# Patient Record
Sex: FEMALE | Race: BLACK OR AFRICAN AMERICAN | Employment: OTHER | URBAN - METROPOLITAN AREA
[De-identification: names, ages, dates, MRNs, and addresses within clinical notes are randomized per-mention and may not be internally consistent; named-entity substitution may affect disease eponyms.]

---

## 2017-01-22 RX ORDER — SIMVASTATIN 40 MG/1
TABLET, FILM COATED ORAL
Qty: 90 TAB | Refills: 0 | Status: SHIPPED | OUTPATIENT
Start: 2017-01-22 | End: 2017-05-24 | Stop reason: SDUPTHER

## 2017-02-03 ENCOUNTER — LAB ONLY (OUTPATIENT)
Dept: INTERNAL MEDICINE CLINIC | Age: 67
End: 2017-02-03

## 2017-02-03 DIAGNOSIS — R73.09 ELEVATED HEMOGLOBIN A1C: ICD-10-CM

## 2017-02-03 DIAGNOSIS — R73.01 IFG (IMPAIRED FASTING GLUCOSE): ICD-10-CM

## 2017-02-03 DIAGNOSIS — I10 ESSENTIAL HYPERTENSION WITH GOAL BLOOD PRESSURE LESS THAN 130/85: ICD-10-CM

## 2017-02-03 DIAGNOSIS — Z00.00 PHYSICAL EXAM, ANNUAL: ICD-10-CM

## 2017-02-04 LAB
BUN SERPL-MCNC: 13 MG/DL (ref 8–27)
BUN/CREAT SERPL: 15 (ref 11–26)
CALCIUM SERPL-MCNC: 10.2 MG/DL (ref 8.7–10.3)
CHLORIDE SERPL-SCNC: 101 MMOL/L (ref 96–106)
CO2 SERPL-SCNC: 22 MMOL/L (ref 18–29)
CREAT SERPL-MCNC: 0.89 MG/DL (ref 0.57–1)
ERYTHROCYTE [DISTWIDTH] IN BLOOD BY AUTOMATED COUNT: 13.7 % (ref 12.3–15.4)
GLUCOSE SERPL-MCNC: 89 MG/DL (ref 65–99)
HBA1C MFR BLD: 6.1 % (ref 4.8–5.6)
HCT VFR BLD AUTO: 34.7 % (ref 34–46.6)
HGB BLD-MCNC: 11.8 G/DL (ref 11.1–15.9)
MCH RBC QN AUTO: 30.5 PG (ref 26.6–33)
MCHC RBC AUTO-ENTMCNC: 34 G/DL (ref 31.5–35.7)
MCV RBC AUTO: 90 FL (ref 79–97)
PLATELET # BLD AUTO: 330 X10E3/UL (ref 150–379)
POTASSIUM SERPL-SCNC: 4.4 MMOL/L (ref 3.5–5.2)
RBC # BLD AUTO: 3.87 X10E6/UL (ref 3.77–5.28)
SODIUM SERPL-SCNC: 146 MMOL/L (ref 134–144)
TSH SERPL DL<=0.005 MIU/L-ACNC: 2.06 UIU/ML (ref 0.45–4.5)
WBC # BLD AUTO: 4.7 X10E3/UL (ref 3.4–10.8)

## 2017-02-06 ENCOUNTER — OFFICE VISIT (OUTPATIENT)
Dept: INTERNAL MEDICINE CLINIC | Age: 67
End: 2017-02-06

## 2017-02-06 VITALS
TEMPERATURE: 98.8 F | DIASTOLIC BLOOD PRESSURE: 69 MMHG | WEIGHT: 180.4 LBS | HEIGHT: 67 IN | HEART RATE: 66 BPM | OXYGEN SATURATION: 99 % | SYSTOLIC BLOOD PRESSURE: 121 MMHG | BODY MASS INDEX: 28.31 KG/M2

## 2017-02-06 DIAGNOSIS — M54.6 PAIN IN THORACIC SPINE: Primary | ICD-10-CM

## 2017-02-06 DIAGNOSIS — H40.9 GLAUCOMA OF BOTH EYES, UNSPECIFIED GLAUCOMA: ICD-10-CM

## 2017-02-06 DIAGNOSIS — R73.03 PREDIABETES: ICD-10-CM

## 2017-02-06 DIAGNOSIS — E78.00 PURE HYPERCHOLESTEROLEMIA: ICD-10-CM

## 2017-02-06 DIAGNOSIS — I10 ESSENTIAL HYPERTENSION WITH GOAL BLOOD PRESSURE LESS THAN 130/85: Primary | ICD-10-CM

## 2017-02-06 DIAGNOSIS — M85.80 OSTEOPENIA: ICD-10-CM

## 2017-02-06 DIAGNOSIS — K59.04 CHRONIC IDIOPATHIC CONSTIPATION: ICD-10-CM

## 2017-02-06 DIAGNOSIS — G89.29 CHRONIC LEFT-SIDED THORACIC BACK PAIN: ICD-10-CM

## 2017-02-06 DIAGNOSIS — Z23 ENCOUNTER FOR IMMUNIZATION: ICD-10-CM

## 2017-02-06 DIAGNOSIS — M54.6 CHRONIC LEFT-SIDED THORACIC BACK PAIN: ICD-10-CM

## 2017-02-06 RX ORDER — MELATONIN
DAILY
COMMUNITY
End: 2019-01-09

## 2017-02-06 NOTE — MR AVS SNAPSHOT
Visit Information Date & Time Provider Department Dept. Phone Encounter #  
 2/6/2017  1:30 PM Rhoderick Romberg, [address] [place] [address],[address] [phone] [id number] Follow-up Instructions Return in about 6 months (around 8/6/2017). Upcoming Health Maintenance Date Due ZOSTER VACCINE AGE 60> 11/25/2012 PAP AKA CERVICAL CYTOLOGY 4/21/2017 BREAST CANCER SCRN MAMMOGRAM 4/19/2018 COLONOSCOPY 12/1/2018 DTaP/Tdap/Td series (2 - Td) 1/1/2022 Allergies as of 2/6/2017  Review Complete On: 2/6/2017 By: Rhoderick Romberg, MD  
 No Known Allergies Current Immunizations  Reviewed on 2/6/2017 Name Date Tdap 1/1/2012 Reviewed by Rhoderick Romberg, MD on 2/6/2017 at  1:32 PM  
You Were Diagnosed With   
  
 Codes Comments Essential hypertension with goal blood pressure less than 130/85    -  Primary ICD-10-CM: I10 
ICD-9-CM: 401.9 Glaucoma of both eyes, unspecified glaucoma     ICD-10-CM: H40.9 ICD-9-CM: 365.9 Pure hypercholesterolemia     ICD-10-CM: E78.00 ICD-9-CM: 272.0 Prediabetes     ICD-10-CM: R73.03 
ICD-9-CM: 790.29 Chronic idiopathic constipation     ICD-10-CM: K59.04 
ICD-9-CM: 564.00 Osteopenia     ICD-10-CM: M85.80 ICD-9-CM: 733.90 Chronic left-sided thoracic back pain     ICD-10-CM: M54.6, G89.29 ICD-9-CM: 724.1, 338.29 Vitals BP Pulse Temp Height(growth percentile) Weight(growth percentile) SpO2  
 121/69 (BP 1 Location: Right arm, BP Patient Position: Sitting) 66 98.8 °F (37.1 °C) (Oral) 5' 7\" (1.702 m) 180 lb 6.4 oz (81.8 kg) 99% BMI OB Status Smoking Status 28.25 kg/m2 Hysterectomy Never Smoker BMI and BSA Data Body Mass Index Body Surface Area  
 28.25 kg/m 2 1.97 m 2 Preferred Pharmacy Pharmacy Name Phone [address], [facility] [phone] Your Updated Medication List  
  
   
 This list is accurate as of: 2/6/17  1:52 PM.  Always use your most recent med list.  
  
  
  
  
 CALTRATE 600 + D PO Take 1 Tab by mouth two (2) times a day. cholecalciferol 1,000 unit tablet Commonly known as:  VITAMIN D3 Take  by mouth daily. losartan-hydroCHLOROthiazide 100-25 mg per tablet Commonly known as:  HYZAAR Take 1 Tab by mouth daily. polyethylene glycol 17 gram packet Commonly known as:  Reda George Take 17 g by mouth daily. simvastatin 40 mg tablet Commonly known as:  ZOCOR  
TAKE 1 TABLET DAILY We Performed the Following REFERRAL FOR COLONOSCOPY [QTF889 Custom] Comments:  
 Please evaluate patient for change in BM  
 REFERRAL TO OPHTHALMOLOGY [REF57 Custom] Comments:  
 Please evaluate patient for h/o gluacoma Follow-up Instructions Return in about 6 months (around 8/6/2017). To-Do List   
 02/06/2017 Imaging:  XR SPINE THORAC 3 V   
  
 02/07/2017 Lab:  HEMOGLOBIN A1C WITH EAG   
  
 02/07/2017 Lab:  LIPID PANEL   
  
 02/07/2017 Lab:  METABOLIC PANEL, COMPREHENSIVE   
  
 02/07/2017 Lab:  TSH 3RD GENERATION   
  
 02/07/2017 Lab:  VITAMIN D, 25 HYDROXY Referral Information Referral ID Referred By Referred To  
  
 [id number] [facility] [address] [place] [address], [address] [place] [address] Phone: [phone] Fax: [phone] Visits Status Start Date End Date 1 New Request 2/6/17 2/6/18 If your referral has a status of pending review or denied, additional information will be sent to support the outcome of this decision. Referral ID Referred By Referred To  
 [id number] [facility] 202 [facility] 200 [facility] Visits Status Start Date End Date 1 New Request 2/6/17 2/6/18  If your referral has a status of pending review or denied, additional information will be sent to support the outcome of this decision. Introducing [facility]! Lew Castillo introduces [employer] patient portal. Now you can access parts of your medical record, email your doctor's office, and request medication refills online. 1. In your internet browser, go to [email or web address] 2. Click on the First Time User? Click Here link in the Sign In box. You will see the New Member Sign Up page. 3. Enter your [employer] Access Code exactly as it appears below. You will not need to use this code after youve completed the sign-up process. If you do not sign up before the expiration date, you must request a new code. · [employer] Access Code: [email or web address] Expires: 5/7/2017  1:01 PM 
 
4. Enter the last four digits of your Social Security Number (xxxx) and Date of Birth (mm/dd/yyyy) as indicated and click Submit. You will be taken to the next sign-up page. 5. Create a [employer] ID. This will be your [employer] login ID and cannot be changed, so think of one that is secure and easy to remember. 6. Create a [employer] password. You can change your password at any time. 7. Enter your Password Reset Question and Answer. This can be used at a later time if you forget your password. 8. Enter your e-mail address. You will receive e-mail notification when new information is available in [address] 9. Click Sign Up. You can now view and download portions of your medical record. 10. Click the Download Summary menu link to download a portable copy of your medical information. If you have questions, please visit the Frequently Asked Questions section of the [employer] website. Remember, [employer] is NOT to be used for urgent needs. For medical emergencies, dial 911. Now available from your iPhone and Android! Please provide this summary of care documentation to your next provider. Your primary care clinician is listed as Radha Lundberg.  If you have any questions after today's visit, please call [phone].

## 2017-02-06 NOTE — PROGRESS NOTES
HISTORY OF PRESENT ILLNESS  Lavern Walter is a 61 y.o. female. HPI   Last here 10/03/16. Pt is here to f/u on chronic conditions    BP today is 121/69  Continues losartan-HCTZ daily     Pt c/o back pain x 1-2 months, intermittently   Yesterday this was worsening and she had spasm to her L lower back with this  She took muscle relaxer at that time with improvement   Denies any fevers or chills with this  There is no radiation of her pain or weakness in her legs with this  Discussed XR back to r/o compression fracture   Can provide muscle relaxer or another medication-pt declines    Reviewed last labs 2/17  a1c improved     Pt states recently her BM has been more stringy and longer in nature, more thin than previously  This has been changing consistently recently from the past   She has been using miralax daily but the change has occurred before starting this  Will refer to early COLO, ordered for her, advised continuing miralax   Recall has seen Dr. Sveta Ramos in the past- will go back to see him     Wt is down 7 lbs since last visit  Pt has been walking more for exercise  Advised continuing diet and weight loss     Continues zocor 40mg daily for cholesterol     Recall she previously followed with Dr. Sveta Ramos about her stomach and episodes of diarrhea. Her stomach pain has resolved, continues on prevacid for this. Previously took amitiza prn but no longer on this, had hemorrhoid surgery.  Doing well     PREVENTIVE:  Colonoscopy: 12/01/11, Dr. Vernelle Sacks, repeat 7-10 years  Pap: Dr. Ramirez Cifuentes, 4/16  Mammogram: 4/16 negative  Dexa: 8/19/16 osteopenia  Tdap: 2012  Pneumovax: out of this, will have at f/u from 10/16  Harleen Amel: not yet needed  Zostavax: holding off d/t cost  Flu shot: declines    A1c: 10/16 6.4, 2/17 6.1  Eye exam: 2 years ago, due, will schedule, referred to Dr. Rosendo PAUL screen: 2/16 negative  Lipids: 8/16 LDL 76          Patient Active Problem List    Diagnosis Date Noted    Essential hypertension with goal blood pressure less than 130/85 08/03/2016    Pure hypercholesterolemia 08/03/2016    Osteopenia 03/23/2012    Constipation 08/05/2011    Glaucoma 08/05/2011    Vertigo 08/05/2011    Headache(784.0) 06/27/2011    GERD - Esophagitis - reflux 03/22/2011     Current Outpatient Prescriptions   Medication Sig Dispense Refill    simvastatin (ZOCOR) 40 mg tablet TAKE 1 TABLET DAILY 90 Tab 0    estradiol (ESTRACE) 0.5 mg tablet Take  by mouth daily.  losartan-hydrochlorothiazide (HYZAAR) 100-25 mg per tablet Take 1 Tab by mouth daily. 30 Tab 5    lansoprazole (PREVACID) 30 mg capsule TAKE 1 CAPSULE DAILY BEFORE BREAKFAST 90 Cap 1    CALCIUM CARBONATE/VITAMIN D3 (CALTRATE 600 + D PO) Take 1 Tab by mouth two (2) times a day.  polyethylene glycol (MIRALAX) 17 gram packet Take 17 g by mouth daily. Past Surgical History   Procedure Laterality Date    Pr breast surgery procedure unlisted      Hx other surgical       \"hemrrhoid surgery\"      Lab Results  Component Value Date/Time   WBC 4.7 02/03/2017 11:54 AM   HGB 11.8 02/03/2017 11:54 AM   HCT 34.7 02/03/2017 11:54 AM   PLATELET 997 19/83/5498 11:54 AM   MCV 90 02/03/2017 11:54 AM       Lab Results  Component Value Date/Time   Cholesterol, total 156 08/03/2016 03:24 PM   HDL Cholesterol 68 08/03/2016 03:24 PM   LDL, calculated 76 08/03/2016 03:24 PM   Triglyceride 58 08/03/2016 03:24 PM       Lab Results  Component Value Date/Time   GFR est AA 79 02/03/2017 11:54 AM   GFR est non-AA 69 02/03/2017 11:54 AM   Creatinine 0.89 02/03/2017 11:54 AM   BUN 13 02/03/2017 11:54 AM   Sodium 146 02/03/2017 11:54 AM   Potassium 4.4 02/03/2017 11:54 AM   Chloride 101 02/03/2017 11:54 AM   CO2 22 02/03/2017 11:54 AM         Review of Systems   Respiratory: Negative for shortness of breath. Cardiovascular: Negative for chest pain. Physical Exam   Constitutional: She is oriented to person, place, and time. She appears well-developed and well-nourished. No distress. HENT:   Head: Normocephalic and atraumatic. Mouth/Throat: Oropharynx is clear and moist. No oropharyngeal exudate. Eyes: Conjunctivae and EOM are normal. Right eye exhibits no discharge. Left eye exhibits no discharge. Neck: Normal range of motion. Neck supple. Cardiovascular: Normal rate, regular rhythm and normal heart sounds. Exam reveals no gallop and no friction rub. No murmur heard. Pulmonary/Chest: Effort normal and breath sounds normal. No respiratory distress. She has no wheezes. She has no rales. She exhibits no tenderness. Musculoskeletal: Normal range of motion. She exhibits no edema, tenderness or deformity. Lymphadenopathy:     She has no cervical adenopathy. Neurological: She is alert and oriented to person, place, and time. Coordination normal.   Skin: Skin is warm and dry. No rash noted. She is not diaphoretic. No erythema. No pallor. Psychiatric: She has a normal mood and affect. Her behavior is normal.       ASSESSMENT and PLAN    ICD-10-CM ICD-9-CM    1. Essential hypertension with goal blood pressure less than 130/85    Controlled on increased dose losartan-HCTZ 100-25mg daily, continue no change to dose. I10 401.9 HEMOGLOBIN A1C WITH EAG      METABOLIC PANEL, COMPREHENSIVE      LIPID PANEL      TSH 3RD GENERATION      VITAMIN D, 25 HYDROXY   2. Glaucoma of both eyes, unspecified glaucoma    Still due for eye exam. Has not completed in several years and has h/o glaucoma, discussed importance of scheduling eye exam, she is aware and will schedule this.  H40.9 365.9 REFERRAL TO OPHTHALMOLOGY      HEMOGLOBIN A1C WITH EAG      METABOLIC PANEL, COMPREHENSIVE      LIPID PANEL      TSH 3RD GENERATION      VITAMIN D, 25 HYDROXY   3. Pure hypercholesterolemia    On zocor, at goal. E78.00 272.0 HEMOGLOBIN A1C WITH EAG      METABOLIC PANEL, COMPREHENSIVE      LIPID PANEL      TSH 3RD GENERATION      VITAMIN D, 25 HYDROXY   4. Prediabetes    Diet controlled, wt is stable, continue to work on weight loss. R73.03 790.29 HEMOGLOBIN A1C WITH EAG      METABOLIC PANEL, COMPREHENSIVE      LIPID PANEL      TSH 3RD GENERATION      VITAMIN D, 25 HYDROXY   5. Chronic idiopathic constipation    Pt was evaluated by Dr. Nick Deras (GI) in the past, had been on amitiza in the past and had been on prevacid as well, has h/o hemorrhiods with surgery, and has h/o chronic constipation, she reports recent worsening and change in her BM. Will go ahead and have her schedule her colonoscopy now rather than waiting one more year. K59.04 564.00 REFERRAL FOR COLONOSCOPY      HEMOGLOBIN A1C WITH EAG      METABOLIC PANEL, COMPREHENSIVE      LIPID PANEL      TSH 3RD GENERATION      VITAMIN D, 25 HYDROXY   6. Osteopenia    DEXA showed stable bone density in August. Continue vit D M85.80 733.90 HEMOGLOBIN A1C WITH EAG      METABOLIC PANEL, COMPREHENSIVE      LIPID PANEL      TSH 3RD GENERATION      VITAMIN D, 25 HYDROXY   7. Chronic left-sided thoracic back pain    Possible thoracic radiculopathy, will get plain film T spine to r/o compression fractures, discussed treatment such as gabapentin, for now she would like to just do stretching exercises and will contact office if not improving. M54.6 724.1 XR SPINE THORAC 3 V    G89.29 338.29 HEMOGLOBIN A1C WITH EAG      METABOLIC PANEL, COMPREHENSIVE      LIPID PANEL      TSH 3RD GENERATION      VITAMIN D, 25 HYDROXY          Written by Susan Jaramillo, as dictated by Rosa Padilla MD.    Current diagnosis and concerns discussed with pt at length. Understands risks and benefits or current treatment plan and medications and accepts the treatment and medication with any possible risks.   Pt asks appropriate questions which were answered.   Pt instructed to call with any concerns or problems.

## 2017-02-06 NOTE — PROGRESS NOTES
CHARLIE per Dr. Lauren Lee, pneumonia vaccination given intramuscularly into L Deltoid muscle at 0.5ml after obtaining the consent form. Administered by Devang Taylor LPN. VIS given. 2/6/17.

## 2017-04-20 RX ORDER — LOSARTAN POTASSIUM AND HYDROCHLOROTHIAZIDE 25; 100 MG/1; MG/1
1 TABLET ORAL DAILY
Qty: 30 TAB | Refills: 5 | Status: SHIPPED | OUTPATIENT
Start: 2017-04-20 | End: 2017-12-13 | Stop reason: SDUPTHER

## 2017-05-24 RX ORDER — SIMVASTATIN 40 MG/1
TABLET, FILM COATED ORAL
Qty: 90 TAB | Refills: 1 | Status: SHIPPED | OUTPATIENT
Start: 2017-05-24 | End: 2018-01-31 | Stop reason: SDUPTHER

## 2017-05-25 ENCOUNTER — HOSPITAL ENCOUNTER (OUTPATIENT)
Dept: MAMMOGRAPHY | Age: 67
Discharge: HOME | End: 2017-05-25
Attending: INTERNAL MEDICINE
Payer: COMMERCIAL

## 2017-05-25 DIAGNOSIS — Z12.31 VISIT FOR SCREENING MAMMOGRAM: ICD-10-CM

## 2017-05-25 PROCEDURE — 77067 SCR MAMMO BI INCL CAD: CPT

## 2017-08-15 ENCOUNTER — OFFICE VISIT (OUTPATIENT)
Dept: INTERNAL MEDICINE CLINIC | Age: 67
End: 2017-08-15

## 2017-08-15 VITALS
RESPIRATION RATE: 17 BRPM | HEIGHT: 67 IN | BODY MASS INDEX: 28.09 KG/M2 | DIASTOLIC BLOOD PRESSURE: 78 MMHG | HEART RATE: 71 BPM | TEMPERATURE: 97.7 F | WEIGHT: 179 LBS | SYSTOLIC BLOOD PRESSURE: 121 MMHG | OXYGEN SATURATION: 100 %

## 2017-08-15 DIAGNOSIS — K20.90 ESOPHAGITIS, UNSPECIFIED: ICD-10-CM

## 2017-08-15 DIAGNOSIS — M85.80 OSTEOPENIA: ICD-10-CM

## 2017-08-15 DIAGNOSIS — E78.00 PURE HYPERCHOLESTEROLEMIA: ICD-10-CM

## 2017-08-15 DIAGNOSIS — R73.01 IFG (IMPAIRED FASTING GLUCOSE): ICD-10-CM

## 2017-08-15 DIAGNOSIS — R73.03 PREDIABETES: ICD-10-CM

## 2017-08-15 DIAGNOSIS — G89.29 CHRONIC LEFT-SIDED THORACIC BACK PAIN: ICD-10-CM

## 2017-08-15 DIAGNOSIS — M54.6 CHRONIC LEFT-SIDED THORACIC BACK PAIN: ICD-10-CM

## 2017-08-15 DIAGNOSIS — H40.9 GLAUCOMA OF BOTH EYES, UNSPECIFIED GLAUCOMA: ICD-10-CM

## 2017-08-15 DIAGNOSIS — I10 ESSENTIAL HYPERTENSION WITH GOAL BLOOD PRESSURE LESS THAN 130/85: Primary | ICD-10-CM

## 2017-08-15 DIAGNOSIS — K59.04 CHRONIC IDIOPATHIC CONSTIPATION: ICD-10-CM

## 2017-08-15 RX ORDER — IBUPROFEN 200 MG
TABLET ORAL AS NEEDED
COMMUNITY
End: 2017-12-12

## 2017-08-15 RX ORDER — LATANOPROST 50 UG/ML
1 SOLUTION/ DROPS OPHTHALMIC
Refills: 0 | COMMUNITY
Start: 2017-06-21 | End: 2020-02-12

## 2017-08-15 RX ORDER — SIMETHICONE 180 MG
CAPSULE ORAL AS NEEDED
Status: ON HOLD | COMMUNITY
End: 2018-02-28

## 2017-08-15 RX ORDER — LANSOPRAZOLE 30 MG/1
CAPSULE, DELAYED RELEASE ORAL
COMMUNITY
End: 2017-11-03 | Stop reason: SDUPTHER

## 2017-08-15 RX ORDER — RANITIDINE 150 MG/1
150 TABLET, FILM COATED ORAL
COMMUNITY
End: 2020-02-12

## 2017-08-15 NOTE — PROGRESS NOTES
HISTORY OF PRESENT ILLNESS  Daryn Copeland is a 61 y.o. female. HPI   Last here 2/06/17. Pt is here to f/u on chronic conditions    BP today is great- 121/78  BP at home running around   Continues losartan-HCTZ daily   She has not taken her medications yet-typically takes this midday     Reviewed last labs 2/17  a1c improved 6.1  Repeat labs today     Last visit, pt had been having some back pain  Reviewed XR back 2/17: mild arthritic changes  Sx are resolved at this time    Wt is stable since last visit   She is working on improving her diet   She is drinking only water with some calorie free additives  Discussed diet and weight loss     Pt has appointment pending with Dr. Vane Riley (GI) next week in 8/17   She has been having worsening acid reflux x 3 months   She still takes prevacid daily for the reflux and has been using zantac BID  This regimen is not improving her sx however   Pt has bloating and acid breakthrough frequently   Pt has been getting more constipated as well   She has been taking miralax BID to have normal BM   Will defer changes in reflux medications to Dr. Vane Riley (GI)   Recall she previously followed with Dr. Vane Riley about her stomach and episodes of diarrhea. Her stomach pain has resolved, continues on prevacid for this. Previously took amitiza prn but no longer on this, had hemorrhoid surgery. Doing well     Continues zocor 40mg daily for cholesterol   Due for lipids check     Reviewed mammogram 5/25/17: negative    Pt states her niece was killed last week   She has been under increased stress since this  Discussed increased stress can contribute to worsening GI sx       PREVENTIVE:  Colonoscopy: 12/01/11, Dr. Genie Lacey, repeat 7-10 years.  Scheduling repeat with Dr. Vane Riley   Pap: Dr. Eliz Wilhelm, 4/17  Mammogram: 5/25/17 negative  DEXA: 8/19/16 osteopenia  Tdap: 2012  Pneumovax: 2/06/2017  [id number]: not yet needed  Zostavax: holding off d/t cost  Flu shot: declines    A1c: 10/16 6.4, 2/17 6.1  Eye exam: Dr. Razia Swain, 4/17  Hep C screen: 2/16 negative  Lipids: 8/16 LDL 76        Patient Active Problem List    Diagnosis Date Noted    Essential hypertension with goal blood pressure less than 130/85 08/03/2016    Pure hypercholesterolemia 08/03/2016    Osteopenia 03/23/2012    Constipation 08/05/2011    Glaucoma 08/05/2011    Vertigo 08/05/2011    Headache 06/27/2011    GERD - Esophagitis - reflux 03/22/2011     Current Outpatient Prescriptions   Medication Sig Dispense Refill    latanoprost (XALATAN) 0.005 % ophthalmic solution   0    lansoprazole (PREVACID) 30 mg capsule Take  by mouth Daily (before breakfast).  ibuprofen (ADVIL) 200 mg tablet Take  by mouth.  raNITIdine (ZANTAC) 150 mg tablet Take 150 mg by mouth two (2) times a day.  simethicone 180 mg cap Take  by mouth.  simvastatin (ZOCOR) 40 mg tablet TAKE 1 TABLET DAILY 90 Tab 1    losartan-hydroCHLOROthiazide (HYZAAR) 100-25 mg per tablet Take 1 Tab by mouth daily. 30 Tab 5    cholecalciferol (VITAMIN D3) 1,000 unit tablet Take  by mouth daily.  CALCIUM CARBONATE/VITAMIN D3 (CALTRATE 600 + D PO) Take 1 Tab by mouth two (2) times a day.  polyethylene glycol (MIRALAX) 17 gram packet Take 17 g by mouth daily.        Past Surgical History:   Procedure Laterality Date    BREAST SURGERY PROCEDURE UNLISTED      HX BREAST BIOPSY Left 1980s    benign cyst    HX HYSTERECTOMY      HX OTHER SURGICAL      \"hemrrhoid surgery\"      Lab Results  Component Value Date/Time   WBC 4.7 02/03/2017 11:54 AM   HGB 11.8 02/03/2017 11:54 AM   HCT 34.7 02/03/2017 11:54 AM   PLATELET 486 25/21/2832 11:54 AM   MCV 90 02/03/2017 11:54 AM     Lab Results  Component Value Date/Time   Cholesterol, total 156 08/03/2016 03:24 PM   HDL Cholesterol 68 08/03/2016 03:24 PM   LDL, calculated 76 08/03/2016 03:24 PM   Triglyceride 58 08/03/2016 03:24 PM     Lab Results  Component Value Date/Time   GFR est non-AA 69 02/03/2017 11:54 AM   GFR est AA 79 02/03/2017 11:54 AM   Creatinine 0.89 02/03/2017 11:54 AM   BUN 13 02/03/2017 11:54 AM   Sodium 146 02/03/2017 11:54 AM   Potassium 4.4 02/03/2017 11:54 AM   Chloride 101 02/03/2017 11:54 AM   CO2 22 02/03/2017 11:54 AM   Magnesium 1.9 09/03/2014 10:55 AM          Review of Systems   Respiratory: Negative for shortness of breath. Cardiovascular: Negative for chest pain. Physical Exam   Constitutional: She is oriented to person, place, and time. She appears well-developed and well-nourished. No distress. HENT:   Head: Normocephalic and atraumatic. Eyes: Conjunctivae and EOM are normal. Right eye exhibits no discharge. Left eye exhibits no discharge. Neck: Normal range of motion. Neck supple. Cardiovascular: Normal rate, regular rhythm, normal heart sounds and intact distal pulses. Exam reveals no gallop and no friction rub. No murmur heard. Pulmonary/Chest: Effort normal and breath sounds normal. No respiratory distress. She has no wheezes. She has no rales. She exhibits no tenderness. Musculoskeletal: Normal range of motion. She exhibits no edema, tenderness or deformity. Lymphadenopathy:     She has no cervical adenopathy. Neurological: She is alert and oriented to person, place, and time. Coordination normal.   Skin: Skin is warm and dry. No rash noted. She is not diaphoretic. No erythema. No pallor. Psychiatric: She has a normal mood and affect. Her behavior is normal.       ASSESSMENT and PLAN    ICD-10-CM ICD-9-CM    1. Essential hypertension with goal blood pressure less than 130/85    BP controlled on losartan-HCTZ, continue, no change to dose. I10 401.9 HEMOGLOBIN A1C WITH EAG      METABOLIC PANEL, COMPREHENSIVE      LIPID PANEL      TSH 3RD GENERATION      VITAMIN D, 25 HYDROXY   2. Pure hypercholesterolemia    Controlled on zocor 40mg daily.  Continue  E78.00 272.0 HEMOGLOBIN A1C WITH EAG      METABOLIC PANEL, COMPREHENSIVE      LIPID PANEL      TSH 3RD GENERATION      VITAMIN D, 25 HYDROXY   3. IFG (impaired fasting glucose)    Repeat a1c, wt stable, continue to work on diet and weight loss   R73.01 790.21    4. GERD - Esophagitis - reflux    Having progressive sx, she has a lot of social stressors lately that are likely contributing to her sx. I addressed this with her. She will be seeing Dr. Chico Mcgrath next week. For now, continue prevacid and zantac prn. Discussed alternate tx options to include nexium and protonix and perhaps TCA given IBS. She will first await and see what Dr. Chico Mcgrath has to say. K20.9 530.10    5. Chronic idiopathic constipation    Previously on amitiza, for now using miralax BID which is improving her sx K59.04 564.00 HEMOGLOBIN A1C WITH EAG      METABOLIC PANEL, COMPREHENSIVE      LIPID PANEL      TSH 3RD GENERATION      VITAMIN D, 25 HYDROXY   6. Glaucoma of both eyes, unspecified glaucoma    Follows closely with ophtho H40.9 365.9 HEMOGLOBIN A1C WITH EAG      METABOLIC PANEL, COMPREHENSIVE      LIPID PANEL      TSH 3RD GENERATION      VITAMIN D, 25 HYDROXY   7. Prediabetes    See above R73.03 790.29 HEMOGLOBIN A1C WITH EAG      METABOLIC PANEL, COMPREHENSIVE      LIPID PANEL      TSH 3RD GENERATION      VITAMIN D, 25 HYDROXY   8. Osteopenia    Repeat DEXA in 2018. Vit D for tx M85.80 733.90 HEMOGLOBIN A1C WITH EAG      METABOLIC PANEL, COMPREHENSIVE      LIPID PANEL      TSH 3RD GENERATION      VITAMIN D, 25 HYDROXY   9. Chronic left-sided thoracic back pain    Resolved. M54.6 724.1 HEMOGLOBIN A1C WITH EAG    L87.19 269.67 METABOLIC PANEL, COMPREHENSIVE      LIPID PANEL      TSH 3RD GENERATION      VITAMIN D, 25 HYDROXY            Written by John David, as dictated by Katya Campbell MD.    Current diagnosis and concerns discussed with pt at length.  Understands risks and benefits or current treatment plan and medications and accepts the treatment and medication with any possible risks.   Pt asks appropriate questions which were answered.   Pt instructed to call with any concerns or problems. This note will not be viewable in [address]

## 2017-08-15 NOTE — MR AVS SNAPSHOT
Visit Information Date & Time Provider Department Dept. Phone Encounter #  
 8/15/2017  1:30 PM Liliya Stoddard, [address] [phone] [id number] Follow-up Instructions Return in about 6 months (around 2/15/2018). Your Appointments 8/15/2017  1:30 PM  
ROUTINE CARE with Liliya Stoddard, [address] [facility]) Appt Note: 6 mo f/u  
 [address] [address]  
[address] [facility] [address] Upcoming Health Maintenance Date Due COLONOSCOPY 12/1/2018 BREAST CANCER SCRN MAMMOGRAM 5/25/2019 PAP AKA CERVICAL CYTOLOGY 4/10/2020 DTaP/Tdap/Td series (2 - Td) 1/1/2022 Allergies as of 8/15/2017  Review Complete On: 8/15/2017 By: Manny Perdomo LPN No Known Allergies Current Immunizations  Reviewed on 8/15/2017 Name Date Pneumococcal Polysaccharide (PPSV-23) 2/6/2017 Tdap 1/1/2012 Reviewed by Liliya Stoddard MD on 8/15/2017 at  1:20 PM  
 Reviewed by Liliya Stoddard MD on 8/15/2017 at  1:20 PM  
You Were Diagnosed With   
  
 Codes Comments Essential hypertension with goal blood pressure less than 130/85    -  Primary ICD-10-CM: I10 
ICD-9-CM: 401.9 Pure hypercholesterolemia     ICD-10-CM: E78.00 ICD-9-CM: 272.0 IFG (impaired fasting glucose)     ICD-10-CM: R73.01 
ICD-9-CM: 790.21 Esophagitis, unspecified     ICD-10-CM: K20.9 ICD-9-CM: 530.10 Chronic idiopathic constipation     ICD-10-CM: K59.04 
ICD-9-CM: 564.00 Vitals BP Pulse Temp Resp Height(growth percentile) Weight(growth percentile) 121/78 (BP 1 Location: Left arm, BP Patient Position: Sitting) 71 97.7 °F (36.5 °C) (Oral) 17 5' 7\" (1.702 m) 179 lb (81.2 kg) SpO2 BMI OB Status Smoking Status 100% 28.04 kg/m2 Hysterectomy Never Smoker Vitals History BMI and BSA Data Body Mass Index Body Surface Area 28.04 kg/m 2 1.96 m 2 Preferred Pharmacy Pharmacy Name Phone Lisette Ramírez [facility] [phone] Your Updated Medication List  
  
   
This list is accurate as of: 8/15/17  1:26 PM.  Always use your most recent med list. ADVIL 200 mg tablet Generic drug:  ibuprofen Take  by mouth. CALTRATE 600 + D PO Take 1 Tab by mouth two (2) times a day. cholecalciferol 1,000 unit tablet Commonly known as:  VITAMIN D3 Take  by mouth daily. lansoprazole 30 mg capsule Commonly known as:  PREVACID Take  by mouth Daily (before breakfast). latanoprost 0.005 % ophthalmic solution Commonly known as:  XALATAN  
  
 losartan-hydroCHLOROthiazide 100-25 mg per tablet Commonly known as:  HYZAAR Take 1 Tab by mouth daily. polyethylene glycol 17 gram packet Commonly known as:  Servando Aloe Take 17 g by mouth daily. simethicone 180 mg Cap Take  by mouth. simvastatin 40 mg tablet Commonly known as:  ZOCOR  
TAKE 1 TABLET DAILY  
  
 ZANTAC 150 mg tablet Generic drug:  raNITIdine Take 150 mg by mouth two (2) times a day. Follow-up Instructions Return in about 6 months (around 2/15/2018). Introducing [facility]! [facility] introduces [employer] patient portal. Now you can access parts of your medical record, email your doctor's office, and request medication refills online. 1. In your internet browser, go to [email or web address] ([email or web address])t 2. Click on the First Time User? Click Here link in the Sign In box. You will see the New Member Sign Up page. 3. Enter your Fer[employer] Access Code exactly as it appears below. You will not need to use this code after youve completed the sign-up process. If you do not sign up before the expiration date, you must request a new code. · [employer] Access Code: [email or web address] Expires: 11/13/2017  1:26 PM 
 
4. Enter the last four digits of your Social Security Number (xxxx) and Date of Birth (mm/dd/yyyy) as indicated and click Submit. You will be taken to the next sign-up page. 5. Create a [employer] ID. This will be your PayNearMe login ID and cannot be changed, so think of one that is secure and easy to remember. 6. Create a PayNearMe password. You can change your password at any time. 7. Enter your Password Reset Question and Answer. This can be used at a later time if you forget your password. 8. Enter your e-mail address. You will receive e-mail notification when new information is available in [address] 9. Click Sign Up. You can now view and download portions of your medical record. 10. Click the Download Summary menu link to download a portable copy of your medical information. If you have questions, please visit the Frequently Asked Questions section of the PayNearMe website. Remember, PayNearMe is NOT to be used for urgent needs. For medical emergencies, dial 911. Now available from your iPhone and Android! Please provide this summary of care documentation to your next provider. Your primary care clinician is listed as Tom Solitario. If you have any questions after today's visit, please call [phone].

## 2017-08-16 LAB
25(OH)D3+25(OH)D2 SERPL-MCNC: 27.3 NG/ML (ref 30–100)
ALBUMIN SERPL-MCNC: 4.9 G/DL (ref 3.6–4.8)
ALBUMIN/GLOB SERPL: 1.8 {RATIO} (ref 1.2–2.2)
ALP SERPL-CCNC: 66 IU/L (ref 39–117)
ALT SERPL-CCNC: 12 IU/L (ref 0–32)
AST SERPL-CCNC: 16 IU/L (ref 0–40)
BILIRUB SERPL-MCNC: 0.5 MG/DL (ref 0–1.2)
BUN SERPL-MCNC: 16 MG/DL (ref 8–27)
BUN/CREAT SERPL: 16 (ref 12–28)
CALCIUM SERPL-MCNC: 10.3 MG/DL (ref 8.7–10.3)
CHLORIDE SERPL-SCNC: 99 MMOL/L (ref 96–106)
CHOLEST SERPL-MCNC: 174 MG/DL (ref 100–199)
CO2 SERPL-SCNC: 25 MMOL/L (ref 18–29)
CREAT SERPL-MCNC: 0.99 MG/DL (ref 0.57–1)
EST. AVERAGE GLUCOSE BLD GHB EST-MCNC: 126 MG/DL
GLOBULIN SER CALC-MCNC: 2.7 G/DL (ref 1.5–4.5)
GLUCOSE SERPL-MCNC: 86 MG/DL (ref 65–99)
HBA1C MFR BLD: 6 % (ref 4.8–5.6)
HDLC SERPL-MCNC: 69 MG/DL
LDLC SERPL CALC-MCNC: 90 MG/DL (ref 0–99)
POTASSIUM SERPL-SCNC: 5 MMOL/L (ref 3.5–5.2)
PROT SERPL-MCNC: 7.6 G/DL (ref 6–8.5)
SODIUM SERPL-SCNC: 143 MMOL/L (ref 134–144)
TRIGL SERPL-MCNC: 77 MG/DL (ref 0–149)
TSH SERPL DL<=0.005 MIU/L-ACNC: 2.35 UIU/ML (ref 0.45–4.5)
VLDLC SERPL CALC-MCNC: 15 MG/DL (ref 5–40)

## 2017-08-31 RX ORDER — LANSOPRAZOLE 30 MG/1
CAPSULE, DELAYED RELEASE ORAL
Qty: 90 CAP | Refills: 1 | Status: SHIPPED | OUTPATIENT
Start: 2017-08-31 | End: 2018-01-31 | Stop reason: SDUPTHER

## 2017-09-25 ENCOUNTER — TELEPHONE (OUTPATIENT)
Dept: INTERNAL MEDICINE CLINIC | Age: 67
End: 2017-09-25

## 2017-09-25 NOTE — TELEPHONE ENCOUNTER
#[phone] pt states Dr. Henrik Guillen wants pt to see a cardiologist.  Please refer pt to one. Thanks.

## 2017-09-25 NOTE — TELEPHONE ENCOUNTER
Called, spoke to pt. Two pt identifiers confirmed. Pt states that Dr. Alyssa Campos would like her to see a Card. Pt states that given her reflux, GI would like to r/o anything Card prior to getting colo/egd in Nov.  Pt given Dr Jeronimo Knight number. Pt verbalized understanding of information discussed w/ no further questions at this time.

## 2017-11-03 ENCOUNTER — OFFICE VISIT (OUTPATIENT)
Dept: CARDIOLOGY CLINIC | Age: 67
End: 2017-11-03

## 2017-11-03 VITALS
SYSTOLIC BLOOD PRESSURE: 130 MMHG | WEIGHT: 181.9 LBS | RESPIRATION RATE: 16 BRPM | DIASTOLIC BLOOD PRESSURE: 80 MMHG | BODY MASS INDEX: 28.55 KG/M2 | OXYGEN SATURATION: 98 % | HEART RATE: 70 BPM | HEIGHT: 67 IN

## 2017-11-03 DIAGNOSIS — R06.09 DOE (DYSPNEA ON EXERTION): Primary | ICD-10-CM

## 2017-11-03 DIAGNOSIS — I10 ESSENTIAL HYPERTENSION WITH GOAL BLOOD PRESSURE LESS THAN 130/85: ICD-10-CM

## 2017-11-03 DIAGNOSIS — E78.00 PURE HYPERCHOLESTEROLEMIA: ICD-10-CM

## 2017-11-03 RX ORDER — DIMENHYDRINATE 50 MG
50 TABLET ORAL
COMMUNITY
End: 2017-11-27

## 2017-11-03 RX ORDER — ESTRADIOL 0.5 MG/1
TABLET ORAL DAILY
COMMUNITY
End: 2018-07-09

## 2017-11-03 NOTE — PROGRESS NOTES
Domonique Escobedo DNP, ANP-BC  Subjective/HPI:     Arcelia Edouard is a 61 y.o. female is here for new patient consultation at the request of gastroenterology Dr. Mono Pastor. Patient is pending endoscopy and colonoscopy for episodes of anterior chest burning and discomfort as well as dyspnea on exertion with one flight of stairs. She has a history of hypertension, hyperlipidemia, nondiabetic, no family history of heart disease she is a non-smoker. PCP Provider  Wenceslao Mckeon MD  Past Medical History:   Diagnosis Date    GERD - Esophagitis - reflux 3/22/2011    Headache(784.0) 6/27/2011    Hypercholesterolemia 3/22/2011    Hypertension 3/22/2011    Other ill-defined conditions(799.89)     high cholesterol, vertigo, migraine    Ulcer ([address]       Past Surgical History:   Procedure Laterality Date    BREAST SURGERY PROCEDURE UNLISTED      HX BREAST BIOPSY Left 1980s    benign cyst    HX HYSTERECTOMY      HX OTHER SURGICAL      \"hemrrhoid surgery\"     No Known Allergies   Family History   Problem Relation Age of Onset    Hypertension Mother     Hypertension Father       Current Outpatient Prescriptions   Medication Sig    estradiol (ESTRACE) 0.5 mg tablet Take  by mouth daily.  dimenhyDRINATE (DRAMAMINE) 50 mg tablet Take 50 mg by mouth nightly as needed.  lansoprazole (PREVACID) 30 mg capsule TAKE 1 CAPSULE DAILY BEFORE BREAKFAST    latanoprost (XALATAN) 0.005 % ophthalmic solution Administer 1 Drop to both eyes nightly.  ibuprofen (ADVIL) 200 mg tablet Take  by mouth as needed.  raNITIdine (ZANTAC) 150 mg tablet Take 150 mg by mouth two (2) times a day.  simvastatin (ZOCOR) 40 mg tablet TAKE 1 TABLET DAILY    losartan-hydroCHLOROthiazide (HYZAAR) 100-25 mg per tablet Take 1 Tab by mouth daily.  cholecalciferol (VITAMIN D3) 1,000 unit tablet Take  by mouth daily.  polyethylene glycol (MIRALAX) 17 gram packet Take 17 g by mouth daily.  simethicone 180 mg cap Take  by mouth.     CALCIUM CARBONATE/VITAMIN D3 (CALTRATE 600 + D PO) Take 1 Tab by mouth two (2) times a day. No current facility-administered medications for this visit. Vitals:    11/03/17 1526   BP: 130/80   Pulse: 70   Resp: 16   SpO2: 98%   Weight: 181 lb 14.4 oz (82.5 kg)   Height: 5' 7\" (1.702 m)     Social History     Social History    Marital status:      Spouse name: N/A    Number of children: N/A    Years of education: N/A     Occupational History    Not on file. Social History Main Topics    Smoking status: Never Smoker    Smokeless tobacco: Never Used    Alcohol use No    Drug use: No    Sexual activity: Not on file     Other Topics Concern    Not on file     Social History Narrative     Family History   Problem Relation Age of Onset    Hypertension Mother     Hypertension Father        I have reviewed the nurses notes, vitals, problem list, allergy list, medical history, family, social history and medications. Review of Symptoms:    General: Pt denies excessive weight gain or loss. Pt is able to conduct ADL's  HEENT: Denies blurred vision, headaches, epistaxis and difficulty swallowing. Respiratory: Denies shortness of breath, + COOPER, wheezing or stridor. Cardiovascular: Denies precordial pain, palpitations, edema or PND  Gastrointestinal: Denies poor appetite, + indigestion denies, abdominal pain or blood in stool  Musculoskeletal: Denies pain or swelling from muscles or joints  Neurologic: Denies tremor, paresthesias, or sensory motor disturbance  Skin: Denies rash, itching or texture change. Physical Exam:      General: Well developed, in no acute distress, cooperative and alert  HEENT: No carotid bruits, no JVD, trach is midline. Neck Supple, PEERL, EOM intact. Heart:  Normal S1/S2 negative S3 or S4.  Regular, no murmur, gallop or rub.   Respiratory: Clear bilaterally x 4, no wheezing or rales  Abdomen:   Soft, non-tender, no masses, bowel sounds are active.   Extremities: No edema, normal cap refill, no cyanosis, atraumatic. Neuro: A&Ox3, speech clear, gait stable. Skin: Skin color is normal. No rashes or lesions. Non diaphoretic  Vascular: 2+ pulses symmetric in all extremities    Cardiographics    ECG: Normal sinus rhythm  Results for orders placed or performed during the hospital encounter of 09/03/14   EKG, 12 LEAD, INITIAL   Result Value Ref Range    Ventricular Rate 62 BPM    Atrial Rate 62 BPM    P-R Interval 214 ms    QRS Duration 80 ms    Q-T Interval 412 ms    QTC Calculation (Bezet) 418 ms    Calculated P Axis 67 degrees    Calculated R Axis 3 degrees    Calculated T Axis 45 degrees    Diagnosis       Sinus rhythm with 1st degree AV block  When compared with ECG of 15-RUPERTO-2010 16:43,  WI interval has increased  Confirmed by Royce Napier ([id number]) on 9/3/2014 6:16:44 PM         Cardiology Labs:  Lab Results   Component Value Date/Time    Cholesterol, total 174 08/15/2017 01:45 PM    HDL Cholesterol 69 08/15/2017 01:45 PM    LDL, calculated 90 08/15/2017 01:45 PM    Triglyceride 77 08/15/2017 01:45 PM       Lab Results   Component Value Date/Time    Sodium 143 08/15/2017 01:45 PM    Potassium 5.0 08/15/2017 01:45 PM    Chloride 99 08/15/2017 01:45 PM    CO2 25 08/15/2017 01:45 PM    Anion gap 5 09/03/2014 10:55 AM    Glucose 86 08/15/2017 01:45 PM    BUN 16 08/15/2017 01:45 PM    Creatinine 0.99 08/15/2017 01:45 PM    BUN/Creatinine ratio 16 08/15/2017 01:45 PM    GFR est AA 70 08/15/2017 01:45 PM    GFR est non-AA 60 08/15/2017 01:45 PM    Calcium 10.3 08/15/2017 01:45 PM    Bilirubin, total 0.5 08/15/2017 01:45 PM    AST (SGOT) 16 08/15/2017 01:45 PM    Alk. phosphatase 66 08/15/2017 01:45 PM    Protein, total 7.6 08/15/2017 01:45 PM    Albumin 4.9 08/15/2017 01:45 PM    Globulin 2.9 09/03/2014 10:55 AM    A-G Ratio 1.8 08/15/2017 01:45 PM    ALT (SGPT) 12 08/15/2017 01:45 PM           Assessment:     Assessment:     Diagnoses and all orders for this visit:    1. COOPER (dyspnea on exertion)  -     2D ECHO COMPLETE ADULT (TTE) W OR WO CONTR; Future  -     ECHO TTE STRESS EXRCSE COMP W OR WO CONTR; Future    2. Pure hypercholesterolemia  -     AMB POC EKG ROUTINE W/ 12 LEADS, INTER & REP  -     2D ECHO COMPLETE ADULT (TTE) W OR WO CONTR; Future  -     ECHO TTE STRESS EXRCSE COMP W OR WO CONTR; Future    3. Essential hypertension with goal blood pressure less than 130/85  -     2D ECHO COMPLETE ADULT (TTE) W OR WO CONTR; Future  -     ECHO TTE STRESS EXRCSE COMP W OR WO CONTR; Future        ICD-10-CM ICD-9-CM    1. COOPER (dyspnea on exertion) R06.09 786.09 2D ECHO COMPLETE ADULT (TTE) W OR WO CONTR      ECHO TTE STRESS EXRCSE COMP W OR WO CONTR   2. Pure hypercholesterolemia E78.00 272.0 estradiol (ESTRACE) 0.5 mg tablet      dimenhyDRINATE (DRAMAMINE) 50 mg tablet      AMB POC EKG ROUTINE W/ 12 LEADS, INTER & REP      2D ECHO COMPLETE ADULT (TTE) W OR WO CONTR      ECHO TTE STRESS EXRCSE COMP W OR WO CONTR   3. Essential hypertension with goal blood pressure less than 130/85 I10 401.9 2D ECHO COMPLETE ADULT (TTE) W OR WO CONTR      ECHO TTE STRESS EXRCSE COMP W OR WO CONTR     Orders Placed This Encounter    AMB POC EKG ROUTINE W/ 12 LEADS, INTER & REP     Order Specific Question:   Reason for Exam:     Answer:   routine    2D ECHO COMPLETE ADULT (TTE) W OR WO CONTR     Standing Status:   Future     Standing Expiration Date:   5/3/2018     Order Specific Question:   Reason for Exam:     Answer:   HF    ECHO TTE STRESS EXRCSE COMP W OR WO CONTR     Standing Status:   Future     Standing Expiration Date:   5/5/2018     Order Specific Question:   Reason for Exam:     Answer:   cp    estradiol (ESTRACE) 0.5 mg tablet     Sig: Take  by mouth daily.  dimenhyDRINATE (DRAMAMINE) 50 mg tablet     Sig: Take 50 mg by mouth nightly as needed.         Plan:     Patient is a 57-year-old female experiencing episodes of dyspnea on exertion with one flight of stairs as well as atypical nonexertional chest discomfort. Will evaluate for coronary artery disease or structural heart disease with echo and 2D echo. If normal may proceed with GI evaluation. Follow up in 12 months if testing normal and no progressive symptoms after gradual increase exercise and improved diet.       Kierra Valdez MD

## 2017-11-03 NOTE — MR AVS SNAPSHOT
Visit Information Date & Time Provider Department Dept. Phone Encounter #  
 11/3/2017  3:15 PM Oran Lennox, [address] [facility] [phone] [id number] Follow-up Instructions Return in about 1 year (around 11/3/2018). Routing History Follow-up and Disposition History Your Appointments 11/30/2017  8:00 AM  
ECHO CARDIOGRAMS 2D with ECHO, [facility] [address] [place] [address]) Appt Note: Dr. Keyonna Alva (orders to be placed) 5'7\" [facility]  
[phone] [address]  
  
    
 12/1/2017 10:00 AM  
STRESS ECHOCARDIOGRAMS with STRESSECHO, [facility] [address] [place] [address]) Appt Note: Dr. Keyonna Alva ECHO TTE STRESS EXRCSE [facility] [[id number]] (Order [id number]) ,[address] [place] [address]  
[phone] [address]  
  
    
 2/23/2018  9:15 AM  
ROUTINE CARE with Jaida Raya, [address] [place] [address],[address] [place] [address]) Appt Note: 6 month follow up  
 [address] [address]  
[address] [place] [address] Upcoming Health Maintenance Date Due COLONOSCOPY 12/1/2018 BREAST CANCER SCRN MAMMOGRAM 5/25/2019 PAP AKA CERVICAL CYTOLOGY 4/10/2020 DTaP/Tdap/Td series (2 - Td) 1/1/2022 Allergies as of 11/3/2017  Review Complete On: 11/3/2017 By: Oran Lennox, MD  
 No Known Allergies Current Immunizations  Reviewed on 8/15/2017 Name Date Pneumococcal Polysaccharide (PPSV-23) 2/6/2017 Tdap 1/1/2012 Not reviewed this visit You Were Diagnosed With   
  
 Codes Comments COOPER (dyspnea on exertion)    -  Primary ICD-10-CM: R06.09 
ICD-9-CM: 786.09   
 Pure hypercholesterolemia     ICD-10-CM: E78.00 ICD-9-CM: 272.0 Essential hypertension with goal blood pressure less than 130/85     ICD-10-CM: I10 
ICD-9-CM: 401.9 Vitals BP Pulse Resp Height(growth percentile) Weight(growth percentile) SpO2  
 130/80 (BP Patient Position: Sitting) 70 16 5' 7\" (1.702 m) 181 lb 14.4 oz (82.5 kg) 98% BMI OB Status Smoking Status 28.49 kg/m2 Hysterectomy Never Smoker BMI and BSA Data Body Mass Index Body Surface Area  
 28.49 kg/m 2 1.97 m 2 Preferred Pharmacy Pharmacy Name Phone [address]ine Ram[facility]rez [facility] [phone] Your Updated Medication List  
  
   
This list is accurate as of: 11/3/17  4:02 PM.  Always use your most recent med list. ADVIL 200 mg tablet Generic drug:  ibuprofen Take  by mouth as needed. CALTRATE 600 + D PO Take 1 Tab by mouth two (2) times a day. cholecalciferol 1,000 unit tablet Commonly known as:  VITAMIN D3 Take  by mouth daily. DRAMAMINE 50 mg tablet Generic drug:  dimenhyDRINATE Take 50 mg by mouth nightly as needed. ESTRACE 0.5 mg tablet Generic drug:  estradiol Take  by mouth daily. lansoprazole 30 mg capsule Commonly known as:  PREVACID TAKE 1 CAPSULE DAILY BEFORE BREAKFAST  
  
 latanoprost 0.005 % ophthalmic solution Commonly known as:  Joyceann Shock Administer 1 Drop to both eyes nightly. losartan-hydroCHLOROthiazide 100-25 mg per tablet Commonly known as:  HYZAAR Take 1 Tab by mouth daily. polyethylene glycol 17 gram packet Commonly known as:  Beola Galva Take 17 g by mouth daily. simethicone 180 mg Cap Take  by mouth. simvastatin 40 mg tablet Commonly known as:  ZOCOR  
TAKE 1 TABLET DAILY  
  
 ZANTAC 150 mg tablet Generic drug:  raNITIdine Take 150 mg by mouth two (2) times a day. We Performed the Following AMB POC EKG ROUTINE W/ 12 LEADS, INTER & REP [70706 CPT(R)] Follow-up Instructions Return in about 1 year (around 11/3/2018). To-Do List   
 11/03/2017 ECHO:  2D ECHO COMPLETE ADULT (TTE) W OR WO CONTR   
  
 11/03/2017 ECHO:  ECHO TTE STRESS EXRCSE COMP W OR WO CONTR Introducing [facility] & HEALTH SERVICES! [employer] introduces [employer] patient portal. Now you can access parts of your medical record, email your doctor's office, and request medication refills online. 1. In your internet browser, go to [email or web address] 2. Click on the First Time User? Click Here link in the Sign In box. You will see the New Member Sign Up page. 3. Enter your [employer] Access Code exactly as it appears below. You will not need to use this code after youve completed the sign-up process. If you do not sign up before the expiration date, you must request a new code. · [employer] Access Code: [email or web address] Expires: 11/13/2017  1:26 PM 
 
4. Enter the last four digits of your Social Security Number (xxxx) and Date of Birth (mm/dd/yyyy) as indicated and click Submit. You will be taken to the next sign-up page. 5. Create a [employer] ID. This will be your [employer] login ID and cannot be changed, so think of one that is secure and easy to remember. 6. Create a [employer] password. You can change your password at any time. 7. Enter your Password Reset Question and Answer. This can be used at a later time if you forget your password. 8. Enter your e-mail address. You will receive e-mail notification when new information is available in [address] 9. Click Sign Up. You can now view and download portions of your medical record. 10. Click the Download Summary menu link to download a portable copy of your medical information. If you have questions, please visit the Frequently Asked Questions section of the [employer] website. Remember, [employer] is NOT to be used for urgent needs. For medical emergencies, dial 911. Now available from your iPhone and Android! Please provide this summary of care documentation to your next provider. Your primary care clinician is listed as No Mahoney. If you have any questions after today's visit, please call [phone].

## 2017-11-03 NOTE — PROGRESS NOTES
Patient C/O chest discomfort has history of GI problems requesting clearance for upper GI with Dr Dany Douglass.

## 2017-11-27 ENCOUNTER — OFFICE VISIT (OUTPATIENT)
Dept: INTERNAL MEDICINE CLINIC | Age: 67
End: 2017-11-27

## 2017-11-27 ENCOUNTER — TELEPHONE (OUTPATIENT)
Dept: INTERNAL MEDICINE CLINIC | Age: 67
End: 2017-11-27

## 2017-11-27 VITALS
WEIGHT: 184 LBS | HEART RATE: 70 BPM | HEIGHT: 67 IN | TEMPERATURE: 98.3 F | OXYGEN SATURATION: 99 % | SYSTOLIC BLOOD PRESSURE: 116 MMHG | DIASTOLIC BLOOD PRESSURE: 71 MMHG | RESPIRATION RATE: 16 BRPM | BODY MASS INDEX: 28.88 KG/M2

## 2017-11-27 DIAGNOSIS — E66.3 OVERWEIGHT (BMI 25.0-29.9): ICD-10-CM

## 2017-11-27 DIAGNOSIS — E78.00 PURE HYPERCHOLESTEROLEMIA: ICD-10-CM

## 2017-11-27 DIAGNOSIS — Z00.00 WELCOME TO MEDICARE PREVENTIVE VISIT: ICD-10-CM

## 2017-11-27 DIAGNOSIS — M85.89 OSTEOPENIA OF MULTIPLE SITES: ICD-10-CM

## 2017-11-27 DIAGNOSIS — R05.9 COUGH: ICD-10-CM

## 2017-11-27 DIAGNOSIS — I10 ESSENTIAL HYPERTENSION WITH GOAL BLOOD PRESSURE LESS THAN 130/85: Primary | ICD-10-CM

## 2017-11-27 DIAGNOSIS — R73.01 IFG (IMPAIRED FASTING GLUCOSE): ICD-10-CM

## 2017-11-27 DIAGNOSIS — I10 ESSENTIAL HYPERTENSION WITH GOAL BLOOD PRESSURE LESS THAN 130/85: ICD-10-CM

## 2017-11-27 DIAGNOSIS — Z13.31 DEPRESSION SCREEN: ICD-10-CM

## 2017-11-27 RX ORDER — ALBUTEROL SULFATE 90 UG/1
1 AEROSOL, METERED RESPIRATORY (INHALATION)
Qty: 1 INHALER | Refills: 1 | Status: SHIPPED | OUTPATIENT
Start: 2017-11-27 | End: 2018-11-06 | Stop reason: SDUPTHER

## 2017-11-27 RX ORDER — PREDNISONE 20 MG/1
TABLET ORAL
Qty: 12 TAB | Refills: 0 | Status: SHIPPED | OUTPATIENT
Start: 2017-11-27 | End: 2017-12-12

## 2017-11-27 NOTE — TELEPHONE ENCOUNTER
MD Analisa Rivera LPN        Caller: Unspecified (Today,  8:13 AM)                     Can she get here at 1pm today ? ?

## 2017-11-27 NOTE — PATIENT INSTRUCTIONS
Medicare Part B Preventive Services Guidelines/Limitations Date last completed and Frequency Due Date   Bone Mass Measurement  (age 72 & older, biennial) Requires diagnosis related to osteoporosis or estrogen deficiency. Biennial benefit unless patient has history of long-term glucocorticoid tx or baseline is needed because initial test was by other method Completed 8/16 Recommended every 2 years Due 8/18   Cardiovascular Screening Blood Tests (every 5 years)  Total cholesterol, HDL, Triglycerides Order as a panel if possible Completed 12/11  Recommended annually Due    Colorectal Cancer Screening  -Fecal occult blood test (annual)  -Flexible sigmoidoscopy (5y)  -Screening colonoscopy (10y)  -Barium Enema  Completed   Recommended every  years  Due now     Counseling to Prevent Tobacco Use (up to 8 sessions per year)  - Counseling greater than 3 and up to 10 minutes  - Counseling greater than 10 minutes Patients must be asymptomatic of tobacco-related conditions to receive as preventive service n/a n/a   Diabetes Screening Tests (at least every 3 years, Medicare covers annually or at 6-month intervals for prediabetic patients)    Fasting blood sugar (FBS) or glucose tolerance test (GTT) Patient must be diagnosed with one of the following:  -Hypertension, Dyslipidemia, obesity, previous impaired FBS or GTT  Or any two of the following: overweight, FH of diabetes, age ? 72, history of gestational diabetes, birth of baby weighing more than 9 pounds Completed:     Recommended every 3 years for non-diabetics    Recommended every 3-6 months for Pre-Diabetics and Diabetics    2/17 6.1 Due now   Diabetes Self-Management Training (DSMT) (no USPSTF recommendation) Requires referral by treating physician for patient with diabetes or renal disease. 10 hours of initial DSMT session of no less than 30 minutes each in a continuous 12-month period. 2 hours of follow-up DSMT in subsequent years.  n/a n/a   Glaucoma Screening (no USPSTF recommendation) Diabetes mellitus, family history, , age 48 or over,  American, age 72 or over Completed 4/17  Recommended annually Due 4/18   Human Immunodeficiency Virus (HIV) Screening (annually for increased risk patients)  HIV-1 and HIV-2 by EIA, NICKO, rapid antibody test, or oral mucosa transudate Patient must be at increased risk for HIV infection per USPSTF guidelines or pregnant. Tests covered annually for patients at increased risk. Pregnant patients may receive up to 3 test during pregnancy. n/a n/a   Medical Nutrition Therapy (MNT) (for diabetes or renal disease not recommended schedule) Requires referral by treating physician for patient with diabetes or renal disease. Can be provided in same year as diabetes self-management training (DSMT), and CMS recommends medical nutrition therapy take place after DSMT. Up to 3 hours for initial year and 2 hours in subsequent years. n/a n/a   Prostate Cancer Screening (annually up to age 76)  - Digital rectal exam (DELICIA)  - Prostate specific antigen (PSA) Annually (age 48 or over), DELICIA not paid separately when covered E/M service is provided on same date n/a n/a   Seasonal Influenza Vaccination (annually)  Completed   Recommended Annually You decline   Pneumococcal Vaccination (once after 72)  Pneumococcal 23 -2/17  Recommended once over the age of 72    Prevnar 15 - Recommended once over the age of 72 Complete        Due--will complete 2/18   Hepatitis B Vaccinations (if medium/high risk) Medium/high risk factors:  End-stage renal disease,  Hemophiliacs who received Factor VIII or IX concentrates, Clients of institutions for the mentally retarded, Persons who live in the same house as a HepB virus carrier, Homosexual men, Illicit injectable drug abusers. n/a n/a   Screening Mammography (biennial age 54-69)?  Annually (age 36 or over) Completed 5/17   Due 5/18   Screening Pap Tests and Pelvic Examination (up to age 79 and after 79 if unknown history or abnormal study last 10 years) Every 24 months except high risk Completed 4/17 Due 4/19   Ultrasound Screening for Abdominal Aortic Aneurysm (AAA) (once) Patient must be referred through [facility] and not have had a screening for abdominal aortic aneurysm before under Medicare. Limited to patients who meet one of the following criteria:  - Men who are 73-68 years old and have smoked more than 100 cigarettes in their lifetime.  -Anyone with a FH of AAA  -Anyone recommended for screening by USPSTF n/a n/a   Family Practice Management 2011      Zyrtec and flonase daily     Medicare Wellness Visit, Female    The best way to live healthy is to have a healthy lifestyle by eating a well-balanced diet, exercising regularly, limiting alcohol and stopping smoking. Regular physical exams and screening tests are another way to keep healthy. Preventive exams provided by your health care provider can find health problems before they become diseases or illnesses. Preventive services including immunizations, screening tests, monitoring and exams can help you take care of your own health. All people over age 72 should have a pneumovax  and and a prevnar shot to prevent pneumonia. These are once in a lifetime unless you and your provider decide differently. All people over 65 should have a yearly flu shot and a tetanus vaccine every 10 years. A bone mass density to screen for osteoporosis or thinning of the bones should be done every 2 years after 65. Screening for diabetes mellitus with a blood sugar test should be done every year. Glaucoma is a disease of the eye due to increased ocular pressure that can lead to blindness and it should be done every year by an eye professional.    Cardiovascular screening tests that check for elevated lipids (fatty part of blood) which can lead to heart disease and strokes should be done every 5 years.     Colorectal screening that evaluates for blood or polyps in your colon should be done yearly as a stool test or every five years as a flexible sigmoidoscope or every 10 years as a colonoscopy up to age 76. Breast cancer screening with a mammogram is recommended biennially  for women age 54-69. Screening for cervical cancer with a pap smear and pelvic exam is recommended for women after age 72 years every 2 years up to age 79 or when the provider and patient decide to stop. If there is a history of cervical abnormalities or other increased risk for cancer then the test is recommended yearly. Hepatitis C screening is also recommended for anyone born between [birth year] through [address]. A shingles vaccine is also recommended once in a lifetime after age 61. Your Medicare Wellness Exam is recommended annually.     Here is a list of your current Health Maintenance items with a due date:  Health Maintenance Due   Topic Date Due    Glaucoma Screening   11/25/2017    Annual Well Visit  11/25/2017

## 2017-11-27 NOTE — PROGRESS NOTES
This is a \"Welcome to [employer]"  Initial Preventive Physical Examination (IPPE) providing Personalized Prevention Plan Services (Performed in the first 12 months of enrollment)    I have reviewed the patient's medical history in detail and updated the computerized patient record. History     Past Medical History:   Diagnosis Date    GERD - Esophagitis - reflux 3/22/2011    Headache(784.0) 6/27/2011    Hypercholesterolemia 3/22/2011    Hypertension 3/22/2011    Other ill-defined conditions(799.89)     high cholesterol, vertigo, migraine    Ulcer ([address]       Past Surgical History:   Procedure Laterality Date    BREAST SURGERY PROCEDURE UNLISTED      HX BREAST BIOPSY Left 1980s    benign cyst    HX HYSTERECTOMY      HX OTHER SURGICAL      \"hemrrhoid surgery\"     Current Outpatient Prescriptions   Medication Sig Dispense Refill    estradiol (ESTRACE) 0.5 mg tablet Take  by mouth daily.  lansoprazole (PREVACID) 30 mg capsule TAKE 1 CAPSULE DAILY BEFORE BREAKFAST 90 Cap 1    latanoprost (XALATAN) 0.005 % ophthalmic solution Administer 1 Drop to both eyes nightly. 0    ibuprofen (ADVIL) 200 mg tablet Take  by mouth as needed.  raNITIdine (ZANTAC) 150 mg tablet Take 150 mg by mouth two (2) times a day.  simethicone 180 mg cap Take  by mouth.  simvastatin (ZOCOR) 40 mg tablet TAKE 1 TABLET DAILY 90 Tab 1    losartan-hydroCHLOROthiazide (HYZAAR) 100-25 mg per tablet Take 1 Tab by mouth daily. 30 Tab 5    cholecalciferol (VITAMIN D3) 1,000 unit tablet Take  by mouth daily.  CALCIUM CARBONATE/VITAMIN D3 (CALTRATE 600 + D PO) Take 1 Tab by mouth two (2) times a day.  polyethylene glycol (MIRALAX) 17 gram packet Take 17 g by mouth daily.        No Known Allergies  Family History   Problem Relation Age of Onset    Hypertension Mother     Hypertension Father      Social History   Substance Use Topics    Smoking status: Never Smoker    Smokeless tobacco: Never Used    Alcohol use No     Diet, Lifestyle: No special diet, discussed lower carb diet     Exercise level: sedentary walks around house and house work     Depression Risk Screen     PHQ over the last two weeks 11/27/2017   Little interest or pleasure in doing things Not at all   Feeling down, depressed or hopeless Not at all   Total Score PHQ 2 0     Alcohol Risk Screen   You do not drink alcohol or very rarely. --no alcohol     1 cup coffee  Functional Ability and Level of Safety   Hearing Loss  Hearing is good. Vision Screening  Vision is good. Visual Acuity Screening    Right eye Left eye Both eyes   Without correction: 20/30  20/30   With correction:  20/140          Activities of Daily Living  The home contains: no safety equipment. Patient does total self care    Fall Risk Screen  Fall Risk Assessment, last 12 mths 11/27/2017   Able to walk? Yes   Fall in past 12 months? No       Abuse Screen  Patient is not abused    Screening EKG   EKG order placed: No--just completed ekg earlier this month 11/5/17 with cardiology no need to repeat    Patient Care Team   Patient Care Team:  Darian Muse MD as PCP - General (Internal Medicine)  Varghese Winkler MD as Physician (Cardiology)  Maranda Saeed MD (Gastroenterology)  Yocasta Elena MD (Ophthalmology)  Shasha Cristina MD (Obstetrics & Gynecology)   Updated    End of Life Planning   Advanced care planning directives were discussed with the patient and /or family/caregiver. Assessment/Plan   Education and counseling provided:  Are appropriate based on today's review and evaluation  End-of-Life planning (with patient's consent)  Pneumococcal Vaccine  Influenza Vaccine  Colorectal cancer screening tests    Diagnoses and all orders for this visit:    1. Cough  -     XR CHEST PA LAT; Future    2. Essential hypertension with goal blood pressure less than 130/85    3.  Welcome to Medicare preventive visit        Health Maintenance Due   Topic Date Due    GLAUCOMA SCREENING Q2Y  2017    MEDICARE YEARLY EXAM  2017       Discussed with patient about advance medical directive. Provided patient blank AMD and Your Right to Decide Booklet. Requested that if completed to provide a copy of AMD to office. ACP not on file. Began discussion today, SDM is her  Lance Carrillo).       Colonoscopy: 11, Dr. Sarah Donoavn, repeat 7-10 years, will schedule a repeat with Dr. Jyoti Hendrix once cardiac w/u is complete. AAA: [facility] AAA  Pap: Dr. Jennifer Boland,   Mammogram: 17 negative  DEXA: 16 osteopenia due     Tdap:   Pneumovax: 2017  [id number]: not yet needed  Zostavax: holding off d/t cost  Flu shot: declines      Eye exam: Dr. Nitza Betts,     A1c:   6.0 repeat in   Hep C screen:  negative  Lipids:  LDL 90  Annual     EK/3/17, nsr with cardio    Medication reconciliation completed by MA and reviewed by me. Medical/surgical/social/family history reviewed and updated by me. Patient provided AVS and preventative screening table. Patient verbalized understanding of all information discussed.

## 2017-11-27 NOTE — TELEPHONE ENCOUNTER
#[phone] pt states the cough is back from the b/p medication. Pt is coughing the whole phone conversation. Please call as soon as possible.

## 2017-11-27 NOTE — MR AVS SNAPSHOT
Visit Information Date & Time Provider Department Dept. Phone Encounter #  
 11/27/2017  1:00 PM Harish Lujan, [address] [facility] [phone] [id number] Follow-up Instructions Return for as scheduled. Your Appointments 11/30/2017  8:00 AM  
ECHO CARDIOGRAMS 2D with ECHO, [facility] [address] [place] [address]) Appt Note: Dr. Chica Washington (orders to be placed) 5'7\" [facility]  
[phone] [address] [place] [address]  
  
    
 12/1/2017 10:00 AM  
STRESS ECHOCARDIOGRAMS with STRESSECHO, [facility] [address] [place] [address]) Appt Note: Dr. Chica Washington ECHO TTE STRESS EXRCSE 170 [facility] [[id number]] (Order [id number]) ,[address] [place] [address]  
[phone] [address] [place] [address]  
  
    
 2/23/2018  9:15 AM  
ROUTINE CARE with Harish Lujan, [address]) Appt Note: 6 month follow up  
 [facility] [address] [phone]  
[address] [place] [address] [address] [facility] Upcoming Health Maintenance Date Due  
 GLAUCOMA SCREENING Q2Y 11/25/2017 MEDICARE YEARLY EXAM 11/25/2017 Pneumococcal 65+ Low/Medium Risk (1 of 2 - PCV13) 2/6/2018 COLONOSCOPY 12/1/2018 BREAST CANCER SCRN MAMMOGRAM 5/25/2019 PAP AKA CERVICAL CYTOLOGY 4/10/2020 DTaP/Tdap/Td series (2 - Td) 1/1/2022 Allergies as of 11/27/2017  Review Complete On: 11/27/2017 By: Harish Lujan MD  
 No Known Allergies Current Immunizations  Reviewed on 8/15/2017 Name Date Pneumococcal Polysaccharide (PPSV-23) 2/6/2017 Tdap 1/1/2012 Not reviewed this visit You Were Diagnosed With   
  
 Codes Comments Essential hypertension with goal blood pressure less than 130/85    -  Primary ICD-10-CM: I10 
ICD-9-CM: 401.9 Cough     ICD-10-CM: R05 ICD-9-CM: 786.2 Welcome to Medicare preventive visit     ICD-10-CM: Z00.00 ICD-9-CM: V70.0 Pure hypercholesterolemia     ICD-10-CM: E78.00 ICD-9-CM: 272.0 Osteopenia of multiple sites     ICD-10-CM: M85.89 ICD-9-CM: 733.90 IFG (impaired fasting glucose)     ICD-10-CM: R73.01 
ICD-9-CM: 790.21 Overweight (BMI 25.0-29. 9)     ICD-10-CM: H60.2 ICD-9-CM: 278.02 Vitals OB Status Smoking Status Hysterectomy Never Smoker Preferred Pharmacy Pharmacy Name Phone [address] [facility] [phone] Your Updated Medication List  
  
   
This list is accurate as of: 11/27/17  1:29 PM.  Always use your most recent med list. ADVIL 200 mg tablet Generic drug:  ibuprofen Take  by mouth as needed. albuterol 90 mcg/actuation inhaler Commonly known as:  PROAIR HFA Take 1 Puff by inhalation every four (4) hours as needed for Wheezing or Shortness of Breath. CALTRATE 600 + D PO Take 1 Tab by mouth two (2) times a day. cholecalciferol 1,000 unit tablet Commonly known as:  VITAMIN D3 Take  by mouth daily. ESTRACE 0.5 mg tablet Generic drug:  estradiol Take  by mouth daily. lansoprazole 30 mg capsule Commonly known as:  PREVACID TAKE 1 CAPSULE DAILY BEFORE BREAKFAST  
  
 latanoprost 0.005 % ophthalmic solution Commonly known as:  Marcela Simpson Administer 1 Drop to both eyes nightly. losartan-hydroCHLOROthiazide 100-25 mg per tablet Commonly known as:  HYZAAR Take 1 Tab by mouth daily. polyethylene glycol 17 gram packet Commonly known as:  [place] Salt Take 17 g by mouth daily. predniSONE 20 mg tablet Commonly known as:  Isaac Josefina 60mg po daily for 2days, 40mg po daily for 2 days, 20mg po daily for 2days then stop  
  
 simethicone 180 mg Cap Take  by mouth. simvastatin 40 mg tablet Commonly known as:  ZOCOR  
TAKE 1 TABLET DAILY  
  
 ZANTAC 150 mg tablet Generic drug:  raNITIdine Take 150 mg by mouth two (2) times a day. Prescriptions Printed Refills  
 predniSONE (DELTASONE) 20 mg tablet 0 Simg po daily for 2days, 40mg po daily for 2 days, 20mg po daily for 2days then stop Class: Print  
 albuterol (PROAIR HFA) 90 mcg/actuation inhaler 1 Sig: Take 1 Puff by inhalation every four (4) hours as needed for Wheezing or Shortness of Breath. Class: Print Route: Inhalation Follow-up Instructions Return for as scheduled. To-Do List   
 2017 Imaging:  XR CHEST PA LAT   
  
 2017 Lab:  CBC W/O DIFF   
  
 2017 Lab:  HEMOGLOBIN A1C WITH EAG   
  
 2017 Lab:  LIPID PANEL   
  
 2017 Lab:  METABOLIC PANEL, COMPREHENSIVE   
  
 2017 Lab:  TSH 3RD GENERATION Patient Instructions Medicare Part B Preventive Services Guidelines/Limitations Date last completed and Frequency Due Date Bone Mass Measurement 
(age 72 & older, biennial) Requires diagnosis related to osteoporosis or estrogen deficiency. Biennial benefit unless patient has history of long-term glucocorticoid tx or baseline is needed because initial test was by other method Completed  Recommended every 2 years Due  Cardiovascular Screening Blood Tests (every 5 years) Total cholesterol, HDL, Triglycerides Order as a panel if possible Completed  Recommended annually Due Colorectal Cancer Screening 
-Fecal occult blood test (annual) -Flexible sigmoidoscopy (5y) 
-Screening colonoscopy (10y) -Barium Enema  Completed Recommended every  years  Due now Counseling to Prevent Tobacco Use (up to 8 sessions per year) - Counseling greater than 3 and up to 10 minutes - Counseling greater than 10 minutes Patients must be asymptomatic of tobacco-related conditions to receive as preventive service n/a n/a Diabetes Screening Tests (at least every 3 years, Medicare covers annually or at 6-month intervals for prediabetic patients) Fasting blood sugar (FBS) or glucose tolerance test (GTT) Patient must be diagnosed with one of the following: 
-Hypertension, Dyslipidemia, obesity, previous impaired FBS or GTT 
Or any two of the following: overweight, FH of diabetes, age ? 72, history of gestational diabetes, birth of baby weighing more than 9 pounds Completed:  
 
Recommended every 3 years for non-diabetics Recommended every 3-6 months for Pre-Diabetics and Diabetics 2/17 6.1 Due now Diabetes Self-Management Training (DSMT) (no USPSTF recommendation) Requires referral by treating physician for patient with diabetes or renal disease. 10 hours of initial DSMT session of no less than 30 minutes each in a continuous 12-month period. 2 hours of follow-up DSMT in subsequent years. n/a n/a Glaucoma Screening (no USPSTF recommendation) Diabetes mellitus, family history, , age 48 or over,  American, age 72 or over Completed 4/17 Recommended annually Due 4/18 Human Immunodeficiency Virus (HIV) Screening (annually for increased risk patients) HIV-1 and HIV-2 by EIA, NICKO, rapid antibody test, or oral mucosa transudate Patient must be at increased risk for HIV infection per USPSTF guidelines or pregnant. Tests covered annually for patients at increased risk. Pregnant patients may receive up to 3 test during pregnancy. n/a n/a Medical Nutrition Therapy (MNT) (for diabetes or renal disease not recommended schedule) Requires referral by treating physician for patient with diabetes or renal disease. Can be provided in same year as diabetes self-management training (DSMT), and CMS recommends medical nutrition therapy take place after DSMT. Up to 3 hours for initial year and 2 hours in subsequent years.  n/a n/a  
 Prostate Cancer Screening (annually up to age 76) - Digital rectal exam (DELICIA) - Prostate specific antigen (PSA) Annually (age 48 or over), DELICIA not paid separately when covered E/M service is provided on same date n/a n/a Seasonal Influenza Vaccination (annually)  Completed Recommended Annually You decline Pneumococcal Vaccination (once after 65)  Pneumococcal 23 -2/17 Recommended once over the age of 72 Prevnar 13 - Recommended once over the age of 72 Complete Due--will complete 2/18 Hepatitis B Vaccinations (if medium/high risk) Medium/high risk factors:  End-stage renal disease, Hemophiliacs who received Factor VIII or IX concentrates, Clients of institutions for the mentally retarded, Persons who live in the same house as a HepB virus carrier, Homosexual men, Illicit injectable drug abusers. n/a n/a Screening Mammography (biennial age 54-69)? Annually (age 36 or over) Completed 5/17 Due 5/18 Screening Pap Tests and Pelvic Examination (up to age 79 and after 79 if unknown history or abnormal study last 10 years) Every 24 months except high risk Completed 4/17 Due 4/19 Ultrasound Screening for Abdominal Aortic Aneurysm (AAA) (once) Patient must be referred through IPPE and not have had a screening for abdominal aortic aneurysm before under Medicare. Limited to patients who meet one of the following criteria: 
- Men who are 73-68 years old and have smoked more than 100 cigarettes in their lifetime. 
-Anyone with a FH of AAA 
-Anyone recommended for screening by USPSTF n/a n/a Family Practice Management 2011 Zyrtec and flonase daily Medicare Wellness Visit, Female The best way to live healthy is to have a healthy lifestyle by eating a well-balanced diet, exercising regularly, limiting alcohol and stopping smoking. Regular physical exams and screening tests are another way to keep healthy.  Preventive exams provided by your health care provider can find health problems before they become diseases or illnesses. Preventive services including immunizations, screening tests, monitoring and exams can help you take care of your own health. All people over age 72 should have a pneumovax  and and a prevnar shot to prevent pneumonia. These are once in a lifetime unless you and your provider decide differently. All people over 65 should have a yearly flu shot and a tetanus vaccine every 10 years. A bone mass density to screen for osteoporosis or thinning of the bones should be done every 2 years after 65. Screening for diabetes mellitus with a blood sugar test should be done every year. Glaucoma is a disease of the eye due to increased ocular pressure that can lead to blindness and it should be done every year by an eye professional. 
 
Cardiovascular screening tests that check for elevated lipids (fatty part of blood) which can lead to heart disease and strokes should be done every 5 years. Colorectal screening that evaluates for blood or polyps in your colon should be done yearly as a stool test or every five years as a flexible sigmoidoscope or every 10 years as a colonoscopy up to age 76. Breast cancer screening with a mammogram is recommended biennially  for women age 54-69. Screening for cervical cancer with a pap smear and pelvic exam is recommended for women after age 72 years every 2 years up to age 79 or when the provider and patient decide to stop. If there is a history of cervical abnormalities or other increased risk for cancer then the test is recommended yearly. Hepatitis C screening is also recommended for anyone born between 80 through [address]. A shingles vaccine is also recommended once in a lifetime after age 61. Your Medicare Wellness Exam is recommended annually. Here is a list of your current Health Maintenance items with a due date: 
Health Maintenance Due Topic Date Due  Glaucoma Screening   11/25/2017 24 \[facility]\"" Annual Well Visit  11/25/2017 Introducing [facility]! Romayne Duster introduces [employer] patient portal. Now you can access parts of your medical record, email your doctor's office, and request medication refills online. 1. In your internet browser, go to [email or web address] 2. Click on the First Time User? Click Here link in the Sign In box. You will see the New Member Sign Up page. 3. Enter your [employer] Access Code exactly as it appears below. You will not need to use this code after youve completed the sign-up process. If you do not sign up before the expiration date, you must request a new code. · [employer] Access Code: [email or web address] Expires: 2/25/2018  1:29 PM 
 
4. Enter the last four digits of your Social Security Number (xxxx) and Date of Birth (mm/dd/yyyy) as indicated and click Submit. You will be taken to the next sign-up page. 5. Create a [employer] ID. This will be your [employer] login ID and cannot be changed, so think of one that is secure and easy to remember. 6. Create a [employer] password. You can change your password at any time. 7. Enter your Password Reset Question and Answer. This can be used at a later time if you forget your password. 8. Enter your e-mail address. You will receive e-mail notification when new information is available in [address] 9. Click Sign Up. You can now view and download portions of your medical record. 10. Click the Download Summary menu link to download a portable copy of your medical information. If you have questions, please visit the Frequently Asked Questions section of the [employer] website. Remember, [employer] is NOT to be used for urgent needs. For medical emergencies, dial 911. Now available from your iPhone and Android! Please provide this summary of care documentation to your next provider. Your primary care clinician is listed as Wes Marie.  If you have any questions after today's visit, please call [phone].

## 2017-11-27 NOTE — TELEPHONE ENCOUNTER
Called, spoke to pt. Two pt identifiers confirmed. Pt offered and accepted appt for 11/27/17 1300. Pt verbalized understanding of information discussed w/ no further questions at this time.

## 2017-11-27 NOTE — TELEPHONE ENCOUNTER
Called, spoke to pt. Two pt identifiers confirmed. Pt c/o cough x3mo intermittent dry cough. Pt states that same sx as when on lisinopril. Pt now taking losartan-hctz over 1yr. Pt informed it is best for PCP to eval for the cough. Pt asking for an afternoon appt today or Tuesday for she has transportation after 1300. Pt informed Dr. Baudilio Moore will be notified. Pt verbalized understanding of information discussed w/ no further questions at this time.

## 2017-11-30 ENCOUNTER — CLINICAL SUPPORT (OUTPATIENT)
Dept: CARDIOLOGY CLINIC | Age: 67
End: 2017-11-30

## 2017-11-30 DIAGNOSIS — E78.00 PURE HYPERCHOLESTEROLEMIA: ICD-10-CM

## 2017-11-30 DIAGNOSIS — R06.09 DOE (DYSPNEA ON EXERTION): ICD-10-CM

## 2017-11-30 DIAGNOSIS — I10 ESSENTIAL HYPERTENSION WITH GOAL BLOOD PRESSURE LESS THAN 130/85: ICD-10-CM

## 2017-12-01 ENCOUNTER — CLINICAL SUPPORT (OUTPATIENT)
Dept: CARDIOLOGY CLINIC | Age: 67
End: 2017-12-01

## 2017-12-01 DIAGNOSIS — E78.00 PURE HYPERCHOLESTEROLEMIA: ICD-10-CM

## 2017-12-01 DIAGNOSIS — R07.89 OTHER CHEST PAIN: Primary | ICD-10-CM

## 2017-12-01 DIAGNOSIS — R06.09 DOE (DYSPNEA ON EXERTION): ICD-10-CM

## 2017-12-01 DIAGNOSIS — I10 ESSENTIAL HYPERTENSION WITH GOAL BLOOD PRESSURE LESS THAN 130/85: ICD-10-CM

## 2017-12-07 ENCOUNTER — TELEPHONE (OUTPATIENT)
Dept: CARDIOLOGY CLINIC | Age: 67
End: 2017-12-07

## 2017-12-07 NOTE — PROGRESS NOTES
Stress test and echo look good. Nothing to explain chest discomfort based on these. Consider GI evaluation. Follow-up in 12 months if doing well.

## 2017-12-12 ENCOUNTER — OFFICE VISIT (OUTPATIENT)
Dept: INTERNAL MEDICINE CLINIC | Age: 67
End: 2017-12-12

## 2017-12-12 ENCOUNTER — TELEPHONE (OUTPATIENT)
Dept: INTERNAL MEDICINE CLINIC | Age: 67
End: 2017-12-12

## 2017-12-12 VITALS
TEMPERATURE: 98.3 F | HEIGHT: 67 IN | WEIGHT: 181 LBS | OXYGEN SATURATION: 100 % | SYSTOLIC BLOOD PRESSURE: 127 MMHG | DIASTOLIC BLOOD PRESSURE: 68 MMHG | HEART RATE: 71 BPM | RESPIRATION RATE: 16 BRPM | BODY MASS INDEX: 28.41 KG/M2

## 2017-12-12 DIAGNOSIS — I10 ESSENTIAL HYPERTENSION WITH GOAL BLOOD PRESSURE LESS THAN 130/85: ICD-10-CM

## 2017-12-12 DIAGNOSIS — H92.02 EAR PAIN, LEFT: Primary | ICD-10-CM

## 2017-12-12 RX ORDER — GABAPENTIN 100 MG/1
100 CAPSULE ORAL
Qty: 30 CAP | Refills: 0 | Status: SHIPPED | OUTPATIENT
Start: 2017-12-12 | End: 2017-12-12 | Stop reason: SDUPTHER

## 2017-12-12 RX ORDER — LEVOFLOXACIN 500 MG/1
500 TABLET, FILM COATED ORAL DAILY
Qty: 7 TAB | Refills: 0 | Status: SHIPPED | OUTPATIENT
Start: 2017-12-12 | End: 2017-12-19

## 2017-12-12 RX ORDER — LEVOFLOXACIN 500 MG/1
500 TABLET, FILM COATED ORAL DAILY
Qty: 7 TAB | Refills: 0 | Status: SHIPPED | OUTPATIENT
Start: 2017-12-12 | End: 2017-12-12 | Stop reason: SDUPTHER

## 2017-12-12 RX ORDER — GABAPENTIN 100 MG/1
100 CAPSULE ORAL
Qty: 30 CAP | Refills: 0 | Status: SHIPPED | OUTPATIENT
Start: 2017-12-12 | End: 2018-01-02 | Stop reason: DRUGHIGH

## 2017-12-12 NOTE — TELEPHONE ENCOUNTER
Called, spoke to pt. Two pt identifiers confirmed. Pt offered and accepted appt for 12/12/17 1030. Pt verbalized understanding of information discussed w/ no further questions at this time.

## 2017-12-12 NOTE — TELEPHONE ENCOUNTER
#[phone] pt was in last week and treated for cold symptoms & other. Pt now has ear pain that keeps her up at night. Pt would like to be seen today. Please call this morning she ask.

## 2017-12-12 NOTE — PROGRESS NOTES
HISTORY OF PRESENT ILLNESS  Lukas Cristina is a 74 y.o. female. HPI   Last here 11/27/17. Pt is here for acute/routine care.     BP is controled  Continues on losartan-HCTZ daily  Recall pt had a cough on lisinopril. She was taking this medication for 20 years. Wt is stable since last visit   Discussed diet and weight loss     Lov, pt c/o cough and L ear pain  I provided her with prednisone, zyrtec and flonase, which improved her sx  Pt states that her cough improved  However, her L ear pain has worsened (espeically in the PM)  Pt states that she has burning and a sharp pain in this L ear  Pt states that she can neither sleep well at night nor eat properly   Pt denies having jaw pain or ear discharge  Pt is unsure if she has decreased hearing  Pt feels more comfortable putting a cotton ball in this ear  Pt has been applying a hot pad and oil to this area with no improvement to sx  Pt has been taking tylenol with no improvement to sx  Advised her to continue with the flonase and zyrtec OTC  Ordered gabapentin to take qhs prn  Ordered levaquin for 7 days  If her sx progress, will refer to ENT    Pt is taking estradiol    Pt is taking eye drops    Continues on zocor 40mg daily for cholesterol     Continues on prevacid 30mg daily for reflux - works well, no breakthrough     Pt follows with Dr. Mary Hernandez (GI)   Last visit was 8/17  She has been using prevacid daily and zantac BID for reflux - somewhat works well  She has been taking dramamine prn and miralax BID to have nl BM     COLO/EGD scheduled for 2/28/18  Recall she previously followed with Dr. Mary Hernandez about her stomach and episodes of diarrhea. Her stomach pain has resolved, continues on prevacid for this. Previously took amitiza prn but no longer on this, had hemorrhoid surgery. Doing well      Pt was referred to Dr. Belem Licea (cardio) by Dr. Mary Hernandez (GI)   Last visit was 11/3/17  Reviewed ECHO 11/30/17: nl EF  Reviewed stress test 12/1/17: nl      ACP not on file.  SDM is her  Felicity Bo).     PREVENTIVE:  Colonoscopy: 11, Dr. [place], repeat 7-10 years, scheduled for 18 with Dr. Nick Deras  EGD: scheduled for 18 with Dr. Nick Deras  AAA: [facility] AAA  Pap: Dr. Ismael Clayton,   Mammogram: 17 negative  DEXA: 16 osteopenia  Tdap:   Pneumovax: 2017  Ylnopdp41: not yet needed  Zostavax: holding off d/t cost  Flu shot: declines    A1c: 10/16 6.4,  6.1,  6.0  Eye exam: Dr. Anamika Fox,   Hep C screen:  negative  Lipids:  LDL 90  EK/3/17, nsr with cardio    Patient Active Problem List    Diagnosis Date Noted    Essential hypertension with goal blood pressure less than 130/85 2016    Pure hypercholesterolemia 2016    Osteopenia 2012    Constipation 2011    Glaucoma 2011    Vertigo 2011    Headache(784.0) 2011    GERD - Esophagitis - reflux 2011     Current Outpatient Prescriptions   Medication Sig Dispense Refill    levoFLOXacin (LEVAQUIN) 500 mg tablet Take 1 Tab by mouth daily for 7 days. 7 Tab 0    gabapentin (NEURONTIN) 100 mg capsule Take 1 Cap by mouth nightly as needed. 30 Cap 0    albuterol (PROAIR HFA) 90 mcg/actuation inhaler Take 1 Puff by inhalation every four (4) hours as needed for Wheezing or Shortness of Breath. 1 Inhaler 1    estradiol (ESTRACE) 0.5 mg tablet Take  by mouth daily.  lansoprazole (PREVACID) 30 mg capsule TAKE 1 CAPSULE DAILY BEFORE BREAKFAST 90 Cap 1    latanoprost (XALATAN) 0.005 % ophthalmic solution Administer 1 Drop to both eyes nightly. 0    raNITIdine (ZANTAC) 150 mg tablet Take 150 mg by mouth two (2) times a day.  simethicone 180 mg cap Take  by mouth.  simvastatin (ZOCOR) 40 mg tablet TAKE 1 TABLET DAILY 90 Tab 1    losartan-hydroCHLOROthiazide (HYZAAR) 100-25 mg per tablet Take 1 Tab by mouth daily. 30 Tab 5    cholecalciferol (VITAMIN D3) 1,000 unit tablet Take  by mouth daily.      [facility] D3 (CALTRATE 600 + D PO) Take 1 Tab by mouth two (2) times a day.  polyethylene glycol (MIRALAX) 17 gram packet Take 17 g by mouth daily. Past Surgical History:   Procedure Laterality Date    BREAST SURGERY PROCEDURE UNLISTED      HX BREAST BIOPSY Left 1980s    benign cyst    HX HYSTERECTOMY      HX OTHER SURGICAL      \"hemrrhoid surgery\"      Lab Results  Component Value Date/Time   WBC 4.7 02/03/2017 11:54 AM   HGB 11.8 02/03/2017 11:54 AM   HCT 34.7 02/03/2017 11:54 AM   PLATELET 903 34/92/8369 11:54 AM   MCV 90 02/03/2017 11:54 AM     Lab Results  Component Value Date/Time   Cholesterol, total 174 08/15/2017 01:45 PM   HDL Cholesterol 69 08/15/2017 01:45 PM   LDL, calculated 90 08/15/2017 01:45 PM   Triglyceride 77 08/15/2017 01:45 PM     Lab Results  Component Value Date/Time   GFR est non-AA 60 08/15/2017 01:45 PM   GFR est AA 70 08/15/2017 01:45 PM   Creatinine 0.99 08/15/2017 01:45 PM   BUN 16 08/15/2017 01:45 PM   Sodium 143 08/15/2017 01:45 PM   Potassium 5.0 08/15/2017 01:45 PM   Chloride 99 08/15/2017 01:45 PM   CO2 25 08/15/2017 01:45 PM   Magnesium 1.9 09/03/2014 10:55 AM          Review of Systems   HENT: Positive for ear pain. Negative for ear discharge. Respiratory: Negative for cough and shortness of breath. Cardiovascular: Negative for chest pain. Musculoskeletal: Negative for joint pain. Physical Exam   Constitutional: She is oriented to person, place, and time. She appears well-developed and well-nourished. No distress. HENT:   Head: Normocephalic and atraumatic. Right Ear: External ear normal.   Left Ear: External ear normal.   TTP over posterior auricular lymph node behind L jaw   Eyes: Conjunctivae and EOM are normal. Right eye exhibits no discharge. Left eye exhibits no discharge. Neck: Normal range of motion. Neck supple. Cardiovascular: Normal rate, regular rhythm and normal heart sounds. Exam reveals no gallop and no friction rub.     No murmur heard.  Pulmonary/Chest: Effort normal and breath sounds normal. No respiratory distress. She has no wheezes. She has no rales. She exhibits no tenderness. Musculoskeletal: Normal range of motion. She exhibits no edema, tenderness or deformity. Lymphadenopathy:     She has no cervical adenopathy. Neurological: She is alert and oriented to person, place, and time. Coordination normal.   Skin: Skin is warm and dry. No rash noted. She is not diaphoretic. No erythema. No pallor. Psychiatric: She has a normal mood and affect. Her behavior is normal.       ASSESSMENT and PLAN    ICD-10-CM ICD-9-CM    1. Ear pain, left    Levaquin for 7 days, gabapentin for the shooting pain, if not improving after 1-2 weeks will send to ENT   H92.02 388.70    2. Essential hypertension with goal blood pressure less than 130/85    Well-controled on losartan-HCTZ   I10 401.9         Scribed by [address], as dictated by Dr. Jeannette Morgan. Current diagnosis and concerns discussed with pt at length. Pt understands risks and benefits or current treatment plan and medications, and accepts the treatment and medication with any possible risks. Pt asks appropriate questions, which were answered. Pt was instructed to call with any concerns or problems. This note will not be viewable in [address]

## 2017-12-12 NOTE — MR AVS SNAPSHOT
Visit Information Date & Time Provider Department Dept. Phone Encounter #  
 12/12/2017 10:30 AM Matt Simpson, [address] [phone] [id number] Follow-up Instructions Return for as scheduled. Your Appointments 2/23/2018  9:15 AM  
ROUTINE CARE with Matt Simpson, [address] [facility]) Appt Note: 6 month follow up  
 [address] [address]  
[address] [facility] [address] Upcoming Health Maintenance Date Due  
 GLAUCOMA SCREENING Q2Y 11/25/2017 Pneumococcal 65+ Low/Medium Risk (1 of 2 - PCV13) 2/6/2018 MEDICARE YEARLY EXAM 11/28/2018 COLONOSCOPY 12/1/2018 BREAST CANCER SCRN MAMMOGRAM 5/25/2019 PAP AKA CERVICAL CYTOLOGY 4/10/2020 DTaP/Tdap/Td series (2 - Td) 1/1/2022 Allergies as of 12/12/2017  Review Complete On: 11/27/2017 By: Matt Simpson MD  
 No Known Allergies Current Immunizations  Reviewed on 8/15/2017 Name Date Pneumococcal Polysaccharide (PPSV-23) 2/6/2017 Tdap 1/1/2012 Not reviewed this visit You Were Diagnosed With   
  
 Codes Comments Ear pain, left    -  Primary ICD-10-CM: H92.02 
ICD-9-CM: 388.70 Essential hypertension with goal blood pressure less than 130/85     ICD-10-CM: I10 
ICD-9-CM: 401.9 Vitals OB Status Smoking Status Hysterectomy Never Smoker Preferred Pharmacy Pharmacy Name Phone [address] [facility] [phone] Your Updated Medication List  
  
   
This list is accurate as of: 12/12/17 10:45 AM.  Always use your most recent med list.  
  
  
  
  
 albuterol 90 mcg/actuation inhaler Commonly known as:  PROAIR HFA Take 1 Puff by inhalation every four (4) hours as needed for Wheezing or Shortness of Breath.   
  
 CALTRATE 600 + D PO  
 Take 1 Tab by mouth two (2) times a day. cholecalciferol 1,000 unit tablet Commonly known as:  VITAMIN D3 Take  by mouth daily. ESTRACE 0.5 mg tablet Generic drug:  estradiol Take  by mouth daily. gabapentin 100 mg capsule Commonly known as:  NEURONTIN Take 1 Cap by mouth nightly as needed. lansoprazole 30 mg capsule Commonly known as:  PREVACID TAKE 1 CAPSULE DAILY BEFORE BREAKFAST  
  
 latanoprost 0.005 % ophthalmic solution Commonly known as:  Jeff Gloria Administer 1 Drop to both eyes nightly. levoFLOXacin 500 mg tablet Commonly known as:  Amanda Born Take 1 Tab by mouth daily for 7 days. losartan-hydroCHLOROthiazide 100-25 mg per tablet Commonly known as:  HYZAAR Take 1 Tab by mouth daily. polyethylene glycol 17 gram packet Commonly known as:  Ashanti Look Take 17 g by mouth daily. simethicone 180 mg Cap Take  by mouth. simvastatin 40 mg tablet Commonly known as:  ZOCOR  
TAKE 1 TABLET DAILY  
  
 ZANTAC 150 mg tablet Generic drug:  raNITIdine Take 150 mg by mouth two (2) times a day. Prescriptions Printed Refills  
 levoFLOXacin (LEVAQUIN) 500 mg tablet 0 Sig: Take 1 Tab by mouth daily for 7 days. Class: Print Route: Oral  
 gabapentin (NEURONTIN) 100 mg capsule 0 Sig: Take 1 Cap by mouth nightly as needed. Class: Print Route: Oral  
  
Follow-up Instructions Return for as scheduled. Introducing [facility] & HEALTH SERVICES! Evan Danielle introduces [employer] patient portal. Now you can access parts of your medical record, email your doctor's office, and request medication refills online. 1. In your internet browser, go to [email or web address] 2. Click on the First Time User? Click Here link in the Sign In box. You will see the New Member Sign Up page. 3. Enter your [employer] Access Code exactly as it appears below.  You will not need to use this code after youve completed the sign-up process. If you do not sign up before the expiration date, you must request a new code. · [employer] Access Code: [email or web address] Expires: 2/25/2018  1:29 PM 
 
4. Enter the last four digits of your Social Security Number (xxxx) and Date of Birth (mm/dd/yyyy) as indicated and click Submit. You will be taken to the next sign-up page. 5. Create a ConsiderC ID. This will be your [employer] login ID and cannot be changed, so think of one that is secure and easy to remember. 6. Create a Consider[employer] password. You can change your password at any time. 7. Enter your Password Reset Question and Answer. This can be used at a later time if you forget your password. 8. Enter your e-mail address. You will receive e-mail notification when new information is available in [address] 9. Click Sign Up. You can now view and download portions of your medical record. 10. Click the Download Summary menu link to download a portable copy of your medical information. If you have questions, please visit the Frequently Asked Questions section of the [employer] website. Remember, [employer] is NOT to be used for urgent needs. For medical emergencies, dial 911. Now available from your iPhone and Android! Please provide this summary of care documentation to your next provider. Your primary care clinician is listed as Jaida Raya. If you have any questions after today's visit, please call [phone].

## 2017-12-13 RX ORDER — LOSARTAN POTASSIUM AND HYDROCHLOROTHIAZIDE 25; 100 MG/1; MG/1
TABLET ORAL
Qty: 33 TAB | Refills: 0 | Status: SHIPPED | OUTPATIENT
Start: 2017-12-13 | End: 2018-01-28 | Stop reason: SDUPTHER

## 2017-12-28 ENCOUNTER — TELEPHONE (OUTPATIENT)
Dept: INTERNAL MEDICINE CLINIC | Age: 67
End: 2017-12-28

## 2017-12-28 DIAGNOSIS — H92.03 OTALGIA OF BOTH EARS: Primary | ICD-10-CM

## 2017-12-28 NOTE — TELEPHONE ENCOUNTER
#[phone] pt states the medication did not work for ear pain. She was told to call and she would be referred to a specialist.    Please call pt with this information.

## 2018-01-02 RX ORDER — GABAPENTIN 300 MG/1
300 CAPSULE ORAL
Qty: 30 CAP | Refills: 1 | Status: ON HOLD | OUTPATIENT
Start: 2018-01-02 | End: 2018-02-28

## 2018-01-02 NOTE — TELEPHONE ENCOUNTER
#[phone] pt states she is going away on Saturday for two weeks. Pt is requesting something for pain to be called into [facility] on file. She states the other pain medication is not helping at all so she stopped taking. Please call pt to let her know about this as she has been waiting to hear from someone since 12-28-17. Thanks.

## 2018-01-02 NOTE — TELEPHONE ENCOUNTER
MD Rosalva Sands LPN        Caller: Unspecified (5 days ago, 10:04 AM)                     Can increase gabapentin from 100mg qhs to 300mg qhs to help with pain     See ent as discussed

## 2018-01-02 NOTE — TELEPHONE ENCOUNTER
Called, spoke to pt. Two pt identifiers confirmed. Pt states that L ear pain is ongoing. Pt finished abx and gabapentin given 12/12/17. Pt states aching pain intermittent. Pt states chewing aggravates the pain. Pt tried sweet oil per pharmacist w/ cotton and no alleviation. Pt referred to Dr. Juliana Kruse per Dr. Lisette Roach contact info. Pt leaving out of town this weekend for 2wks and asking if something more can be given for her pain. Pt informed Dr. Selene Devine will be notified. Pt verbalized understanding of information discussed w/ no further questions at this time.

## 2018-01-02 NOTE — TELEPHONE ENCOUNTER
Called, spoke to pt. Two pt identifiers confirmed. Pt informed per Dr. Ayanna Alcaraz that she could increase gabapentin from 100mg to 300mg qhs to help with pain-pt agreed. Pt asking it be sent into [facility] on file. Pended. Pt verbalized understanding of information discussed w/ no further questions at this time.

## 2018-01-28 RX ORDER — LOSARTAN POTASSIUM AND HYDROCHLOROTHIAZIDE 25; 100 MG/1; MG/1
TABLET ORAL
Qty: 90 TAB | Refills: 0 | Status: SHIPPED | OUTPATIENT
Start: 2018-01-28 | End: 2018-05-07 | Stop reason: SDUPTHER

## 2018-01-31 RX ORDER — LANSOPRAZOLE 30 MG/1
CAPSULE, DELAYED RELEASE ORAL
Qty: 90 CAP | Refills: 1 | Status: ON HOLD | OUTPATIENT
Start: 2018-01-31 | End: 2018-02-28

## 2018-01-31 RX ORDER — SIMVASTATIN 40 MG/1
TABLET, FILM COATED ORAL
Qty: 90 TAB | Refills: 1 | Status: SHIPPED | OUTPATIENT
Start: 2018-01-31 | End: 2018-07-09 | Stop reason: SDUPTHER

## 2018-02-27 ENCOUNTER — ANESTHESIA EVENT (OUTPATIENT)
Dept: ENDOSCOPY | Age: 68
End: 2018-02-27
Payer: MEDICARE

## 2018-02-27 RX ORDER — LORATADINE 10 MG/1
10 TABLET ORAL DAILY
COMMUNITY
End: 2018-10-01

## 2018-02-27 RX ORDER — FLUTICASONE PROPIONATE 50 MCG
2 SPRAY, SUSPENSION (ML) NASAL DAILY
COMMUNITY
End: 2022-06-06

## 2018-02-27 NOTE — ANESTHESIA PREPROCEDURE EVALUATION
Anesthetic History   No history of anesthetic complications            Review of Systems / Medical History  Patient summary reviewed, nursing notes reviewed and pertinent labs reviewed    Pulmonary  Within defined limits              Comments: Environmental and seasonal allergies   Neuro/Psych         Headaches (Migraine)     Cardiovascular    Hypertension          Hyperlipidemia    Exercise tolerance: >4 METS  Comments:  EKG:  SR     ECHO Stress NEGATIVE     ECHO:  55-60% EF, mild TR and MR   GI/Hepatic/Renal     GERD          Comments: Abnormal imaging of digestive tract, benign ruslan stomach, change in bowel habits, noncardiac chest pain, constipation, diarrhea, duodenitis, gastritis, GERD, H. Pylori pos treated x 2, hemorrhoids, melena, nausea, nonspec abdominal pain Endo/Other        Arthritis     Other Findings   Comments: Vertigo  Glaucoma  Osteopenia         Physical Exam    Airway  Mallampati: II  TM Distance: 4 - 6 cm  Neck ROM: normal range of motion   Mouth opening: Normal     Cardiovascular  Regular rate and rhythm,  S1 and S2 normal,  no murmur, click, rub, or gallop             Dental  No notable dental hx       Pulmonary  Breath sounds clear to auscultation               Abdominal  GI exam deferred       Other Findings            Anesthetic Plan    ASA: 2  Anesthesia type: total IV anesthesia          Induction: Intravenous  Anesthetic plan and risks discussed with: Patient

## 2018-02-28 ENCOUNTER — ANESTHESIA (OUTPATIENT)
Dept: ENDOSCOPY | Age: 68
End: 2018-02-28
Payer: MEDICARE

## 2018-02-28 ENCOUNTER — HOSPITAL ENCOUNTER (OUTPATIENT)
Age: 68
Setting detail: OUTPATIENT SURGERY
Discharge: HOME | End: 2018-02-28
Attending: INTERNAL MEDICINE | Admitting: INTERNAL MEDICINE
Payer: MEDICARE

## 2018-02-28 VITALS
WEIGHT: 180.25 LBS | TEMPERATURE: 97.5 F | HEART RATE: 69 BPM | OXYGEN SATURATION: 99 % | BODY MASS INDEX: 28.97 KG/M2 | RESPIRATION RATE: 11 BRPM | HEIGHT: 66 IN | SYSTOLIC BLOOD PRESSURE: 116 MMHG | DIASTOLIC BLOOD PRESSURE: 76 MMHG

## 2018-02-28 LAB
H PYLORI FROM TISSUE: NEGATIVE
KIT LOT NO., HCLOLOT: NORMAL
NEGATIVE CONTROL: NEGATIVE
POSITIVE CONTROL: POSITIVE

## 2018-02-28 PROCEDURE — 76040000007: Performed by: INTERNAL MEDICINE

## 2018-02-28 PROCEDURE — 77030009426 HC FCPS BIOP ENDOSC BSC -B: Performed by: INTERNAL MEDICINE

## 2018-02-28 PROCEDURE — 74011000250 HC RX REV CODE- 250

## 2018-02-28 PROCEDURE — 87077 CULTURE AEROBIC IDENTIFY: CPT | Performed by: INTERNAL MEDICINE

## 2018-02-28 PROCEDURE — 88305 TISSUE EXAM BY PATHOLOGIST: CPT | Performed by: INTERNAL MEDICINE

## 2018-02-28 PROCEDURE — 74011250636 HC RX REV CODE- 250/636: Performed by: INTERNAL MEDICINE

## 2018-02-28 PROCEDURE — 76060000032 HC ANESTHESIA 0.5 TO 1 HR: Performed by: INTERNAL MEDICINE

## 2018-02-28 PROCEDURE — 74011250636 HC RX REV CODE- 250/636

## 2018-02-28 RX ORDER — DEXTROMETHORPHAN/PSEUDOEPHED 2.5-7.5/.8
1.2 DROPS ORAL
Status: DISCONTINUED | OUTPATIENT
Start: 2018-02-28 | End: 2018-02-28 | Stop reason: HOSPADM

## 2018-02-28 RX ORDER — MIDAZOLAM HYDROCHLORIDE 1 MG/ML
.25-5 INJECTION, SOLUTION INTRAMUSCULAR; INTRAVENOUS
Status: DISCONTINUED | OUTPATIENT
Start: 2018-02-28 | End: 2018-02-28 | Stop reason: HOSPADM

## 2018-02-28 RX ORDER — SODIUM CHLORIDE, SODIUM LACTATE, POTASSIUM CHLORIDE, CALCIUM CHLORIDE 600; 310; 30; 20 MG/100ML; MG/100ML; MG/100ML; MG/100ML
50 INJECTION, SOLUTION INTRAVENOUS CONTINUOUS
Status: DISCONTINUED | OUTPATIENT
Start: 2018-02-28 | End: 2018-02-28 | Stop reason: HOSPADM

## 2018-02-28 RX ORDER — LIDOCAINE HYDROCHLORIDE 20 MG/ML
INJECTION, SOLUTION EPIDURAL; INFILTRATION; INTRACAUDAL; PERINEURAL AS NEEDED
Status: DISCONTINUED | OUTPATIENT
Start: 2018-02-28 | End: 2018-02-28 | Stop reason: HOSPADM

## 2018-02-28 RX ORDER — SODIUM CHLORIDE 0.9 % (FLUSH) 0.9 %
5-10 SYRINGE (ML) INJECTION AS NEEDED
Status: DISCONTINUED | OUTPATIENT
Start: 2018-02-28 | End: 2018-02-28 | Stop reason: HOSPADM

## 2018-02-28 RX ORDER — PHENYLEPHRINE HCL IN 0.9% NACL 0.4MG/10ML
SYRINGE (ML) INTRAVENOUS AS NEEDED
Status: DISCONTINUED | OUTPATIENT
Start: 2018-02-28 | End: 2018-02-28 | Stop reason: HOSPADM

## 2018-02-28 RX ORDER — SODIUM CHLORIDE 0.9 % (FLUSH) 0.9 %
5-10 SYRINGE (ML) INJECTION EVERY 8 HOURS
Status: DISCONTINUED | OUTPATIENT
Start: 2018-02-28 | End: 2018-02-28 | Stop reason: HOSPADM

## 2018-02-28 RX ORDER — FLUMAZENIL 0.1 MG/ML
0.2 INJECTION INTRAVENOUS
Status: DISCONTINUED | OUTPATIENT
Start: 2018-02-28 | End: 2018-02-28 | Stop reason: HOSPADM

## 2018-02-28 RX ORDER — ATROPINE SULFATE 0.1 MG/ML
0.5 INJECTION INTRAVENOUS
Status: DISCONTINUED | OUTPATIENT
Start: 2018-02-28 | End: 2018-02-28 | Stop reason: HOSPADM

## 2018-02-28 RX ORDER — NALOXONE HYDROCHLORIDE 0.4 MG/ML
0.4 INJECTION, SOLUTION INTRAMUSCULAR; INTRAVENOUS; SUBCUTANEOUS
Status: DISCONTINUED | OUTPATIENT
Start: 2018-02-28 | End: 2018-02-28 | Stop reason: HOSPADM

## 2018-02-28 RX ORDER — EPINEPHRINE 0.1 MG/ML
1 INJECTION INTRACARDIAC; INTRAVENOUS
Status: DISCONTINUED | OUTPATIENT
Start: 2018-02-28 | End: 2018-02-28 | Stop reason: HOSPADM

## 2018-02-28 RX ORDER — SODIUM CHLORIDE 9 MG/ML
75 INJECTION, SOLUTION INTRAVENOUS CONTINUOUS
Status: DISCONTINUED | OUTPATIENT
Start: 2018-02-28 | End: 2018-02-28 | Stop reason: HOSPADM

## 2018-02-28 RX ORDER — PANTOPRAZOLE SODIUM 40 MG/1
40 TABLET, DELAYED RELEASE ORAL DAILY
Qty: 60 TAB | Refills: 3 | Status: SHIPPED | OUTPATIENT
Start: 2018-02-28 | End: 2018-07-09 | Stop reason: SDUPTHER

## 2018-02-28 RX ORDER — MIDAZOLAM HYDROCHLORIDE 1 MG/ML
1-2 INJECTION, SOLUTION INTRAMUSCULAR; INTRAVENOUS
Status: DISCONTINUED | OUTPATIENT
Start: 2018-02-28 | End: 2018-02-28 | Stop reason: HOSPADM

## 2018-02-28 RX ORDER — PROPOFOL 10 MG/ML
INJECTION, EMULSION INTRAVENOUS AS NEEDED
Status: DISCONTINUED | OUTPATIENT
Start: 2018-02-28 | End: 2018-02-28 | Stop reason: HOSPADM

## 2018-02-28 RX ADMIN — PROPOFOL 300 MG: 10 INJECTION, EMULSION INTRAVENOUS at 08:14

## 2018-02-28 RX ADMIN — SODIUM CHLORIDE, SODIUM LACTATE, POTASSIUM CHLORIDE, AND CALCIUM CHLORIDE 50 ML/HR: 600; 310; 30; 20 INJECTION, SOLUTION INTRAVENOUS at 07:32

## 2018-02-28 RX ADMIN — LIDOCAINE HYDROCHLORIDE 50 MG: 20 INJECTION, SOLUTION EPIDURAL; INFILTRATION; INTRACAUDAL; PERINEURAL at 07:46

## 2018-02-28 RX ADMIN — Medication 80 MCG: at 08:03

## 2018-02-28 NOTE — ANESTHESIA POSTPROCEDURE EVALUATION
Post-Anesthesia Evaluation and Assessment    Patient: Jeni Wooten MRN: [id number]  SSN: [id number]    YOB: 1950  Age: 74 y.o. Sex: female       Cardiovascular Function/Vital Signs  Visit Vitals    /68    Pulse 70    Temp 36.4 °C (97.5 °F)    Resp 19    Ht 5' 6\" (1.676 m)    Wt 81.8 kg (180 lb 4 oz)    SpO2 100%    Breastfeeding No    BMI 29.09 kg/m2       Patient is status post total IV anesthesia anesthesia for Procedure(s):  ESOPHAGOGASTRODUODENOSCOPY (EGD)  COLONOSCOPY  ESOPHAGOGASTRODUODENAL (EGD) BIOPSY  COLON BIOPSY. Nausea/Vomiting: None    Postoperative hydration reviewed and adequate. Pain:  Pain Scale 1: Numeric (0 - 10) (02/28/18 0826)  Pain Intensity 1: 0 (02/28/18 0826)   Managed    Neurological Status: At baseline    Mental Status and Level of Consciousness: Arousable    Pulmonary Status:   O2 Device: Room air (02/28/18 0826)   Adequate oxygenation and airway patent    Complications related to anesthesia: None    Post-anesthesia assessment completed.  No concerns    Signed By: Marquis Worley MD     February 28, 2018

## 2018-02-28 NOTE — PROCEDURES
[place] Office: [phone]      Esophagogastroduodenoscopy Procedure Note      Cheo Sera  1950  [id number]    Indication: cough, change in bowel     : Julio Dennis MD    Referring Provider:  Madhu Jay MD    Sedation:  MAC anesthesia Propofol    Procedure Details:  After detailed informed consent was obtained for the procedure, with all risks and benefits of procedure explained the patient was taken to the endoscopy suite and placed in the left lateral decubitus position. Following sequential administration of sedation as per above, the endoscope was inserted into the mouth and advanced under direct vision to second portion of the duodenum. A careful inspection was made as the gastroscope was withdrawn, including a retroflexed view of the proximal stomach; findings and interventions are described below. Findings:     Esophagus: The esophageal mucosa in the proximal and mid esophagus is normal.  There is grade A distal esophagitis. The squamo-columnar junction is at 40 cm where the Z-line was noted. There is a 2 cm hiatal hernia     Stomach: The gastric mucosa has erythema in the body. Biopsies taken and KIN testing done. There are multiple, small shallow antral and pre-pyloric ulcers/erosions. Biopsied. The fundus was found to be normal with no lesions noted on retroflexion. The angularis is normal as well. Duodenum:   The bulb and post bulbar mucosa is normal in appearance. The duodenal folds are normal. Biopsies taken. Therapies:  biopsy of stomach body, antrum, KIN test  biopsy of duodenal second portion    Specimen:  Specimens were collected as described and send to the laboratory. Complications:   None were encountered during the procedure. EBL:  None. Recommendations:     -Acid suppression with a proton pump inhibitor. ,   -Await pathology. ,   -Await KIN test result and treat for Helicobacter pylori if positive. ,   -Follow symptoms. ,   -No NSAIDS        Andrew Gaytan MD  2/28/2018  7:54 AM

## 2018-02-28 NOTE — IP AVS SNAPSHOT
[address] [facility] 
[phone] Patient: Sergo Mendieta MRN: [id number] VRN:[id number] About your hospitalization You were admitted on:  February 28, 2018 You last received care in the:  [facility] ENDOSCOPY You were discharged on:  February 28, 2018 Why you were hospitalized Your primary diagnosis was:  Not on File Follow-up Information None Your Scheduled Appointments Tuesday March 27, 2018  8:30 AM EDT ROUTINE CARE with Drew Carrera, [address]  
[phone] Discharge Orders None A check senia indicates which time of day the medication should be taken. My Medications START taking these medications Instructions Each Dose to Equal  
 Morning Noon Evening Bedtime  
 pantoprazole 40 mg tablet Commonly known as:  PROTONIX Your last dose was: Your next dose is: Take 1 Tab by mouth daily. 40 mg CONTINUE taking these medications Instructions Each Dose to Equal  
 Morning Noon Evening Bedtime  
 albuterol 90 mcg/actuation inhaler Commonly known as:  PROAIR HFA Your last dose was: Your next dose is: Take 1 Puff by inhalation every four (4) hours as needed for Wheezing or Shortness of Breath. 1 Puff CALTRATE 600 + D PO Your last dose was: Your next dose is: Take 1 Tab by mouth two (2) times a day. 1 Tab  
    
   
   
   
  
 cholecalciferol 1,000 unit tablet Commonly known as:  VITAMIN D3 Your last dose was: Your next dose is: Take  by mouth daily. CLARITIN 10 mg tablet Generic drug:  loratadine Your last dose was: Your next dose is: Take 10 mg by mouth daily. 10 mg  
    
   
   
   
  
 ESTRACE 0.5 mg tablet Generic drug:  estradiol Your last dose was: Your next dose is: Take  by mouth daily. FLONASE ALLERGY RELIEF 50 mcg/actuation nasal spray Generic drug:  fluticasone Your last dose was: Your next dose is: 2 Sprays by Both Nostrils route daily. 2 Spray  
    
   
   
   
  
 latanoprost 0.005 % ophthalmic solution Commonly known as:  Jacqualyn Jackson Your last dose was: Your next dose is:    
   
   
 Administer 1 Drop to both eyes nightly. 1 Drop  
    
   
   
   
  
 losartan-hydroCHLOROthiazide 100-25 mg per tablet Commonly known as:  HYZAAR Your last dose was: Your next dose is: TAKE 1 TABLET BY MOUTH EVERY DAY  
     
   
   
   
  
 polyethylene glycol 17 gram packet Commonly known as:  Yvetta Slice Your last dose was: Your next dose is: Take 17 g by mouth daily. 17 g  
    
   
   
   
  
 simvastatin 40 mg tablet Commonly known as:  ZOCOR Your last dose was: Your next dose is: TAKE 1 TABLET DAILY  
     
   
   
   
  
 ZANTAC 150 mg tablet Generic drug:  raNITIdine Your last dose was: Your next dose is: Take 150 mg by mouth two (2) times daily as needed. 150 mg Where to Get Your Medications Information on where to get these meds will be given to you by the nurse or doctor. ! Ask your nurse or doctor about these medications  
  pantoprazole 40 mg tablet Discharge Instructions [place] Office: [phone] Elliot Gentile [phone] 
1950 EGD/COLONOSCOPY DISCHARGE INSTRUCTIONS Discomfort: 
Sore throat- throat lozenges or warm salt water gargle 
redness at IV site- apply warm compress to area; if redness or soreness persist- contact your physician Gaseous discomfort- walking, belching will help relieve any discomfort You may not operate a vehicle for 12 hours You may not engage in an occupation involving machinery or appliances for rest of today. You may not drink alcoholic beverages for at least 12 hours Avoid making any critical decisions for at least 24 hour DIET You may resume your regular diet  however -  remember your colon is empty and a heavy meal will produce gas. Avoid these foods:  fried / greasy foods, excessive carbonated drinks or too much caffeine MEDICATIONS Regarding Aspirin or Nonsteroidal medications specifically, please see below. ACTIVITY You may resume your normal daily activities. Spend the remainder of the day resting -  avoid any strenuous activity. CALL M.D. ANY SIGN OF Increasing pain, nausea, vomiting Abdominal distension (swelling) New increased bleeding (oral or rectal) Fever (chills) Pain in chest area Bloody discharge from nose or mouth Shortness of breath You may not take any Advil, Aspirin, Ibuprofen, Motrin, Aleve, or Goodys for 7 days, ONLY  Tylenol as needed for pain. Follow-up Instructions: 
 Call  Andrew Finley MD for any questions or concerns Results of procedure / biopsy in 7 days Telephone # [phone] Follow-up Information None [employer] Activation Thank you for requesting access to [employer]. Please follow the instructions below to securely access and download your online medical record. [employer] allows you to send messages to your doctor, view your test results, renew your prescriptions, schedule appointments, and more. How Do I Sign Up? 1. In your internet browser, go to [email or web address] 
2. Click on the First Time User? Click Here link in the Sign In box. You will be redirect to the New Member Sign Up page. 3. Enter your [employer] Access Code exactly as it appears below.  You will not need to use this code after youve completed the sign-up process. If you do not sign up before the expiration date, you must request a new code. [employer] Access Code: [email or web address] Expires: 2018  6:23 AM (This is the date your [employer] access code will ) 4. Enter the last four digits of your Social Security Number (xxxx) and Date of Birth (mm/dd/yyyy) as indicated and click Submit. You will be taken to the next sign-up page. 5. Create a JamStart ID. This will be your [employer] login ID and cannot be changed, so think of one that is secure and easy to remember. 6. Create a [employer] password. You can change your password at any time. 7. Enter your Password Reset Question and Answer. This can be used at a later time if you forget your password. 8. Enter your e-mail address. You will receive e-mail notification when new information is available in [address] 9. Click Sign Up. You can now view and download portions of your medical record. 10. Click the Download Summary menu link to download a portable copy of your medical information. Additional Information If you have questions, please visit the Frequently Asked Questions section of the [employer] website at [email or web address] Remember, [employer] is NOT to be used for urgent needs. For medical emergencies, dial 911. Introducing [facility] & HEALTH SERVICES! [facility] introduces [employer] patient portal. Now you can access parts of your medical record, email your doctor's office, and request medication refills online. 1. In your internet browser, go to [email or web address] 2. Click on the First Time User? Click Here link in the Sign In box. You will see the New Member Sign Up page. 3. Enter your [employer] Access Code exactly as it appears below. You will not need to use this code after youve completed the sign-up process.  If you do not sign up before the expiration date, you must request a new code. 
 
· [employer] Access Code: [email or web address] Expires: 5/29/2018  6:23 AM 
 
4. Enter the last four digits of your Social Security Number (xxxx) and Date of Birth (mm/dd/yyyy) as indicated and click Submit. You will be taken to the next sign-up page. 5. Create a Achieve3000t ID. This will be your [employer] login ID and cannot be changed, so think of one that is secure and easy to remember. 6. Create a [employer] password. You can change your password at any time. 7. Enter your Password Reset Question and Answer. This can be used at a later time if you forget your password. 8. Enter your e-mail address. You will receive e-mail notification when new information is available in [address] 9. Click Sign Up. You can now view and download portions of your medical record. 10. Click the Download Summary menu link to download a portable copy of your medical information. If you have questions, please visit the Frequently Asked Questions section of the [employer] website. Remember, [employer] is NOT to be used for urgent needs. For medical emergencies, dial 911. Now available from your iPhone and Android! Providers Seen During Your Hospitalization Provider Specialty Primary office phone Judy Martin MD Gastroenterology [phone] Your Primary Care Physician (PCP) Primary Care Physician Office Phone Office Fax Tony Singhener [phone] You are allergic to the following No active allergies Recent Documentation Height Weight Breastfeeding? BMI OB Status Smoking Status 1.676 m 81.8 kg No 29.09 kg/m2 Hysterectomy Never Smoker Emergency Contacts Name Discharge Info Relation Home Work Mobile [address] CAREGIVER [3] Spouse [3] [phone] Patient Belongings The following personal items are in your possession at time of discharge: 
  Dental Appliances: None  Visual Aid: None Please provide this summary of care documentation to your next provider. Signatures-by signing, you are acknowledging that this After Visit Summary has been reviewed with you and you have received a copy. Patient Signature:  ____________________________________________________________ Date:  ____________________________________________________________  
  
Damian Payor Provider Signature:  ____________________________________________________________ Date:  ____________________________________________________________

## 2018-02-28 NOTE — H&P
Pre-endoscopy H and P    The patient was seen and examined in the room/pre-op holding area. The airway was assessed and documented. The problem list, past medical history, and medications were reviewed. Patient Active Problem List   Diagnosis Code    GERD - Esophagitis - reflux K20.9    Headache(784.0) R46    Constipation K59.00    Glaucoma H40.9    Vertigo R42    Osteopenia M85.80    Essential hypertension with goal blood pressure less than 130/85 I10    Pure hypercholesterolemia E78.00     Social History     Social History    Marital status:      Spouse name: N/A    Number of children: N/A    Years of education: N/A     Occupational History    Not on file. Social History Main Topics    Smoking status: Never Smoker    Smokeless tobacco: Never Used    Alcohol use No    Drug use: No    Sexual activity: Not on file     Other Topics Concern    Not on file     Social History Narrative     Past Medical History:   Diagnosis Date    Arthritis     narrowing of spine    Environmental and seasonal allergies     GERD - Esophagitis - reflux 3/22/2011    Headache(784.0) 6/27/2011    Hypercholesterolemia 3/22/2011    Hypertension 3/22/2011    Other ill-defined conditions(799.89)     high cholesterol, vertigo, migraine    Ulcer ([facility] [address]          Prior to Admission Medications   Prescriptions Last Dose Informant Patient Reported? Taking? CALCIUM CARBONATE/VITAMIN D3 (CALTRATE 600 + D PO) 2/27/2018 at Unknown time  Yes Yes   Sig: Take 1 Tab by mouth two (2) times a day. albuterol (PROAIR HFA) 90 mcg/actuation inhaler Not Taking at Unknown time  No No   Sig: Take 1 Puff by inhalation every four (4) hours as needed for Wheezing or Shortness of Breath. cholecalciferol (VITAMIN D3) 1,000 unit tablet 2/27/2018 at Unknown time  Yes Yes   Sig: Take  by mouth daily. estradiol (ESTRACE) 0.5 mg tablet 2/27/2018 at Unknown time  Yes Yes   Sig: Take  by mouth daily.    fluticasone (FLONASE ALLERGY RELIEF) 50 mcg/actuation nasal spray 2018 at Unknown time  Yes Yes   Si Sprays by Both Nostrils route daily. latanoprost (XALATAN) 0.005 % ophthalmic solution 2018 at Unknown time  Yes Yes   Sig: Administer 1 Drop to both eyes nightly. loratadine (CLARITIN) 10 mg tablet 2018 at Unknown time  Yes Yes   Sig: Take 10 mg by mouth daily. losartan-hydroCHLOROthiazide (HYZAAR) 100-25 mg per tablet 2018 at Unknown time  No Yes   Sig: TAKE 1 TABLET BY MOUTH EVERY DAY   polyethylene glycol (MIRALAX) 17 gram packet 2018 at Unknown time  Yes Yes   Sig: Take 17 g by mouth daily. raNITIdine (ZANTAC) 150 mg tablet 2018  Yes No   Sig: Take 150 mg by mouth two (2) times daily as needed. simvastatin (ZOCOR) 40 mg tablet 2018 at Unknown time  No Yes   Sig: TAKE 1 TABLET DAILY      Facility-Administered Medications: None           The review of systems is:  Negative  for shortness of breath or chest pain      The heart, lungs, and mental status were satisfactory for the administration of deep sedation and for the procedure. I discussed with the patient the objectives, risks, consequences and alternatives to the procedure.       Kelvin Leventhal, MD  2018  7:42 AM

## 2018-02-28 NOTE — DISCHARGE INSTRUCTIONS
[place] Office: [phone]    Maude Escamilla  [id number]  1950    EGD/COLONOSCOPY DISCHARGE INSTRUCTIONS  Discomfort:  Sore throat- throat lozenges or warm salt water gargle  redness at IV site- apply warm compress to area; if redness or soreness persist- contact your physician  Gaseous discomfort- walking, belching will help relieve any discomfort  You may not operate a vehicle for 12 hours  You may not engage in an occupation involving machinery or appliances for rest of today. You may not drink alcoholic beverages for at least 12 hours  Avoid making any critical decisions for at least 24 hour  DIET  You may resume your regular diet - however -  remember your colon is empty and a heavy meal will produce gas. Avoid these foods:  fried / greasy foods, excessive carbonated drinks or too much caffeine  MEDICATIONS   Regarding Aspirin or Nonsteroidal medications specifically, please see below. ACTIVITY  You may resume your normal daily activities. Spend the remainder of the day resting -  avoid any strenuous activity. CALL M.D. ANY SIGN OF   Increasing pain, nausea, vomiting  Abdominal distension (swelling)  New increased bleeding (oral or rectal)  Fever (chills)  Pain in chest area  Bloody discharge from nose or mouth  Shortness of breath    You may not take any Advil, Aspirin, Ibuprofen, Motrin, Aleve, or Goodys for 7 days, ONLY  Tylenol as needed for pain. Follow-up Instructions:   Call  Mubashir A. Terald Romberg, MD for any questions or concerns  Results of procedure / biopsy in 7 days   Telephone # [phone]      Follow-up Information     None         [facility] Activation    Thank you for requesting access to [facility]. Please follow the instructions below to securely access and download your online medical record. [facility] allows you to send messages to your doctor, view your test results, renew your prescriptions, schedule appointments, and more. How Do I Sign Up? 1.  In your internet browser, go to [email or web address]  2. Click on the First Time User? Click Here link in the Sign In box. You will be redirect to the New Member Sign Up page. 3. Enter your [employer] Access Code exactly as it appears below. You will not need to use this code after youve completed the sign-up process. If you do not sign up before the expiration date, you must request a new code. [employer] Access Code: Noris Harrell  Expires: 2018  6:23 AM (This is the date your [employer] access code will )    4. Enter the last four digits of your Social Security Number (xxxx) and Date of Birth (mm/dd/yyyy) as indicated and click Submit. You will be taken to the next sign-up page. 5. Create a [employer] AGt ID. This will be your [employer] login ID and cannot be changed, so think of one that is secure and easy to remember. 6. Create a [employer] password. You can change your password at any time. 7. Enter your Password Reset Question and Answer. This can be used at a later time if you forget your password. 8. Enter your e-mail address. You will receive e-mail notification when new information is available in [address] 9. Click Sign Up. You can now view and download portions of your medical record. 10. Click the Download Summary menu link to download a portable copy of your medical information. Additional Information    If you have questions, please visit the Frequently Asked Questions section of the [employer] website at [email or web address] Remember, [employer] is NOT to be used for urgent needs. For medical emergencies, dial 911.

## 2018-02-28 NOTE — PERIOP NOTES
Anesthesia reports 300mg Propofol, 50mg Lidocaine and 400mL LR given during procedure. Received report from anesthesia staff on vital signs and status of patient.

## 2018-02-28 NOTE — PROCEDURES
Colonoscopy Procedure Note    Anaid García  1950  [id number]        Pre-operative Diagnosis: , CHANGE IN BOWEL HABIT,     Post-operative Diagnosis: internal hemorrhoids, diverticulosis    : Andrew Abdullahi MD    Referring Provider: Fatemeh George MD    Sedation:  MAC anesthesia Propofol        Procedure Details:    After detailed informed consent was obtained with all risks and benefits of procedure explained and preoperative exam completed, the patient was taken to the endoscopy suite and placed in the left lateral decubitus position. Upon sequential sedation as per above, a digital rectal exam was performed  And was normal.  The Olympus videocolonoscope  was inserted in the rectum and carefully advanced to the cecum, which was identified by the ileocecal valve and appendiceal orifice. The quality of preparation was good. The colonoscope was slowly withdrawn with careful evaluation between folds. Retroflexion in the rectum was performed. Findings:     · The terminal ileum is normal.  · There are a few sigmoid diverticula. · There are no colonic polyps or masses. · Rest of the colonic mucosa is normal. Multiple biopsies are taken. · Medium sized internal hemorrhoids are noted. Therapies:  biopsy of colon : random colon    Specimen:   Specimens were collected as described above and sent to pathology. Complications: None were encountered during the procedure. EBL:  None. Recommendations:     -Await pathology.  -For colon cancer screening in this average-risk patient, colonoscopy may be repeated in 10 years.  -High fiber diet.  -Follow up with primary care physician.    -Naturally, for new bleeding, unexplained weight loss,change in bowel habits and anemia, an earlier colonoscopy should be considered. Andrew Abdullahi MD  2/28/2018  8:11 AM

## 2018-02-28 NOTE — ROUTINE PROCESS
Kaylyn Nap  1950  [id number]    Situation:  Verbal report received from: Vane Lin RN  Procedure: Procedure(s):  ESOPHAGOGASTRODUODENOSCOPY (EGD)  COLONOSCOPY  ESOPHAGOGASTRODUODENAL (EGD) BIOPSY  COLON BIOPSY    Background:    Preoperative diagnosis: ABNORMAL FINDINGS ON DX IMAGING OF PRT DIGESTIVE TRACT, BENIGN JERRELL STOMACH, CHANGE IN BOWEL HABIT, CHEST PAIN, UNSPECIFIED, CONSTIPATION, DIARRHEA, DUODENITIS WITHOUT BLEEDING, GASTRITIS WITH DUODENITIS UNSPECIFIED, GASTROESOPHAGEAL REFLUX DISEASE, H   PYLORI + 887.23, HELICOBACTER PYLORI INFECTION-TREATED X 2, HEMORRHOIDS, MELENA, NAUSEA, OTHER DISEASES OF STOMACH AND DUODENUM, PAIN, ABDOMINAL NOS  Postoperative diagnosis: egd- hiatal hernia, esophagitis, gastric ulcer, gastritis  colon- diverticulosis, hemorrhoids  colon-        :  Dr. Kate Cuevas  Assistant(s): Endoscopy Technician-1: Nancy Olivera  Endoscopy RN-1: Sabrina Junior RN    Specimens:   ID Type Source Tests Collected by Time Destination   1 : dudenum biopsy Preservative Duodenum  Rafita Chapman MD 2/28/2018 0409 Pathology   2 : gastric ulcer biopsy Preservative   Rafita Chapman MD 2/28/2018 0833 Pathology   3 : gastric body biopsy Preservative   Andrew Eason MD 2/28/2018 0759 Pathology   4 : random colon biopsy Preservative   Andrew Eason MD 2/28/2018 0806 Pathology     H. Pylori  yes    Assessment:  Intra-procedure medications     Anesthesia gave intra-procedure sedation and medications, see anesthesia flow sheet yes    Intravenous fluids: LR @ KVO     Vital signs stable       Abdominal assessment: round and soft       Recommendation:  Discharge patient per MD order  .     Family or Friend  Langmartina Welch   Permission to share finding with family or friend yes

## 2018-05-07 RX ORDER — LOSARTAN POTASSIUM AND HYDROCHLOROTHIAZIDE 25; 100 MG/1; MG/1
TABLET ORAL
Qty: 90 TAB | Refills: 0 | Status: SHIPPED | OUTPATIENT
Start: 2018-05-07 | End: 2018-09-07 | Stop reason: SDUPTHER

## 2018-06-04 ENCOUNTER — TELEPHONE (OUTPATIENT)
Dept: INTERNAL MEDICINE CLINIC | Age: 68
End: 2018-06-04

## 2018-06-04 DIAGNOSIS — M89.9 BONE DISEASE: Primary | ICD-10-CM

## 2018-06-04 NOTE — TELEPHONE ENCOUNTER
Pt will need an order for a bone density test sent to the practice.      [address] (A) [phone]  (F) [phone]       Message received & copied from [facility]

## 2018-06-05 NOTE — TELEPHONE ENCOUNTER
Called, spoke to pt. Two pt identifiers confirmed. Pt informed per Dr. Bea Moreira DXA ordered and Not to get it before 8/20/18 d/t insurance. Pt verbalized understanding of information discussed w/ no further questions at this time.

## 2018-06-07 ENCOUNTER — HOSPITAL ENCOUNTER (OUTPATIENT)
Dept: LAB | Age: 68
Discharge: HOME | End: 2018-06-07
Payer: MEDICARE

## 2018-06-07 ENCOUNTER — APPOINTMENT (OUTPATIENT)
Dept: INTERNAL MEDICINE CLINIC | Age: 68
End: 2018-06-07

## 2018-06-07 PROCEDURE — 36415 COLL VENOUS BLD VENIPUNCTURE: CPT

## 2018-06-07 PROCEDURE — 84443 ASSAY THYROID STIM HORMONE: CPT

## 2018-06-07 PROCEDURE — 83036 HEMOGLOBIN GLYCOSYLATED A1C: CPT

## 2018-06-07 PROCEDURE — 80053 COMPREHEN METABOLIC PANEL: CPT

## 2018-06-07 PROCEDURE — 85027 COMPLETE CBC AUTOMATED: CPT

## 2018-06-07 PROCEDURE — 80061 LIPID PANEL: CPT

## 2018-06-08 LAB
ALBUMIN SERPL-MCNC: 5.1 G/DL (ref 3.6–4.8)
ALBUMIN/GLOB SERPL: 2.2 {RATIO} (ref 1.2–2.2)
ALP SERPL-CCNC: 61 IU/L (ref 39–117)
ALT SERPL-CCNC: 14 IU/L (ref 0–32)
AST SERPL-CCNC: 17 IU/L (ref 0–40)
BILIRUB SERPL-MCNC: 0.3 MG/DL (ref 0–1.2)
BUN SERPL-MCNC: 13 MG/DL (ref 8–27)
BUN/CREAT SERPL: 15 (ref 12–28)
CALCIUM SERPL-MCNC: 10.1 MG/DL (ref 8.7–10.3)
CHLORIDE SERPL-SCNC: 100 MMOL/L (ref 96–106)
CHOLEST SERPL-MCNC: 138 MG/DL (ref 100–199)
CO2 SERPL-SCNC: 25 MMOL/L (ref 18–29)
CREAT SERPL-MCNC: 0.86 MG/DL (ref 0.57–1)
ERYTHROCYTE [DISTWIDTH] IN BLOOD BY AUTOMATED COUNT: 13.9 % (ref 12.3–15.4)
EST. AVERAGE GLUCOSE BLD GHB EST-MCNC: 126 MG/DL
GFR SERPLBLD CREATININE-BSD FMLA CKD-EPI: 71 ML/MIN/1.73
GFR SERPLBLD CREATININE-BSD FMLA CKD-EPI: 82 ML/MIN/1.73
GLOBULIN SER CALC-MCNC: 2.3 G/DL (ref 1.5–4.5)
GLUCOSE SERPL-MCNC: 85 MG/DL (ref 65–99)
HBA1C MFR BLD: 6 % (ref 4.8–5.6)
HCT VFR BLD AUTO: 36.8 % (ref 34–46.6)
HDLC SERPL-MCNC: 62 MG/DL
HGB BLD-MCNC: 12.5 G/DL (ref 11.1–15.9)
LDLC SERPL CALC-MCNC: 62 MG/DL (ref 0–99)
MCH RBC QN AUTO: 30.4 PG (ref 26.6–33)
MCHC RBC AUTO-ENTMCNC: 34 G/DL (ref 31.5–35.7)
MCV RBC AUTO: 90 FL (ref 79–97)
PLATELET # BLD AUTO: 344 X10E3/UL (ref 150–379)
POTASSIUM SERPL-SCNC: 4.2 MMOL/L (ref 3.5–5.2)
PROT SERPL-MCNC: 7.4 G/DL (ref 6–8.5)
RBC # BLD AUTO: 4.11 X10E6/UL (ref 3.77–5.28)
SODIUM SERPL-SCNC: 141 MMOL/L (ref 134–144)
TRIGL SERPL-MCNC: 69 MG/DL (ref 0–149)
TSH SERPL DL<=0.005 MIU/L-ACNC: 2.13 UIU/ML (ref 0.45–4.5)
VLDLC SERPL CALC-MCNC: 14 MG/DL (ref 5–40)
WBC # BLD AUTO: 4.5 X10E3/UL (ref 3.4–10.8)

## 2018-07-09 ENCOUNTER — OFFICE VISIT (OUTPATIENT)
Dept: INTERNAL MEDICINE CLINIC | Age: 68
End: 2018-07-09

## 2018-07-09 VITALS
DIASTOLIC BLOOD PRESSURE: 63 MMHG | HEIGHT: 66 IN | RESPIRATION RATE: 16 BRPM | SYSTOLIC BLOOD PRESSURE: 111 MMHG | WEIGHT: 181 LBS | BODY MASS INDEX: 29.09 KG/M2 | OXYGEN SATURATION: 99 % | HEART RATE: 67 BPM | TEMPERATURE: 99.2 F

## 2018-07-09 DIAGNOSIS — R05.9 COUGH: ICD-10-CM

## 2018-07-09 DIAGNOSIS — I10 ESSENTIAL HYPERTENSION WITH GOAL BLOOD PRESSURE LESS THAN 130/85: Primary | ICD-10-CM

## 2018-07-09 DIAGNOSIS — R73.01 IFG (IMPAIRED FASTING GLUCOSE): ICD-10-CM

## 2018-07-09 DIAGNOSIS — R05.9 COUGH: Primary | ICD-10-CM

## 2018-07-09 DIAGNOSIS — K21.9 GASTROESOPHAGEAL REFLUX DISEASE WITHOUT ESOPHAGITIS: ICD-10-CM

## 2018-07-09 DIAGNOSIS — Z23 ENCOUNTER FOR IMMUNIZATION: ICD-10-CM

## 2018-07-09 DIAGNOSIS — E78.00 PURE HYPERCHOLESTEROLEMIA: ICD-10-CM

## 2018-07-09 DIAGNOSIS — M85.89 OSTEOPENIA OF MULTIPLE SITES: ICD-10-CM

## 2018-07-09 RX ORDER — SIMVASTATIN 40 MG/1
TABLET, FILM COATED ORAL
Qty: 90 TAB | Refills: 1 | Status: SHIPPED | OUTPATIENT
Start: 2018-07-09 | End: 2019-03-02 | Stop reason: SDUPTHER

## 2018-07-09 RX ORDER — METHYLPREDNISOLONE 4 MG/1
TABLET ORAL
Qty: 1 DOSE PACK | Refills: 0 | Status: SHIPPED | OUTPATIENT
Start: 2018-07-09 | End: 2018-10-01

## 2018-07-09 RX ORDER — PANTOPRAZOLE SODIUM 40 MG/1
40 TABLET, DELAYED RELEASE ORAL DAILY
Qty: 90 TAB | Refills: 1 | Status: SHIPPED | OUTPATIENT
Start: 2018-07-09 | End: 2018-10-01 | Stop reason: SDUPTHER

## 2018-07-09 RX ORDER — AMOXICILLIN AND CLAVULANATE POTASSIUM 875; 125 MG/1; MG/1
1 TABLET, FILM COATED ORAL 2 TIMES DAILY
Qty: 14 TAB | Refills: 0 | Status: SHIPPED | OUTPATIENT
Start: 2018-07-09 | End: 2018-07-16

## 2018-07-09 NOTE — MR AVS SNAPSHOT
[address] [place] [address] 
[phone] Patient: Tony Blum MRN: [id number] SDO:86/69/[id number] Visit Information Date & Time Provider Department Dept. Phone Encounter #  
 7/9/2018  1:45 PM Dawson Betancourt, [id number] Follow-up Instructions Return in about 6 months (around 1/9/2019). Upcoming Health Maintenance Date Due  
 GLAUCOMA SCREENING Q2Y 11/25/2017 Pneumococcal 65+ Low/Medium Risk (1 of 2 - PCV13) 2/6/2018 Influenza Age 5 to Adult 8/1/2018 MEDICARE YEARLY EXAM 11/28/2018 BREAST CANCER SCRN MAMMOGRAM 5/25/2019 DTaP/Tdap/Td series (2 - Td) 1/1/2022 COLONOSCOPY 2/28/2025 Allergies as of 7/9/2018  Review Complete On: 7/9/2018 By: Dawson Betancourt MD  
 No Known Allergies Current Immunizations  Reviewed on 7/9/2018 Name Date Pneumococcal Polysaccharide (PPSV-23) 2/6/2017 Tdap 1/1/2012 Reviewed by Dawson Betancourt MD on 7/9/2018 at  2:09 PM  
You Were Diagnosed With   
  
 Codes Comments Essential hypertension with goal blood pressure less than 130/85    -  Primary ICD-10-CM: I10 
ICD-9-CM: 401.9 Pure hypercholesterolemia     ICD-10-CM: E78.00 ICD-9-CM: 272.0 Gastroesophageal reflux disease without esophagitis     ICD-10-CM: K21.9 ICD-9-CM: 530.81 IFG (impaired fasting glucose)     ICD-10-CM: R73.01 
ICD-9-CM: 790.21 Osteopenia of multiple sites     ICD-10-CM: M85.89 ICD-9-CM: 733.90 Cough     ICD-10-CM: R05 ICD-9-CM: 721. 2 Vitals BP Pulse Temp Resp Height(growth percentile) Weight(growth percentile) 111/63 (BP 1 Location: Left arm, BP Patient Position: Sitting) 67 99.2 °F (37.3 °C) (Oral) 16 5' 6\" (1.676 m) 181 lb (82.1 kg) SpO2 BMI OB Status Smoking Status 99% 29.21 kg/m2 Hysterectomy Never Smoker Vitals History BMI and BSA Data Body Mass Index Body Surface Area  
 29.21 kg/m 2 1.96 m 2 Preferred Pharmacy Pharmacy Name Phone [facility] [address] [facility]  [place] [phone] Your Updated Medication List  
  
   
This list is accurate as of 7/9/18  2:12 PM.  Always use your most recent med list.  
  
  
  
  
 albuterol 90 mcg/actuation inhaler Commonly known as:  PROAIR HFA Take 1 Puff by inhalation every four (4) hours as needed for Wheezing or Shortness of Breath. amoxicillin-clavulanate 875-125 mg per tablet Commonly known as:  AUGMENTIN Take 1 Tab by mouth two (2) times a day for 7 days. CALTRATE 600 + D PO Take 1 Tab by mouth two (2) times a day. cholecalciferol 1,000 unit tablet Commonly known as:  VITAMIN D3 Take  by mouth daily. CLARITIN 10 mg tablet Generic drug:  loratadine Take 10 mg by mouth daily. FLONASE ALLERGY RELIEF 50 mcg/actuation nasal spray Generic drug:  fluticasone 2 Sprays by Both Nostrils route daily. latanoprost 0.005 % ophthalmic solution Commonly known as:  Kris Palestine Administer 1 Drop to both eyes nightly. losartan-hydroCHLOROthiazide 100-25 mg per tablet Commonly known as:  HYZAAR  
TAKE 1 TABLET BY MOUTH EVERY DAY  
  
 methylPREDNISolone 4 mg tablet Commonly known as:  Danella Michelle Per pack  
  
 pantoprazole 40 mg tablet Commonly known as:  PROTONIX Take 1 Tab by mouth daily. polyethylene glycol 17 gram packet Commonly known as:  Yamileth Dequan Take 17 g by mouth daily. simvastatin 40 mg tablet Commonly known as:  ZOCOR  
TAKE 1 TABLET DAILY  
  
 ZANTAC 150 mg tablet Generic drug:  raNITIdine Take 150 mg by mouth two (2) times daily as needed. Prescriptions Sent to Pharmacy Refills  
 pantoprazole (PROTONIX) 40 mg tablet 1 Sig: Take 1 Tab by mouth daily.   
 Class: Normal  
 Pharmacy: [address] Ph #: [phone] Route: Oral  
 simvastatin (ZOCOR) 40 mg tablet 1 Sig: TAKE 1 TABLET DAILY Class: Normal  
 Pharmacy: [facility] [address] AT [address] [place] [address] Ph #: [phone]  
 methylPREDNISolone (MEDROL DOSEPACK) 4 mg tablet 0 Sig: Per pack Class: Normal  
 Pharmacy: [place] [facility] [address] AT [address] [place] [address] Ph #: [phone]  
 amoxicillin-clavulanate (AUGMENTIN) 875-125 mg per tablet 0 Sig: Take 1 Tab by mouth two (2) times a day for 7 days. Class: Normal  
 Pharmacy: [address] Ph #: [phone] Route: Oral  
  
We Performed the Following REFERRAL TO ENT-OTOLARYNGOLOGY [DWG64 Custom] Follow-up Instructions Return in about 6 months (around 1/9/2019). To-Do List   
 07/09/2018 Imaging:  XR CHEST PA LAT   
  
 07/10/2018 3:00 PM  
  Appointment with [facility] 1 at [facility] ([phone]) Shower or bathe using soap and water. Do not use deodorant, powder, perfumes, or lotion the day of your exam.  If your prior mammograms were not performed at [place] [address] please bring films with you or forward prior images 2 days before your procedure. Check in at registration 15min before your appointment time unless you were instructed to do otherwise. A script is not necessary, but if you have one, please bring it on the day of the mammogram or have it faxed to the department. You are responsible for finding a method of transportation to your appointment. If you don't have transportation, please reschedule your appointment at least 24 hours in advance.   [facility] [phone] [facility]  [phone] [facility] [phone] TATIAN[place]  [phone] [facility] [phone] [facility] [phone] [facility] [phone] Vernal Mater [phone]  
  
 09/06/2018 1:30 PM  
  Appointment with [facility] 1 at [address] [place] [address] ([phone]) Please, no calcium supplements or antacids that coat the stomach (ex: Tums, Mylanta) 24 hours prior to procedure. Maintain normal diet and medications. Dairy products are allowed. Wear an outfit with an elastic waistband (no zipper or metal snaps). Check in at registration 15min before your appointment time unless you were instructed to do otherwise. Referral Information Referral ID Referred By Referred To  
  
 [id number] GENNA [id number] Quang [facility] [address] [place] [address] Fax: [phone] Visits Status Start Date End Date 1 New Request 7/9/18 7/9/19 If your referral has a status of pending review or denied, additional information will be sent to support the outcome of this decision. Patient Instructions Change claritin to zyrtec 10mg  
 
flonase everyday Xray today If not better ENT evaluation Introducing [facility]! Kenny Doshi introduces [employer] patient portal. Now you can access parts of your medical record, email your doctor's office, and request medication refills online. 1. In your internet browser, go to [email or web address] 2. Click on the First Time User? Click Here link in the Sign In box. You will see the New Member Sign Up page. 3. Enter your [employer] Access Code exactly as it appears below. You will not need to use this code after youve completed the sign-up process. If you do not sign up before the expiration date, you must request a new code. · [employer] Access Code: [email or web address] Expires: 9/4/2018 10:01 AM 
 
4. Enter the last four digits of your Social Security Number (xxxx) and Date of Birth (mm/dd/yyyy) as indicated and click Submit.  You will be taken to the next sign-up page. 5. Create a FriendsClear ID. This will be your [employer] login ID and cannot be changed, so think of one that is secure and easy to remember. 6. Create a [employer] password. You can change your password at any time. 7. Enter your Password Reset Question and Answer. This can be used at a later time if you forget your password. 8. Enter your e-mail address. You will receive e-mail notification when new information is available in [address] 9. Click Sign Up. You can now view and download portions of your medical record. 10. Click the Download Summary menu link to download a portable copy of your medical information. If you have questions, please visit the Frequently Asked Questions section of the [employer] website. Remember, [employer] is NOT to be used for urgent needs. For medical emergencies, dial 911. Now available from your iPhone and Android! Please provide this summary of care documentation to your next provider. Your primary care clinician is listed as Yoli Rodríguez. If you have any questions after today's visit, please call [phone].

## 2018-07-09 NOTE — PROGRESS NOTES
HISTORY OF PRESENT ILLNESS  Yunior Darling is a 74 y.o. female. HPI  Last here 12/12/17. Pt is here for acute/routine care.      BP is 111/63  Continues on losartan-HCTZ daily  No home readings for review  Recall pt had a cough on lisinopril. She was taking this medication for 20 years.     Wt is stable since last visit   Discussed diet and weight loss    Reviewed labs 2/18 and 6/18     Pt is taking estradiol     Pt is taking eye drops     Continues on zocor 40mg daily for cholesterol     Lov, pt c/o shooting pain in ear  Lov, ordered gabapentin but pt did not take this because she found her sx were d/t a tooth problem and sx resolved       Lov, pt c/o a cough  Today pt says this cough returned 2 months ago x 9/2017  This is recurrent issue. Pt had said cough resolved [place] the mountains, returned when she came back  Gave prednisone, flonase, zyrtec in the past  This helped      Pt is taking flonase and claritin, not daily and now has dry cough   Discussed needing to take the flonase daily  Advised changing claritin to zyrtec or allegra  Reordered CXR--she never completed in past   If not improved, advised ENT eval    On exam, fluid and irritation to R ear  Ordered augmentin  Ordered medrol dosepack    Pt follows with Dr. Geovanni Graham (GI)   Last visit was 8/17  Used to take prevacid for heartburn  Pt is now taking protonix, works well  She has been taking dramamine prn and miralax BID to have nl BM  Reviewed COLO 2/18: nl  Reviewed EGD 2/18: nl     Recall she previously followed with Dr. Geovanni Graham about her stomach and episodes of diarrhea. Her stomach pain has resolved, continues on prevacid for this. Previously took amitiza prn but no longer on this, had hemorrhoid surgery. Doing well       Pt was referred to Dr. Delma Berry (cardio) by Dr. Geovanni Graham (GI)   Last visit was 11/3/17      ACP not on file. SDM is her  Albert Concepcion).       PREVENTIVE:  Colonoscopy: 2/18 with Dr. Geovanni Graham, 10 yr f/u  EGD: 2/28/18 with  Sandra Mate  AAA: [facility]  Pap: Dr. Raman Vela,   Mammogram: 17 negative, scheduled 7/10/18  DEXA: 16 osteopenia, scheduled   Tdap:   Pneumovax: 2017  Otiqopz61: 18   Shingles: declines  Flu shot: declines    A1c: 10/16 6.4,  6.1,  6.0,  6/0  Eye exam: Dr. Dara Holloway,   Hep C screen:  negative  Lipids:  LDL 90  EK/3/17, nsr with cardio    Patient Active Problem List    Diagnosis Date Noted    Essential hypertension with goal blood pressure less than 130/85 2016    Pure hypercholesterolemia 2016    Osteopenia 2012    Constipation 2011    Glaucoma 2011    Vertigo 2011    Headache(784.0) 2011    GERD - Esophagitis - reflux 2011     Current Outpatient Prescriptions   Medication Sig Dispense Refill    losartan-hydroCHLOROthiazide (HYZAAR) 100-25 mg per tablet TAKE 1 TABLET BY MOUTH EVERY DAY 90 Tab 0    pantoprazole (PROTONIX) 40 mg tablet Take 1 Tab by mouth daily. 60 Tab 3    loratadine (CLARITIN) 10 mg tablet Take 10 mg by mouth daily.  fluticasone (FLONASE ALLERGY RELIEF) 50 mcg/actuation nasal spray 2 Sprays by Both Nostrils route daily.  simvastatin (ZOCOR) 40 mg tablet TAKE 1 TABLET DAILY 90 Tab 1    latanoprost (XALATAN) 0.005 % ophthalmic solution Administer 1 Drop to both eyes nightly. 0    raNITIdine (ZANTAC) 150 mg tablet Take 150 mg by mouth two (2) times daily as needed.  cholecalciferol (VITAMIN D3) 1,000 unit tablet Take  by mouth daily.  CALCIUM CARBONATE/VITAMIN D3 (CALTRATE 600 + D PO) Take 1 Tab by mouth two (2) times a day.  albuterol (PROAIR HFA) 90 mcg/actuation inhaler Take 1 Puff by inhalation every four (4) hours as needed for Wheezing or Shortness of Breath. 1 Inhaler 1    estradiol (ESTRACE) 0.5 mg tablet Take  by mouth daily.  polyethylene glycol (MIRALAX) 17 gram packet Take 17 g by mouth daily.        Past Surgical History:   Procedure Laterality Date    BREAST SURGERY PROCEDURE UNLISTED      COLONOSCOPY N/A 2/28/2018    COLONOSCOPY performed by Philip Haddad MD at \[facility]\"" ENDOSCOPY    HX BREAST BIOPSY Left 1980s    benign cyst    HX GI      hemmorhoidectomy    HX HYSTERECTOMY      HX OTHER SURGICAL      \"hemrrhoid surgery\"      Lab Results  Component Value Date/Time   WBC 4.5 06/07/2018 10:56 AM   HGB 12.5 06/07/2018 10:56 AM   HCT 36.8 06/07/2018 10:56 AM   PLATELET 861 36/79/3299 10:56 AM   MCV 90 06/07/2018 10:56 AM     Lab Results  Component Value Date/Time   Cholesterol, total 138 06/07/2018 10:56 AM   HDL Cholesterol 62 06/07/2018 10:56 AM   LDL, calculated 62 06/07/2018 10:56 AM   Triglyceride 69 06/07/2018 10:56 AM     Lab Results  Component Value Date/Time   GFR est non-AA 71 06/07/2018 10:56 AM   GFR est AA 82 06/07/2018 10:56 AM   Creatinine 0.86 06/07/2018 10:56 AM   BUN 13 06/07/2018 10:56 AM   Sodium 141 06/07/2018 10:56 AM   Potassium 4.2 06/07/2018 10:56 AM   Chloride 100 06/07/2018 10:56 AM   CO2 25 06/07/2018 10:56 AM   Magnesium 1.9 09/03/2014 10:55 AM        Review of Systems   Respiratory: Positive for cough. Negative for shortness of breath. Cardiovascular: Negative for chest pain. Physical Exam   Constitutional: She is oriented to person, place, and time. She appears well-developed and well-nourished. No distress. HENT:   Head: Normocephalic and atraumatic. Right Ear: External ear normal.   Left Ear: External ear normal.   Mouth/Throat: Oropharynx is clear and moist. No oropharyngeal exudate. Irritation to R ear  Fluid behind R TM   Eyes: Conjunctivae and EOM are normal. Right eye exhibits no discharge. Left eye exhibits no discharge. Neck: Normal range of motion. Neck supple. Cardiovascular: Normal rate, regular rhythm, normal heart sounds and intact distal pulses. Exam reveals no gallop and no friction rub. No murmur heard. Pulmonary/Chest: Effort normal and breath sounds normal. No respiratory distress. She has no wheezes. She has no rales. She exhibits no tenderness. Musculoskeletal: Normal range of motion. She exhibits no edema, tenderness or deformity. Lymphadenopathy:     She has no cervical adenopathy. Neurological: She is alert and oriented to person, place, and time. Coordination normal.   Skin: Skin is warm and dry. No rash noted. She is not diaphoretic. No erythema. No pallor. Psychiatric: She has a normal mood and affect. Her behavior is normal.       ASSESSMENT and PLAN    ICD-10-CM ICD-9-CM    1. Essential hypertension with goal blood pressure less than 130/85    Well controlled on losartan-HCTZ, continue   I10 401.9    2. Pure hypercholesterolemia    Controlled on zocor in 6/18   E78.00 272.0    3. Gastroesophageal reflux disease without esophagitis    Now on protonix, doing well with this, had recent EGD   K21.9 530.81    4. IFG (impaired fasting glucose)    a1c stable, mildly elevated, continue with w/l   R73.01 790.21    5. Osteopenia of multiple sites    DEXA scheduled for 9/18, vit D for tx   M85.89 733.90    6. Cough    Check CXR, change claritin to zyr, star flonase daily    Pt has evidence of otitis on exam, will treat with augmentin and provided medrol dosepack to improve sx   R05 786.2 XR CHEST PA LAT      REFERRAL TO ENT-OTOLARYNGOLOGY     Depression screen reviewed and negative. Scribed by Maxi Pereira of [address] [place] [address] [address], as dictated by Dr. Adalid Drew. Current diagnosis and concerns discussed with pt at length. Pt understands risks and benefits or current treatment plan and medications, and accepts the treatment and medication with any possible risks. Pt asks appropriate questions, which were answered. Pt was instructed to call with any concerns or problems. This note will not be viewable in [address]

## 2018-07-10 ENCOUNTER — HOSPITAL ENCOUNTER (OUTPATIENT)
Dept: MAMMOGRAPHY | Age: 68
Discharge: HOME | End: 2018-07-10
Attending: INTERNAL MEDICINE
Payer: MEDICARE

## 2018-07-10 DIAGNOSIS — Z12.31 VISIT FOR SCREENING MAMMOGRAM: ICD-10-CM

## 2018-07-10 PROCEDURE — 77067 SCR MAMMO BI INCL CAD: CPT

## 2018-09-06 ENCOUNTER — HOSPITAL ENCOUNTER (OUTPATIENT)
Dept: BONE DENSITY | Age: 68
Discharge: HOME | End: 2018-09-06
Attending: INTERNAL MEDICINE
Payer: MEDICARE

## 2018-09-06 DIAGNOSIS — M89.9 BONE DISEASE: ICD-10-CM

## 2018-09-06 PROCEDURE — 77080 DXA BONE DENSITY AXIAL: CPT

## 2018-09-07 RX ORDER — LOSARTAN POTASSIUM AND HYDROCHLOROTHIAZIDE 25; 100 MG/1; MG/1
TABLET ORAL
Qty: 90 TAB | Refills: 0 | Status: SHIPPED | OUTPATIENT
Start: 2018-09-07 | End: 2018-12-04 | Stop reason: SDUPTHER

## 2018-10-01 ENCOUNTER — OFFICE VISIT (OUTPATIENT)
Dept: INTERNAL MEDICINE CLINIC | Age: 68
End: 2018-10-01

## 2018-10-01 VITALS — BODY MASS INDEX: 29.25 KG/M2 | HEIGHT: 66 IN | WEIGHT: 182 LBS

## 2018-10-01 DIAGNOSIS — K20.90 ESOPHAGITIS, UNSPECIFIED: Primary | ICD-10-CM

## 2018-10-01 DIAGNOSIS — R05.9 COUGH: ICD-10-CM

## 2018-10-01 DIAGNOSIS — I10 ESSENTIAL HYPERTENSION WITH GOAL BLOOD PRESSURE LESS THAN 130/85: ICD-10-CM

## 2018-10-01 RX ORDER — PANTOPRAZOLE SODIUM 40 MG/1
40 TABLET, DELAYED RELEASE ORAL 2 TIMES DAILY
Qty: 180 TAB | Refills: 1 | Status: SHIPPED | OUTPATIENT
Start: 2018-10-01 | End: 2019-03-29 | Stop reason: SDUPTHER

## 2018-10-01 RX ORDER — SUCRALFATE 1 G/1
1 TABLET ORAL 3 TIMES DAILY
Qty: 90 TAB | Refills: 0 | Status: SHIPPED | OUTPATIENT
Start: 2018-10-01 | End: 2018-10-01 | Stop reason: SDUPTHER

## 2018-10-01 RX ORDER — SUCRALFATE 1 G/1
TABLET ORAL
Qty: 270 TAB | Refills: 0 | Status: SHIPPED | OUTPATIENT
Start: 2018-10-01 | End: 2018-12-28 | Stop reason: SDUPTHER

## 2018-10-01 NOTE — PATIENT INSTRUCTIONS
Change protonix to before breakfast and before dinner    Can still take zantac in the evening    Zyrtec 10mg in the evening for cough

## 2018-10-01 NOTE — MR AVS SNAPSHOT
[address] [facility] 
[phone] Patient: Lu Baez MRN: [id number] Visit Information Date & Time Provider Department Dept. Phone Encounter #  
 10/1/2018  2:45 PM Karen Smalls, [address] [place] [address],[address] [phone] [id number] Follow-up Instructions Return if symptoms worsen or fail to improve, for as scheduled. Your Appointments 1/9/2019  1:45 PM  
ROUTINE CARE with Karen Smalls, [address] [facility] [address] [facility]) Appt Note: 6 month follow up  
 [facility] [address] [id number]  
[address] [facility] [address] [facility] Upcoming Health Maintenance Date Due Shingrix Vaccine Age 50> (1 of 2) 11/25/2002 GLAUCOMA SCREENING Q2Y 11/25/2017 Influenza Age 5 to Adult 7/22/2019* MEDICARE YEARLY EXAM 11/28/2018 BREAST CANCER SCRN MAMMOGRAM 7/10/2020 DTaP/Tdap/Td series (2 - Td) 1/1/2022 Pneumococcal 65+ Low/Medium Risk (2 of 2 - PPSV23) 2/6/2022 COLONOSCOPY 2/28/2025 *Topic was postponed. The date shown is not the original due date. Allergies as of 10/1/2018  Review Complete On: 7/9/2018 By: Karen Smalls MD  
 No Known Allergies Current Immunizations  Reviewed on 7/9/2018 Name Date Pneumococcal Conjugate (PCV-13) 7/9/2018 Pneumococcal Polysaccharide (PPSV-23) 2/6/2017 Tdap 1/1/2012 Not reviewed this visit You Were Diagnosed With   
  
 Codes Comments Esophagitis, unspecified    -  Primary ICD-10-CM: K20.9 ICD-9-CM: 530.10 Cough     ICD-10-CM: R05 ICD-9-CM: 786.2 Essential hypertension with goal blood pressure less than 130/85     ICD-10-CM: I10 
ICD-9-CM: 401.9 Vitals Height(growth percentile) Weight(growth percentile) BMI OB Status Smoking Status 5' 6\" (1.676 m) 182 lb (82.6 kg) 29.38 kg/m2 Hysterectomy Never Smoker BMI and BSA Data Body Mass Index Body Surface Area  
 29.38 kg/m 2 1.96 m 2 Preferred Pharmacy Pharmacy Name Phone [facility] [address]  [place] [phone] Your Updated Medication List  
  
   
This list is accurate as of 10/1/18  2:50 PM.  Always use your most recent med list.  
  
  
  
  
 albuterol 90 mcg/actuation inhaler Commonly known as:  PROAIR HFA Take 1 Puff by inhalation every four (4) hours as needed for Wheezing or Shortness of Breath. budesonide 90 mcg/actuation Aepb inhaler Commonly known as:  Shaji Cram Take 2 Puffs by inhalation two (2) times a day. CALTRATE 600 + D PO Take 1 Tab by mouth two (2) times a day. cholecalciferol 1,000 unit tablet Commonly known as:  VITAMIN D3 Take  by mouth daily. FLONASE ALLERGY RELIEF 50 mcg/actuation nasal spray Generic drug:  fluticasone 2 Sprays by Both Nostrils route daily. latanoprost 0.005 % ophthalmic solution Commonly known as:  Alberto Castle Administer 1 Drop to both eyes nightly. losartan-hydroCHLOROthiazide 100-25 mg per tablet Commonly known as:  HYZAAR  
TAKE 1 TABLET BY MOUTH EVERY DAY  
  
 pantoprazole 40 mg tablet Commonly known as:  PROTONIX Take 1 Tab by mouth two (2) times a day. polyethylene glycol 17 gram packet Commonly known as:  Ivette Harmon Take 17 g by mouth daily. simvastatin 40 mg tablet Commonly known as:  ZOCOR  
TAKE 1 TABLET DAILY  
  
 sucralfate 1 gram tablet Commonly known as:  Pia Suresh Take 1 Tab by mouth three (3) times daily. ZANTAC 150 mg tablet Generic drug:  raNITIdine Take 150 mg by mouth two (2) times daily as needed. Prescriptions Sent to Pharmacy  Refills  
 budesonide (PULMICORT FLEXHALER) 90 mcg/actuation aepb inhaler 1  
 Sig: Take 2 Puffs by inhalation two (2) times a day. Class: Normal  
 Pharmacy: [facility] [address] Ph #: [phone] Route: Inhalation  
 sucralfate (CARAFATE) 1 gram tablet 0 Sig: Take 1 Tab by mouth three (3) times daily. Class: Normal  
 Pharmacy: [address] Ph #: [phone] Route: Oral  
 pantoprazole (PROTONIX) 40 mg tablet 1 Sig: Take 1 Tab by mouth two (2) times a day. Class: Normal  
 Pharmacy: [facility] [address] Ph #: [phone] Route: Oral  
  
Follow-up Instructions Return if symptoms worsen or fail to improve, for as scheduled. Patient Instructions Change protonix to before breakfast and before dinner Can still take zantac in the evening Zyrtec 10mg in the evening for cough Introducing [facility]! [facility] introduces Shipu patient portal. Now you can access parts of your medical record, email your doctor's office, and request medication refills online. 1. In your internet browser, go to [email or web address] 2. Click on the First Time User? Click Here link in the Sign In box. You will see the New Member Sign Up page. 3. Enter your Shipu Access Code exactly as it appears below. You will not need to use this code after youve completed the sign-up process. If you do not sign up before the expiration date, you must request a new code. · Shipu Access Code: [email or web address] Expires: 12/30/2018  2:50 PM 
 
4. Enter the last four digits of your Social Security Number (xxxx) and Date of Birth (mm/dd/yyyy) as indicated and click Submit. You will be taken to the next sign-up page. 5. Create a Convrrtt ID.  This will be your Shipu login ID and cannot be changed, so think of one that is secure and easy to remember. 6. Create a Kidbox password. You can change your password at any time. 7. Enter your Password Reset Question and Answer. This can be used at a later time if you forget your password. 8. Enter your e-mail address. You will receive e-mail notification when new information is available in [address] 9. Click Sign Up. You can now view and download portions of your medical record. 10. Click the Download Summary menu link to download a portable copy of your medical information. If you have questions, please visit the Frequently Asked Questions section of the Kidbox website. Remember, Kidbox is NOT to be used for urgent needs. For medical emergencies, dial 911. Now available from your iPhone and Android! Please provide this summary of care documentation to your next provider. Your primary care clinician is listed as Karen Smalls. If you have any questions after today's visit, please call [phone].

## 2018-10-01 NOTE — PROGRESS NOTES
HISTORY OF PRESENT ILLNESS  Keya Magdaleno is a 74 y.o. female. HPI  Last here 7/9/18. Pt is here for acute/routine care.      BP is 113/74  BP at home around 120/70  Continues on losartan-HCTZ daily  Recall pt had a cough on lisinopril. She was taking this medication for 20 years.  Meg Dorantes is stable since last visit   Discussed diet and weight loss    Reviewed labs 6/18     Pt follows with Dr. Senait Shay (GI)   Last visit was 8/17  Used to take prevacid for heartburn  Pt is now taking protonix, works well  She has been taking dramamine prn and miralax BID to have nl BM  Recall she previously followed with Dr. Senait Shay about her stomach and episodes of diarrhea. Her stomach pain has resolved, continues on prevacid for this. Previously took amitiza prn but no longer on this, had hemorrhoid surgery.  Doing well       Pt was referred to Dr. Petros Vega (cardio) by Dr. Senait Shay (GI)   Last visit was 11/3/17    Pt is taking estradiol      Pt is taking eye drops      Continues on zocor 40mg daily for cholesterol     Pt c/o worsening heartburn  Pt takes zantac and protonix  She is taking 2 protonix at a time  Advised her to instead taking it to taking one before breakfast and before dinner  Pt also takes lizzie seltzer, which she states helps her chest discomfort/heart burn quite a lot  Reviewed stress test and ECHO in 11/2017 and 12/2017  Will give sucralfate to try    Lov, pt c/o a chronic recurrent cough x 9/2017  Gave prednisone, flonase, zyrtec in the past which helped  Today she states she is still having this cough  Pt went to the ENT early in the year, per pt he said that all was nl - will get notes for review  Reviewed CXR 7/18: nl  Pt is taking flonase and zyrtec - pt states that zyrtec is very expensive, so she sometimes does not have any  She also took medrol dosepack, which did not help at all  Previously her cough had improved with a trip to the mountains  Pt states that the cough is making her chest hurt  Sx occur all day long, and especially if she drinks something cold  Advised her to take zyrtec consistently  Will provide pulmicort      Reviewed DEXA : mild osteopenia    Reviewed mammo : nl    ACP not on file. SDM is her  Micheal Apodaca).     PREVENTIVE:  Colonoscopy:  with Dr. Ewa Serrato, 10 yr f/u  EGD: 18 with Dr. Ewa Serrato  AAA: [facility]  Pap: Dr. Janee Macias,   Mammogram: 7/10/18, negative  DEXA: 18, mild osteopenia  Tdap:   Pneumovax: 2017  Udgcprn91: 18   Shingles: declines  Flu shot: declines    A1c: 10/16 6.4,  6.1,  6.0,  6.0  Eye exam: Dr. Cecile Lawton,   Hep C screen:  negative  Lipids:  LDL 62  EK/3/17, nsr with cardio    Patient Active Problem List    Diagnosis Date Noted    Essential hypertension with goal blood pressure less than 130/85 2016    Pure hypercholesterolemia 2016    Osteopenia 2012    Constipation 2011    Glaucoma 2011    Vertigo 2011    Headache(784.0) 2011    GERD - Esophagitis - reflux 2011     Current Outpatient Prescriptions   Medication Sig Dispense Refill    losartan-hydroCHLOROthiazide (HYZAAR) 100-25 mg per tablet TAKE 1 TABLET BY MOUTH EVERY DAY 90 Tab 0    pantoprazole (PROTONIX) 40 mg tablet Take 1 Tab by mouth daily. 90 Tab 1    simvastatin (ZOCOR) 40 mg tablet TAKE 1 TABLET DAILY 90 Tab 1    methylPREDNISolone (MEDROL DOSEPACK) 4 mg tablet Per pack 1 Dose Pack 0    loratadine (CLARITIN) 10 mg tablet Take 10 mg by mouth daily.  fluticasone (FLONASE ALLERGY RELIEF) 50 mcg/actuation nasal spray 2 Sprays by Both Nostrils route daily.  albuterol (PROAIR HFA) 90 mcg/actuation inhaler Take 1 Puff by inhalation every four (4) hours as needed for Wheezing or Shortness of Breath. 1 Inhaler 1    latanoprost (XALATAN) 0.005 % ophthalmic solution Administer 1 Drop to both eyes nightly. 0    raNITIdine (ZANTAC) 150 mg tablet Take 150 mg by mouth two (2) times daily as needed.  cholecalciferol (VITAMIN D3) 1,000 unit tablet Take  by mouth daily.  CALCIUM CARBONATE/VITAMIN D3 (CALTRATE 600 + D PO) Take 1 Tab by mouth two (2) times a day.  polyethylene glycol (MIRALAX) 17 gram packet Take 17 g by mouth daily. Past Surgical History:   Procedure Laterality Date    BREAST SURGERY PROCEDURE UNLISTED      COLONOSCOPY N/A 2/28/2018    COLONOSCOPY performed by Ritu Smith MD at [facility] ENDOSCOPY    HX BREAST BIOPSY Left 1980s    benign cyst    HX GI      hemmorhoidectomy    HX HYSTERECTOMY      HX OTHER SURGICAL      \"hemrrhoid surgery\"      Lab Results  Component Value Date/Time   WBC 4.5 06/07/2018 10:56 AM   HGB 12.5 06/07/2018 10:56 AM   HCT 36.8 06/07/2018 10:56 AM   PLATELET 926 88/04/4423 10:56 AM   MCV 90 06/07/2018 10:56 AM     Lab Results  Component Value Date/Time   Cholesterol, total 138 06/07/2018 10:56 AM   HDL Cholesterol 62 06/07/2018 10:56 AM   LDL, calculated 62 06/07/2018 10:56 AM   Triglyceride 69 06/07/2018 10:56 AM     Lab Results  Component Value Date/Time   GFR est non-AA 71 06/07/2018 10:56 AM   GFR est AA 82 06/07/2018 10:56 AM   Creatinine 0.86 06/07/2018 10:56 AM   BUN 13 06/07/2018 10:56 AM   Sodium 141 06/07/2018 10:56 AM   Potassium 4.2 06/07/2018 10:56 AM   Chloride 100 06/07/2018 10:56 AM   CO2 25 06/07/2018 10:56 AM   Magnesium 1.9 09/03/2014 10:55 AM        Review of Systems   Respiratory: Positive for cough. Negative for shortness of breath. Cardiovascular: Negative for chest pain. Gastrointestinal: Positive for heartburn. Physical Exam   Constitutional: She is oriented to person, place, and time. She appears well-developed and well-nourished. No distress. HENT:   Head: Normocephalic and atraumatic. Right Ear: External ear normal.   Left Ear: External ear normal.   Mouth/Throat: Oropharynx is clear and moist. No oropharyngeal exudate. Eyes: Conjunctivae and EOM are normal. Right eye exhibits no discharge.  Left eye exhibits no discharge. Neck: Normal range of motion. Neck supple. Cardiovascular: Normal rate, regular rhythm, normal heart sounds and intact distal pulses. Exam reveals no gallop and no friction rub. No murmur heard. Pulmonary/Chest: Effort normal and breath sounds normal. No respiratory distress. She has no wheezes. She has no rales. She exhibits no tenderness. Musculoskeletal: Normal range of motion. She exhibits no edema, tenderness or deformity. Lymphadenopathy:     She has no cervical adenopathy. Neurological: She is alert and oriented to person, place, and time. Coordination normal.   Skin: Skin is warm and dry. No rash noted. She is not diaphoretic. No erythema. No pallor. Psychiatric: She has a normal mood and affect. Her behavior is normal.       ASSESSMENT and PLAN    ICD-10-CM ICD-9-CM    1. GERD - Esophagitis - reflux    Separate protonix to before breakfast and before dinner, give trial sucralfate, had EGD completed last February with Dr Alexi Robins   K20.9 530.10    2. Cough    Pt with chronic cough for more than a year, has seen ENT and GI in the past, has some atypical chest pain associated with either her cough or her GERD which was also evaluated by cardio, advised her to get back on her zyrtec and flonase, will give trial pulmicort to see if this improves her cough to see if it is asthmatic in nature   R05 786.2    3. Essential hypertension with goal blood pressure less than 130/85    Well controlled on losartan HCTZ   I10 401.9         Scribed by Chuy Gomez of [address] [place] [address] [address], as dictated by Dr. Ana Das. Current diagnosis and concerns discussed with pt at length. Pt understands risks and benefits or current treatment plan and medications, and accepts the treatment and medication with any possible risks. Pt asks appropriate questions, which were answered. Pt was instructed to call with any concerns or problems. This note will not be viewable in [address]

## 2018-11-05 ENCOUNTER — TELEPHONE (OUTPATIENT)
Dept: INTERNAL MEDICINE CLINIC | Age: 68
End: 2018-11-05

## 2018-11-05 RX ORDER — FLUTICASONE PROPIONATE 110 UG/1
1 AEROSOL, METERED RESPIRATORY (INHALATION) EVERY 12 HOURS
Qty: 1 INHALER | Refills: 0 | Status: SHIPPED | OUTPATIENT
Start: 2018-11-05 | End: 2020-02-12

## 2018-11-05 NOTE — TELEPHONE ENCOUNTER
flovent ordered instead  May not be cheaper as no inhalers are covered well    Suggest pulmonary eval next--dr romeo office

## 2018-11-05 NOTE — TELEPHONE ENCOUNTER
Pt is requesting a sample inhaler, due to insurance not covering the alternative ones recommended.  Best contact [phone]) [phone]      Copy/paste Mely

## 2018-11-05 NOTE — TELEPHONE ENCOUNTER
Called, spoke to pt. Two identifiers confirmed. Pt stated that the inhaler Pulmicort is not covered by her insurance. Pt stated she did ask her insurance company what inhalers are covered by her plan and they stated no inhaler is covered. Pt is now on Medicare. Pt stated she still has a bad cough and is going on a cruise at the end of the month and would like to better by then. Notified pt that I would message Dr. Yi Baer and call her back with Dr. Romain Stokes recommendations. No further questions at this time.

## 2018-11-06 NOTE — TELEPHONE ENCOUNTER
Called, spoke to pt. Two identifiers confirmed. Notified pt of Dr. Maggi Graham recommendations. Pt stated she will see how the new inhaler does and if she is still not getting any better she would like the referral to pulm. Pt notified to call the office if she would like a referral.   Pt verbalized understanding of information discussed w/ no further questions at this time.

## 2018-11-06 NOTE — TELEPHONE ENCOUNTER
----- Message from Juan Rg sent at 11/6/2018  2:40 PM EST -----  Regarding: Dr. Lynn Murphy requesting a call regarding Rx \"Albuterol\" inhaler that the pt insurance will not cover. Pt would like to discuss a cheaper inhaler. [address] (on file) [phone]. Best contact [phone].         Message copied/pasted from [facility]

## 2018-11-23 RX ORDER — ALBUTEROL SULFATE 90 UG/1
1 AEROSOL, METERED RESPIRATORY (INHALATION)
Qty: 1 INHALER | Refills: 1 | Status: SHIPPED | OUTPATIENT
Start: 2018-11-23 | End: 2022-06-06

## 2018-11-23 NOTE — TELEPHONE ENCOUNTER
PCP: Mirella Leija MD    Last appt: 10/1/2018  Future Appointments   Date Time Provider Chanda Macahdo   1/9/2019  1:45 PM Mirella Leija MD [facility]       Requested Prescriptions     Pending Prescriptions Disp Refills    albuterol (PROAIR HFA) 90 mcg/actuation inhaler 1 Inhaler 1     Sig: Take 1 Puff by inhalation every four (4) hours as needed for Wheezing or Shortness of Breath.

## 2018-12-04 RX ORDER — LOSARTAN POTASSIUM AND HYDROCHLOROTHIAZIDE 25; 100 MG/1; MG/1
TABLET ORAL
Qty: 90 TAB | Refills: 0 | Status: SHIPPED | OUTPATIENT
Start: 2018-12-04 | End: 2019-02-26 | Stop reason: SDUPTHER

## 2018-12-30 RX ORDER — SUCRALFATE 1 G/1
TABLET ORAL
Qty: 270 TAB | Refills: 0 | Status: SHIPPED | OUTPATIENT
Start: 2018-12-30 | End: 2019-01-09

## 2019-01-07 ENCOUNTER — TELEPHONE (OUTPATIENT)
Dept: INTERNAL MEDICINE CLINIC | Age: 69
End: 2019-01-07

## 2019-01-07 NOTE — TELEPHONE ENCOUNTER
MD Catherine Li LPN   Caller: Unspecified (Today, 11:11 AM)             We can send her to pulmonary for cough

## 2019-01-07 NOTE — TELEPHONE ENCOUNTER
Patient calling to see if she can get a referral to see a specialist for the cough instead of coming to see Dr Abel Baires on Thursday. Please call patient.

## 2019-01-07 NOTE — TELEPHONE ENCOUNTER
Called, spoke to pt. Two pt identifiers confirmed. Pt informed per Dr. Tray Almazan that she will refer her to pulmonology. Pt confirmed her appt on 1/9/18 at 1345. Pt verbalized understanding of information discussed w/ no further questions at this time.

## 2019-01-08 ENCOUNTER — DOCUMENTATION ONLY (OUTPATIENT)
Dept: INTERNAL MEDICINE CLINIC | Age: 69
End: 2019-01-08

## 2019-01-08 NOTE — PROGRESS NOTES
Medicare Part B Preventive Services Guidelines/Limitations Date last completed and Frequency Due Date   Bone Mass Measurement  (age 72 & older, biennial) Requires diagnosis related to osteoporosis or estrogen deficiency. Biennial benefit unless patient has history of long-term glucocorticoid tx or baseline is needed because initial test was by other method Completed 9/2018    Recommended every 2 years Due 9/2020   Cardiovascular Screening Blood Tests (every 5 years)  Total cholesterol, HDL, Triglycerides Order as a panel if possible Completed 6/2018    Recommended annually Due 6/2019   Colorectal Cancer Screening  -Fecal occult blood test (annual)  -Flexible sigmoidoscopy (5y)  -Screening colonoscopy (10y)  -Barium Enema   Completed 2/2018 with Dr Jamey Gleason     Recommended repeat in 10 years  Due 2028     Counseling to Prevent Tobacco Use (up to 8 sessions per year)  - Counseling greater than 3 and up to 10 minutes  - Counseling greater than 10 minutes Patients must be asymptomatic of tobacco-related conditions to receive as preventive service n/a n/a   Diabetes Screening Tests (at least every 3 years, Medicare covers annually or at 6-month intervals for prediabetic patients)     Fasting blood sugar (FBS) or glucose tolerance test (GTT) Patient must be diagnosed with one of the following:  -Hypertension, Dyslipidemia, obesity, previous impaired FBS or GTT  Or any two of the following: overweight, FH of diabetes, age ? 72, history of gestational diabetes, birth of baby weighing more than 9 pounds Completed 6/2018 with A1C 6.0        Recommended every 3-6 months for Pre-Diabetics and Diabetics    Due now   Diabetes Self-Management Training (DSMT) (no USPSTF recommendation) Requires referral by treating physician for patient with diabetes or renal disease. 10 hours of initial DSMT session of no less than 30 minutes each in a continuous 12-month period. 2 hours of follow-up DSMT in subsequent years.  n/a n/a   Glaucoma Screening (no USPSTF recommendation) Diabetes mellitus, family history, , age 48 or over,  American, age 72 or over Completed 9/2017    Recommended annually Due now   Human Immunodeficiency Virus (HIV) Screening (annually for increased risk patients)  HIV-1 and HIV-2 by EIA, NICKO, rapid antibody test, or oral mucosa transudate Patient must be at increased risk for HIV infection per USPSTF guidelines or pregnant. Tests covered annually for patients at increased risk. Pregnant patients may receive up to 3 test during pregnancy. n/a n/a   Medical Nutrition Therapy (MNT) (for diabetes or renal disease not recommended schedule) Requires referral by treating physician for patient with diabetes or renal disease. Can be provided in same year as diabetes self-management training (DSMT), and CMS recommends medical nutrition therapy take place after DSMT. Up to 3 hours for initial year and 2 hours in subsequent years. n/a n/a         Seasonal Influenza Vaccination (annually)   Completed   Recommended Annually You decline   Pneumococcal Vaccination (once after 65)   Pneumococcal 23 -2/6/17  Recommended once over the age of 72     Prevnar 15 - 7/9/18 Recommended once over the age of 72 Complete             Complete   Hepatitis B Vaccinations (if medium/high risk) Medium/high risk factors:  End-stage renal disease,  Hemophiliacs who received Factor VIII or IX concentrates, Clients of institutions for the mentally retarded, Persons who live in the same house as a HepB virus carrier, Homosexual men, Illicit injectable drug abusers. n/a n/a   Screening Mammography (biennial age 54-69)?  Annually (age [address] or over) Completed 7/2018    Recommended annually    Due 7/2019   Screening Pap Tests and Pelvic Examination (up to age 79 and after 79 if unknown history or abnormal study last 10 years) Every 24 months except high risk Completed 4/2017    Recommended every other year Due 4/2019   Ultrasound Screening for Abdominal Aortic Aneurysm (AAA) (once) Patient must be referred through IPPE and not have had a screening for abdominal aortic aneurysm before under Medicare.   Limited to patients who meet one of the following criteria:  - Men who are 73-68 years old and have smoked more than 100 cigarettes in their lifetime.  -Anyone with a FH of AAA  -Anyone recommended for screening by USPSTF n/a n/a

## 2019-01-09 ENCOUNTER — HOSPITAL ENCOUNTER (OUTPATIENT)
Dept: LAB | Age: 69
Discharge: HOME | End: 2019-01-09
Payer: MEDICARE

## 2019-01-09 ENCOUNTER — OFFICE VISIT (OUTPATIENT)
Dept: INTERNAL MEDICINE CLINIC | Age: 69
End: 2019-01-09

## 2019-01-09 VITALS
HEIGHT: 66 IN | OXYGEN SATURATION: 98 % | SYSTOLIC BLOOD PRESSURE: 121 MMHG | RESPIRATION RATE: 16 BRPM | TEMPERATURE: 97.6 F | WEIGHT: 191 LBS | DIASTOLIC BLOOD PRESSURE: 73 MMHG | HEART RATE: 78 BPM | BODY MASS INDEX: 30.7 KG/M2

## 2019-01-09 DIAGNOSIS — I10 ESSENTIAL HYPERTENSION WITH GOAL BLOOD PRESSURE LESS THAN 130/85: Primary | ICD-10-CM

## 2019-01-09 DIAGNOSIS — K20.90 ESOPHAGITIS, UNSPECIFIED: ICD-10-CM

## 2019-01-09 DIAGNOSIS — R73.01 IFG (IMPAIRED FASTING GLUCOSE): ICD-10-CM

## 2019-01-09 DIAGNOSIS — R05.3 CHRONIC COUGH: ICD-10-CM

## 2019-01-09 DIAGNOSIS — E78.00 PURE HYPERCHOLESTEROLEMIA: ICD-10-CM

## 2019-01-09 DIAGNOSIS — Z00.00 MEDICARE ANNUAL WELLNESS VISIT, SUBSEQUENT: ICD-10-CM

## 2019-01-09 DIAGNOSIS — M85.89 OSTEOPENIA OF MULTIPLE SITES: ICD-10-CM

## 2019-01-09 PROCEDURE — 36415 COLL VENOUS BLD VENIPUNCTURE: CPT

## 2019-01-09 PROCEDURE — 83036 HEMOGLOBIN GLYCOSYLATED A1C: CPT

## 2019-01-09 PROCEDURE — 80048 BASIC METABOLIC PNL TOTAL CA: CPT

## 2019-01-09 NOTE — PROGRESS NOTES
This is the Subsequent Medicare Annual Wellness Exam, performed 12 months or more after the Initial AWV or the last Subsequent AWV    I have reviewed the patient's medical history in detail and updated the computerized patient record. History     Past Medical History:   Diagnosis Date    Arthritis     narrowing of spine    Environmental and seasonal allergies     GERD - Esophagitis - reflux 3/22/2011    Headache(784.0) 6/27/2011    Hypercholesterolemia 3/22/2011    Hypertension 3/22/2011    Other ill-defined conditions(799.89)     high cholesterol, vertigo, migraine    Ulcer       Past Surgical History:   Procedure Laterality Date    BREAST SURGERY PROCEDURE UNLISTED      COLONOSCOPY N/A 2/28/2018    COLONOSCOPY performed by Jacqui Harper [facility] at [facility] ENDOSCOPY    HX BREAST BIOPSY Left 1980s    benign cyst    HX GI      hemmorhoidectomy    HX HYSTERECTOMY      HX OTHER SURGICAL      \"hemrrhoid surgery\"     Current Outpatient Medications   Medication Sig Dispense Refill    losartan-hydroCHLOROthiazide (HYZAAR) 100-25 mg per tablet TAKE 1 TABLET BY MOUTH EVERY DAY 90 Tab 0    pantoprazole (PROTONIX) 40 mg tablet Take 1 Tab by mouth two (2) times a day. 180 Tab 1    simvastatin (ZOCOR) 40 mg tablet TAKE 1 TABLET DAILY 90 Tab 1    latanoprost (XALATAN) 0.005 % ophthalmic solution Administer 1 Drop to both eyes nightly. 0    raNITIdine (ZANTAC) 150 mg tablet Take 150 mg by mouth two (2) times daily as needed.  sucralfate (CARAFATE) 1 gram tablet TAKE 1 TABLET BY MOUTH THREE TIMES DAILY 270 Tab 0    albuterol (PROAIR HFA) 90 mcg/actuation inhaler Take 1 Puff by inhalation every four (4) hours as needed for Wheezing or Shortness of Breath. 1 Inhaler 1    fluticasone (FLOVENT HFA) 110 mcg/actuation inhaler Take 1 Puff by inhalation every twelve (12) hours. 1 Inhaler 0    budesonide (PULMICORT FLEXHALER) 90 mcg/actuation aepb inhaler Take 2 Puffs by inhalation two (2) times a day.  1 Inhaler 1    fluticasone (FLONASE ALLERGY RELIEF) 50 mcg/actuation nasal spray 2 Sprays by Both Nostrils route daily.  cholecalciferol (VITAMIN D3) 1,000 unit tablet Take  by mouth daily.  CALCIUM CARBONATE/VITAMIN D3 (CALTRATE 600 + D PO) Take 1 Tab by mouth two (2) times a day.  polyethylene glycol (MIRALAX) 17 gram packet Take 17 g by mouth daily. No Known Allergies  Family History   Problem Relation Age of Onset    Hypertension Mother     Hypertension Father      Social History     Tobacco Use    Smoking status: Never Smoker    Smokeless tobacco: Never Used   Substance Use Topics    Alcohol use: No     Patient Active Problem List   Diagnosis Code    GERD - Esophagitis - reflux K20.9    Headache(784.0) R51    Constipation K59.00    Glaucoma H40.9    Vertigo R42    Osteopenia M85.80    Essential hypertension with goal blood pressure less than 130/85 I10    Pure hypercholesterolemia E78.00       Depression Risk Factor Screening:     PHQ over the last two weeks 1/9/2019   Little interest or pleasure in doing things Not at all   Feeling down, depressed, irritable, or hopeless Not at all   Total Score PHQ 2 0     Alcohol Risk Factor Screening: You do not drink alcohol or very rarely. Functional Ability and Level of Safety:   Hearing Loss  Hearing is good. Activities of Daily Living  The home contains: no safety equipment. Patient does total self care    Fall Risk  Fall Risk Assessment, last 12 mths 1/9/2019   Able to walk? Yes   Fall in past 12 months?  No       Abuse Screen  Patient is not abused  Lives with , safe  Cognitive Screening   Evaluation of Cognitive Function:  Has your family/caregiver stated any concerns about your memory: no  Normal    Patient Care Team   Patient Care Team:  Mayuri Stephens MD as PCP - General (Internal Medicine)  Cole Manning MD as Physician (Cardiology)  Natasha Garcia MD (Gastroenterology)  Anastasia Johnson MD (Ophthalmology)  Georgina Chopra MD (Obstetrics & Gynecology)   updated    Assessment/Plan   Education and counseling provided:  Are appropriate based on today's review and evaluation  End-of-Life planning (with patient's consent)  Screening Pap and pelvic (covered once every 2 years)  Cardiovascular screening blood test  Screening for glaucoma  Diabetes screening test    Diagnoses and all orders for this visit:    1. Medicare annual wellness visit, subsequent        Health Maintenance Due   Topic Date Due    Shingrix Vaccine Age 49> (1 of 2) 2002    GLAUCOMA SCREENING Q2Y  2017    MEDICARE YEARLY EXAM  2018     Discussed with patient about advance medical directive. Provided patient blank AMD and Your Right to Decide Booklet. Requested that if completed to provide a copy of AMD to office. ACP not on file. SDM is her  Jackson Licea). Provided this info today.        Colonoscopy:  with Dr. Vane Riley, 10 yr f/u    AAA: [facility] AAA  Pap: Dr. Eliz Wilhelm,   Mammogram: 7/10/18, negative  DEXA: 18, mild osteopenia    Tdap:   Pneumovax: 2017  [id number]: 18   Shingles: declines  Flu shot: declines      Eye exam: Dr. Vernie Klinefelter, , due    A1c:   6.0 due    Hep C screen:  negative  Lipids:  LDL 62 annual     EK/3/17, nsr with cardio    Medication reconciliation completed by MA and reviewed by me. Medical/surgical/social/family history reviewed and updated by me. Patient provided AVS and preventative screening table. Patient verbalized understanding of all information discussed.

## 2019-01-09 NOTE — PATIENT INSTRUCTIONS
Medicare Part B Preventive Services Guidelines/Limitations Date last completed and Frequency Due Date   Bone Mass Measurement  (age 72 & older, biennial) Requires diagnosis related to osteoporosis or estrogen deficiency. Biennial benefit unless patient has history of long-term glucocorticoid tx or baseline is needed because initial test was by other method Completed 9/2018     Recommended every 2 years Due 9/2020   Cardiovascular Screening Blood Tests (every 5 years)  Total cholesterol, HDL, Triglycerides Order as a panel if possible Completed 6/2018     Recommended annually Due 6/2019   Colorectal Cancer Screening  -Fecal occult blood test (annual)  -Flexible sigmoidoscopy (5y)  -Screening colonoscopy (10y)  -Barium Enema   Completed 2/2018 with Dr Sana Heaton      Recommended repeat in 10 years  [facility] [id number]     Counseling to Prevent Tobacco Use (up to 8 sessions per year)  - Counseling greater than 3 and up to 10 minutes  - Counseling greater than 10 minutes Patients must be asymptomatic of tobacco-related conditions to receive as preventive service n/a n/a   Diabetes Screening Tests (at least every 3 years, Medicare covers annually or at 6-month intervals for prediabetic patients)     Fasting blood sugar (FBS) or glucose tolerance test (GTT) Patient must be diagnosed with one of the following:  -Hypertension, Dyslipidemia, obesity, previous impaired FBS or GTT  Or any two of the following: overweight, FH of diabetes, age ? 72, history of gestational diabetes, birth of baby weighing more than 9 pounds Completed 6/2018 with A1C 6.0        Recommended every 3-6 months for Pre-Diabetics and Diabetics    Due now   Diabetes Self-Management Training (DSMT) (no USPSTF recommendation) Requires referral by treating physician for patient with diabetes or renal disease. 10 hours of initial DSMT session of no less than 30 minutes each in a continuous 12-month period.  2 hours of follow-up DSMT in subsequent years.  n/a n/a   Glaucoma Screening (no USPSTF recommendation) Diabetes mellitus, family history, , age 48 or over,  American, age 72 or over Completed 9/2017     Recommended annually Due now   Human Immunodeficiency Virus (HIV) Screening (annually for increased risk patients)  HIV-1 and HIV-2 by EIA, NICKO, rapid antibody test, or oral mucosa transudate Patient must be at increased risk for HIV infection per USPSTF guidelines or pregnant.  Tests covered annually for patients at increased risk.  Pregnant patients may receive up to 3 test during pregnancy. n/a n/a   Medical Nutrition Therapy (MNT) (for diabetes or renal disease not recommended schedule) Requires referral by treating physician for patient with diabetes or renal disease.  Can be provided in same year as diabetes self-management training (DSMT), and CMS recommends medical nutrition therapy take place after DSMT. Giovana Schuster to 3 hours for initial year and 2 hours in subsequent years. n/a n/a             Seasonal Influenza Vaccination (annually)   Completed   Recommended Annually You decline   Pneumococcal Vaccination (once after 65)   Pneumococcal 23 -2/6/17  Recommended once over the age of 72     Prevnar 15 - 7/9/18 Recommended once over the age of 72 Complete              Complete   Hepatitis B Vaccinations (if medium/high risk) Medium/high risk factors:  End-stage renal disease,  Hemophiliacs who received Factor VIII or IX concentrates, Clients of institutions for the mentally retarded, Persons who live in the same house as a HepB virus carrier, Homosexual men, Illicit injectable drug abusers. n/a n/a   Screening Mammography (biennial age 54-69)?  Annually (age 36 or over) Completed 7/2018     Recommended annually    Due 7/2019   Screening Pap Tests and Pelvic Examination (up to age 79 and after 79 if unknown history or abnormal study last 10 years) Every 24 months except high risk Completed 4/2017     Recommended every other year Due 4/2019   Ultrasound Screening for Abdominal Aortic Aneurysm (AAA) (once) Patient must be referred through IPPE and not have had a screening for abdominal aortic aneurysm before under Medicare.  Limited to patients who meet one of the following criteria:  - Men who are 73-68 years old and have smoked more than 100 cigarettes in their lifetime.  -Anyone with a FH of AAA  -Anyone recommended for screening by USPSTF n/a n/a           Medicare Wellness Visit, Female     The best way to live healthy is to have a lifestyle where you eat a well-balanced diet, exercise regularly, limit alcohol use, and quit all forms of tobacco/nicotine, if applicable. Regular preventive services are another way to keep healthy. Preventive services (vaccines, screening tests, monitoring & exams) can help personalize your care plan, which helps you manage your own care. Screening tests can find health problems at the earliest stages, when they are easiest to treat. [facility] follows the current, evidence-based guidelines published by the [facility] [employer] (USPSTF) when recommending preventive services for our patients. Because we follow these guidelines, sometimes recommendations change over time as research supports it. (For example, mammograms used to be recommended annually. Even though Medicare will still pay for an annual mammogram, the newer guidelines recommend a mammogram every two years for women of average risk.)  Of course, you and your doctor may decide to screen more often for some diseases, based on your risk and your health status. Preventive services for you include:  - Medicare offers their members a free annual wellness visit, which is time for you and your primary care provider to discuss and plan for your preventive service needs. Take advantage of this benefit every year!  -All adults over the age of 72 should receive the recommended pneumonia vaccines.  Current USPSTF guidelines recommend a series of two vaccines for the best pneumonia protection.   -All adults should have a flu vaccine yearly and a tetanus vaccine every 10 years. All adults age 61 and older should receive a shingles vaccine once in their lifetime.    -A bone mass density test is recommended when a woman turns 65 to screen for osteoporosis. This test is only recommended one time, as a screening. Some providers will use this same test as a disease monitoring tool if you already have osteoporosis. -All adults age 38-68 who are overweight should have a diabetes screening test once every three years.   -Other screening tests and preventive services for persons with diabetes include: an eye exam to screen for diabetic retinopathy, a kidney function test, a foot exam, and stricter control over your cholesterol.   -Cardiovascular screening for adults with routine risk involves an electrocardiogram (ECG) at intervals determined by your doctor.   -Colorectal cancer screenings should be done for adults age 54-65 with no increased risk factors for colorectal cancer. There are a number of acceptable methods of screening for this type of cancer. Each test has its own benefits and drawbacks. Discuss with your doctor what is most appropriate for you during your annual wellness visit. The different tests include: colonoscopy (considered the best screening method), a fecal occult blood test, a fecal DNA test, and sigmoidoscopy. -Breast cancer screenings are recommended every other year for women of normal risk, age 54-69.  -Cervical cancer screenings for women over age 72 are only recommended with certain risk factors.   -All adults born between [facility] should be screened once for Hepatitis C.      Here is a list of your current Health Maintenance items (your personalized list of preventive services) with a due date:  Health Maintenance Due   Topic Date Due    Shingles Vaccine (1 of 2) 11/25/2002    Glaucoma Screening   11/25/2017    Annual Well Visit 11/28/2018         Medicare Wellness Visit, Female     The best way to live healthy is to have a lifestyle where you eat a well-balanced diet, exercise regularly, limit alcohol use, and quit all forms of tobacco/nicotine, if applicable. Regular preventive services are another way to keep healthy. Preventive services (vaccines, screening tests, monitoring & exams) can help personalize your care plan, which helps you manage your own care. Screening tests can find health problems at the earliest stages, when they are easiest to treat. [facility] follows the current, evidence-based guidelines published by the [facility] [employer] (USPSTF) when recommending preventive services for our patients. Because we follow these guidelines, sometimes recommendations change over time as research supports it. (For example, mammograms used to be recommended annually. Even though Medicare will still pay for an annual mammogram, the newer guidelines recommend a mammogram every two years for women of average risk.)  Of course, you and your doctor may decide to screen more often for some diseases, based on your risk and your health status. Preventive services for you include:  - Medicare offers their members a free annual wellness visit, which is time for you and your primary care provider to discuss and plan for your preventive service needs. Take advantage of this benefit every year!  -All adults over the age of 72 should receive the recommended pneumonia vaccines. Current USPSTF guidelines recommend a series of two vaccines for the best pneumonia protection.   -All adults should have a flu vaccine yearly and a tetanus vaccine every 10 years. All adults age 61 and older should receive a shingles vaccine once in their lifetime.    -A bone mass density test is recommended when a woman turns 65 to screen for osteoporosis. This test is only recommended one time, as a screening.  Some providers will use this same test as a disease monitoring tool if you already have osteoporosis. -All adults age 38-68 who are overweight should have a diabetes screening test once every three years.   -Other screening tests and preventive services for persons with diabetes include: an eye exam to screen for diabetic retinopathy, a kidney function test, a foot exam, and stricter control over your cholesterol.   -Cardiovascular screening for adults with routine risk involves an electrocardiogram (ECG) at intervals determined by your doctor.   -Colorectal cancer screenings should be done for adults age 54-65 with no increased risk factors for colorectal cancer. There are a number of acceptable methods of screening for this type of cancer. Each test has its own benefits and drawbacks. Discuss with your doctor what is most appropriate for you during your annual wellness visit. The different tests include: colonoscopy (considered the best screening method), a fecal occult blood test, a fecal DNA test, and sigmoidoscopy. -Breast cancer screenings are recommended every other year for women of normal risk, age 54-69.  -Cervical cancer screenings for women over age 72 are only recommended with certain risk factors.   -All adults born between [facility] should be screened once for Hepatitis C.      Here is a list of your current Health Maintenance items (your personalized list of preventive services) with a due date:  Health Maintenance Due   Topic Date Due    Shingles Vaccine (1 of 2) 11/25/2002    Glaucoma Screening   11/25/2017    Annual Well Visit  11/28/2018       START FLONASE NASAL SPRAY DAILY

## 2019-01-09 NOTE — PROGRESS NOTES
HISTORY OF PRESENT ILLNESS  Susanne Clemens is a 79 y.o. female. HPI   Last here 10/1/18. Pt is here for routine care.      BP is 150/79, will repeat this today--improved  Continues on losartan-HCTZ 100-25mg daily  She took this med this AM  Recall pt had a cough on lisinopril. She was taking this medication for 20 years.  No Carreon today is 191 lbs --up 9 lbs x lov   Pt has not been exercising much  She also went on a cruise recently  Discussed [employer]  Discussed monitoring calories and reducing by 200-500 calories daily  Discussed diet and weight loss    Reviewed labs 6/18  Will get labs today     Pt follows with Dr. Kristi Watson (GI)   Last visit was 8/17  Lov, pt c/o worsening heartburn  Lov, provided sucralfate - this did not help and she stopped taking it  Heartburn has improved  Pt takes protonix BID helps gerd/reflux  Discussed trying to decrease this if possible  She has been taking dramamine prn and miralax BID to have nl BM  Recall she previously followed with Dr. Kristi Watson about her stomach and episodes of diarrhea. Her stomach pain has resolved, continues on prevacid for this. Previously took amitiza prn but no longer on this, had hemorrhoid surgery.  Doing well       Pt was referred to Dr. Kristin Calhoun (cardio) by Dr. Kristi Watson (GI)   Last visit was 11/3/17     Pt is taking estradiol      Pt is taking eye drops      Continues on zocor 40mg daily for cholesterol      Lov, pt c/o a chronic recurrent cough x 9/2017  She still has this, which she says is sometimes triggered by things such as smelling perfumes  Gave prednisone, flonase, zyrtec in the past  Pt went to the ENT early in the year, per pt he said that all was nl - will get notes for review  Pt is taking zyrtec qhs  Lov, ordered pulmicort but this was too expensive  She used inhaler in the past and it helped, it is just not covered currently  Pt has not been using flonase recently, advised her to start using this daily  Will provide inhaler sample    Pt has been having a toothache lately  She has a dental appointment scheduled    Lives with her , this is a safe environment    Fully functional independently    No memory concerns     ACP not on file. SDM is her  Natalie Ames). Provided this info today.      PREVENTIVE:  Colonoscopy:  with Dr. Urbano Hsu, 10 yr f/u  EGD: 18 with Dr. Urbano Hsu  AAA: [facility] AAA  Pap: Dr. Deshaun Ruelas,   Mammogram: 7/10/18, negative  DEXA: 18, mild osteopenia  Tdap:   Pneumovax: 2017  Amccwhb96: 18   Shingles: declines  Flu shot: declines    A1c: 10/16 6.4,  6.1,  6.0,  6.0  Eye exam: Dr. Marizol Napier, , due  Hep C screen:  negative  Lipids:  LDL 62  EK/3/17, nsr with cardio    Patient Active Problem List    Diagnosis Date Noted    Essential hypertension with goal blood pressure less than 130/85 2016    Pure hypercholesterolemia 2016    Osteopenia 2012    Constipation 2011    Glaucoma 2011    Vertigo 2011    Headache(784.0) 2011    GERD - Esophagitis - reflux 2011     Current Outpatient Medications   Medication Sig Dispense Refill    sucralfate (CARAFATE) 1 gram tablet TAKE 1 TABLET BY MOUTH THREE TIMES DAILY 270 Tab 0    losartan-hydroCHLOROthiazide (HYZAAR) 100-25 mg per tablet TAKE 1 TABLET BY MOUTH EVERY DAY 90 Tab 0    albuterol (PROAIR HFA) 90 mcg/actuation inhaler Take 1 Puff by inhalation every four (4) hours as needed for Wheezing or Shortness of Breath. 1 Inhaler 1    fluticasone (FLOVENT HFA) 110 mcg/actuation inhaler Take 1 Puff by inhalation every twelve (12) hours. 1 Inhaler 0    budesonide (PULMICORT FLEXHALER) 90 mcg/actuation aepb inhaler Take 2 Puffs by inhalation two (2) times a day. 1 Inhaler 1    pantoprazole (PROTONIX) 40 mg tablet Take 1 Tab by mouth two (2) times a day.  180 Tab 1    simvastatin (ZOCOR) 40 mg tablet TAKE 1 TABLET DAILY 90 Tab 1    fluticasone (FLONASE ALLERGY RELIEF) 50 mcg/actuation nasal spray 2 Sprays by Both Nostrils route daily.  latanoprost (XALATAN) 0.005 % ophthalmic solution Administer 1 Drop to both eyes nightly. 0    raNITIdine (ZANTAC) 150 mg tablet Take 150 mg by mouth two (2) times daily as needed.  cholecalciferol (VITAMIN D3) 1,000 unit tablet Take  by mouth daily.  CALCIUM CARBONATE/VITAMIN D3 (CALTRATE 600 + D PO) Take 1 Tab by mouth two (2) times a day.  polyethylene glycol (MIRALAX) 17 gram packet Take 17 g by mouth daily. Past Surgical History:   Procedure Laterality Date    BREAST SURGERY PROCEDURE UNLISTED      COLONOSCOPY N/A 2/28/2018    [facility] performed [facility] Hugh [facility] at [facility] ENDOSCOPY    HX BREAST BIOPSY Left 1980s    benign cyst    HX GI      hemmorhoidectomy    HX HYSTERECTOMY      HX OTHER SURGICAL      \"hemrrhoid surgery\"      Lab Results   Component Value Date/Time    WBC 4.5 06/07/2018 10:56 AM    HGB 12.5 06/07/2018 10:56 AM    HCT 36.8 06/07/2018 10:56 AM    PLATELET 497 42/20/2955 10:56 AM    MCV 90 06/07/2018 10:56 AM     Lab Results   Component Value Date/Time    Cholesterol, total 138 06/07/2018 10:56 AM    HDL Cholesterol 62 06/07/2018 10:56 AM    LDL, calculated 62 06/07/2018 10:56 AM    Triglyceride 69 06/07/2018 10:56 AM     Lab Results   Component Value Date/Time    GFR est non-AA 71 06/07/2018 10:56 AM    GFR est AA 82 06/07/2018 10:56 AM    Creatinine 0.86 06/07/2018 10:56 AM    BUN 13 06/07/2018 10:56 AM    Sodium 141 06/07/2018 10:56 AM    Potassium 4.2 06/07/2018 10:56 AM    Chloride 100 06/07/2018 10:56 AM    CO2 25 06/07/2018 10:56 AM    Magnesium 1.9 09/03/2014 10:55 AM        Review of Systems   Respiratory: Positive for cough. Negative for shortness of breath. Cardiovascular: Negative for chest pain. Physical Exam   Constitutional: She is oriented to person, place, and time. She appears well-developed and well-nourished. No distress. HENT:   Head: Normocephalic and atraumatic.    Right Ear: External ear normal.   Left Ear: External ear normal.   Mouth/Throat: Oropharynx is clear and moist. No oropharyngeal exudate. Eyes: Conjunctivae and EOM are normal. Right eye exhibits no discharge. Left eye exhibits no discharge. Neck: Normal range of motion. Neck supple. Cardiovascular: Normal rate, regular rhythm and normal heart sounds. Exam reveals no gallop and no friction rub. No murmur heard. Pulmonary/Chest: Effort normal and breath sounds normal. No respiratory distress. She has no wheezes. She has no rales. She exhibits no tenderness. Abdominal: Soft. She exhibits no distension and no mass. There is no tenderness. There is no rebound and no guarding. Musculoskeletal: Normal range of motion. She exhibits no edema, tenderness or deformity. Lymphadenopathy:     She has no cervical adenopathy. Neurological: She is alert and oriented to person, place, and time. Coordination normal.   Skin: Skin is warm and dry. No rash noted. She is not diaphoretic. No erythema. No pallor. Psychiatric: She has a normal mood and affect. Her behavior is normal.       ASSESSMENT and PLAN    ICD-10-CM ICD-9-CM    1. Essential hypertension with goal blood pressure less than 130/85    Initial BP elevated, repeat improved, generally very well controlled, continue losartan HCTZ 100-25   J78 131.0 METABOLIC PANEL, BASIC      HEMOGLOBIN A1C WITH EAG   2. Medicare annual wellness visit, subsequent S35.48 W76.8 METABOLIC PANEL, BASIC      HEMOGLOBIN A1C WITH EAG   3. GERD - Esophagitis - reflux    Improved with protonix BID, not taking any sucralfate, discussed trying to taper protonix down to once daily   L55.0 093.27 METABOLIC PANEL, BASIC      HEMOGLOBIN A1C WITH EAG   4. Pure hypercholesterolemia    At goal last check in 6/18 on zocor, will check annually   L19.82 070.7 METABOLIC PANEL, BASIC      HEMOGLOBIN A1C WITH EAG   5.  Osteopenia of multiple sites    Vit D for tx, repeat DEXA 9/2020   N48.79 192.69 METABOLIC PANEL, BASIC      HEMOGLOBIN A1C WITH EAG   6. Chronic cough    This is a longstanding problem, she has seen both GI and ENT in the past, continues to have a cough, she is taking zyrtec and takes protonix for GERD, advised her to restart flonase, there may be an asthmatic component as her sx are triggered by strong smells and foods, unable to afford inhalers which had helped in the past, will provide sample inhaler bevespi from the clinic today   A43 259.3 METABOLIC PANEL, BASIC      HEMOGLOBIN A1C WITH EAG   7. IFG (impaired fasting glucose)    Check a1c, wt has climbed, eval for progression towards diabetes   O64.12 601.90 METABOLIC PANEL, BASIC      HEMOGLOBIN A1C WITH EAG        Scribed by Jorge Luis Henderson of [address] [place] [address] [address], as dictated by Dr. Nat Casillas. Current diagnosis and concerns discussed with pt at length. Pt understands risks and benefits or current treatment plan and medications, and accepts the treatment and medication with any possible risks. Pt asks appropriate questions, which were answered. Pt was instructed to call with any concerns or problems. I have reviewed the note documented by the scribe. The services provided are my own.   The documentation is accurate

## 2019-01-10 LAB
BUN SERPL-MCNC: 10 MG/DL (ref 8–27)
BUN/CREAT SERPL: 10 (ref 12–28)
CALCIUM SERPL-MCNC: 10.5 MG/DL (ref 8.7–10.3)
CHLORIDE SERPL-SCNC: 102 MMOL/L (ref 96–106)
CO2 SERPL-SCNC: 23 MMOL/L (ref 20–29)
CREAT SERPL-MCNC: 1.01 MG/DL (ref 0.57–1)
EST. AVERAGE GLUCOSE BLD GHB EST-MCNC: 126 MG/DL
GLUCOSE SERPL-MCNC: 79 MG/DL (ref 65–99)
HBA1C MFR BLD: 6 % (ref 4.8–5.6)
POTASSIUM SERPL-SCNC: 4.6 MMOL/L (ref 3.5–5.2)
SODIUM SERPL-SCNC: 142 MMOL/L (ref 134–144)

## 2019-01-17 ENCOUNTER — TELEPHONE (OUTPATIENT)
Dept: INTERNAL MEDICINE CLINIC | Age: 69
End: 2019-01-17

## 2019-01-17 NOTE — TELEPHONE ENCOUNTER
Patient wanted to know if she should still be taking her calcium pill based on her lab results? Please call.

## 2019-01-17 NOTE — TELEPHONE ENCOUNTER
Called, spoke to pt. Two pt identifiers confirmed. Pt informed per Dr. Laura Castrejon to hold off calcium but to take vit d. Pt verbalized understanding of information discussed w/ no further questions at this time.

## 2019-01-17 NOTE — TELEPHONE ENCOUNTER
MD Jessi Alonzo LPN   Caller: Unspecified (Today, 10:31 AM)             No calcium needed, just take vitamin d

## 2019-02-25 ENCOUNTER — TELEPHONE (OUTPATIENT)
Dept: INTERNAL MEDICINE CLINIC | Age: 69
End: 2019-02-25

## 2019-02-25 RX ORDER — CYCLOBENZAPRINE HCL 5 MG
5 TABLET ORAL
Qty: 15 TAB | Refills: 0 | Status: SHIPPED | OUTPATIENT
Start: 2019-02-25 | End: 2019-08-22

## 2019-02-25 NOTE — TELEPHONE ENCOUNTER
Called, spoke to pt. Two pt identifiers confirmed. Informed pt that her Flexeril 5 mg has been sent to her pharmacy. Pt verbalized understanding of information discussed w/ no further questions at this time.

## 2019-02-25 NOTE — TELEPHONE ENCOUNTER
Patient states she needs a call back in reference to getting something called in for muscle spasms from moving furniture on Saturday & patient reports she is leaving for a cruise this week & would like to know if a small amount of something for muscle spasms can be prescribed for her to have on vacation. Please call.  Thank you

## 2019-02-27 RX ORDER — LOSARTAN POTASSIUM AND HYDROCHLOROTHIAZIDE 25; 100 MG/1; MG/1
TABLET ORAL
Qty: 90 TAB | Refills: 0 | Status: SHIPPED | OUTPATIENT
Start: 2019-02-27 | End: 2019-06-03 | Stop reason: SDUPTHER

## 2019-03-02 RX ORDER — SIMVASTATIN 40 MG/1
TABLET, FILM COATED ORAL
Qty: 90 TAB | Refills: 0 | Status: SHIPPED | OUTPATIENT
Start: 2019-03-02 | End: 2019-06-03 | Stop reason: SDUPTHER

## 2019-03-29 RX ORDER — PANTOPRAZOLE SODIUM 40 MG/1
TABLET, DELAYED RELEASE ORAL
Qty: 180 TAB | Refills: 0 | Status: SHIPPED | OUTPATIENT
Start: 2019-03-29 | End: 2019-08-22 | Stop reason: SDUPTHER

## 2019-04-25 ENCOUNTER — OFFICE VISIT (OUTPATIENT)
Dept: INTERNAL MEDICINE CLINIC | Age: 69
End: 2019-04-25

## 2019-04-25 VITALS
HEIGHT: 66 IN | TEMPERATURE: 98.1 F | BODY MASS INDEX: 29.12 KG/M2 | DIASTOLIC BLOOD PRESSURE: 64 MMHG | WEIGHT: 181.2 LBS | OXYGEN SATURATION: 100 % | HEART RATE: 80 BPM | RESPIRATION RATE: 20 BRPM | SYSTOLIC BLOOD PRESSURE: 136 MMHG

## 2019-04-25 DIAGNOSIS — F41.1 ANXIETY IN ACUTE STRESS REACTION: ICD-10-CM

## 2019-04-25 DIAGNOSIS — F43.0 ANXIETY IN ACUTE STRESS REACTION: ICD-10-CM

## 2019-04-25 DIAGNOSIS — R11.0 NAUSEA: Primary | ICD-10-CM

## 2019-04-25 RX ORDER — ONDANSETRON 4 MG/1
4 TABLET, ORALLY DISINTEGRATING ORAL
Qty: 30 TAB | Refills: 1 | Status: SHIPPED | OUTPATIENT
Start: 2019-04-25 | End: 2020-02-12

## 2019-04-25 RX ORDER — ALPRAZOLAM 0.25 MG/1
0.25 TABLET ORAL
Qty: 15 TAB | Refills: 1 | Status: SHIPPED | OUTPATIENT
Start: 2019-04-25 | End: 2020-02-12

## 2019-04-25 NOTE — PROGRESS NOTES
SUBJECTIVE  Ms. Kiko Murray presents today acutely for     Chief Complaint   Patient presents with    Diarrhea     pt here today c.o the following symptoms since Sunday; pt states that her brother was diagnosed with stage 4 cancer    Nausea    Headache    Fatigue     Aye Packer put my brother in hospice Sunday, and I've felt like this since then. \"     Pain in her abdomen comes and goes--epigastric. She has had nausea but no emesis. +Fatigue. \"When I smell food it make me sick. \"     Stools \"real long and skinny. \"     \"My tongue looks white. \"    OBJECTIVE  Visit Vitals  /64 (BP 1 Location: Left arm, BP Patient Position: Sitting)   Pulse 80   Temp 98.1 °F (36.7 °C) (Oral)   Resp 20   Ht 5' 6\" (1.676 m)   Wt 181 lb 3.2 oz (82.2 kg)   SpO2 100%   BMI 29.25 kg/m²     Gen: Pleasant 79 y.o.  female in NAD.   HEENT: PERRLA. EOMI. Oropharynx: Tongue looks a bit dry.   Neck: Supple.  No LAD.  HEART: RRR, No M/G/R.   LUNGS: CTAB No W/R.   ABDOMEN: S, NT, ND, BS+.   EXTREMITIES: Warm. No C/C/E. SKIN: Warm. Dry. No rashes or other lesions noted. ASSESSMENT / PLAN    ICD-10-CM ICD-9-CM    1. Nausea R11.0 787.02 ondansetron (ZOFRAN ODT) 4 mg disintegrating tablet   2. Anxiety in acute stress reaction F41.1 308.0 ALPRAZolam (XANAX) 0.25 mg tablet    F43.0         I have reviewed with the patient details of the assessment and plan and all questions were answered. Relevant patient education was performed. Follow-up and Dispositions    · Return if symptoms worsen or fail to improve.

## 2019-07-03 RX ORDER — LOSARTAN POTASSIUM AND HYDROCHLOROTHIAZIDE 25; 100 MG/1; MG/1
TABLET ORAL
Qty: 30 TAB | Refills: 0 | Status: SHIPPED | OUTPATIENT
Start: 2019-07-03 | End: 2019-11-12

## 2019-08-22 ENCOUNTER — OFFICE VISIT (OUTPATIENT)
Dept: INTERNAL MEDICINE CLINIC | Age: 69
End: 2019-08-22

## 2019-08-22 VITALS
SYSTOLIC BLOOD PRESSURE: 112 MMHG | OXYGEN SATURATION: 98 % | HEART RATE: 60 BPM | HEIGHT: 66 IN | WEIGHT: 188 LBS | BODY MASS INDEX: 30.22 KG/M2 | TEMPERATURE: 98 F | RESPIRATION RATE: 16 BRPM | DIASTOLIC BLOOD PRESSURE: 68 MMHG

## 2019-08-22 DIAGNOSIS — E78.00 PURE HYPERCHOLESTEROLEMIA: ICD-10-CM

## 2019-08-22 DIAGNOSIS — R73.01 IFG (IMPAIRED FASTING GLUCOSE): ICD-10-CM

## 2019-08-22 DIAGNOSIS — R05.3 CHRONIC COUGH: ICD-10-CM

## 2019-08-22 DIAGNOSIS — E66.3 OVERWEIGHT: ICD-10-CM

## 2019-08-22 DIAGNOSIS — F32.9 REACTIVE DEPRESSION: ICD-10-CM

## 2019-08-22 DIAGNOSIS — Z78.0 POST-MENOPAUSAL: ICD-10-CM

## 2019-08-22 DIAGNOSIS — K20.90 ESOPHAGITIS, UNSPECIFIED: ICD-10-CM

## 2019-08-22 DIAGNOSIS — K59.03 DRUG-INDUCED CONSTIPATION: ICD-10-CM

## 2019-08-22 DIAGNOSIS — M85.89 OSTEOPENIA OF MULTIPLE SITES: ICD-10-CM

## 2019-08-22 DIAGNOSIS — I10 ESSENTIAL HYPERTENSION WITH GOAL BLOOD PRESSURE LESS THAN 130/85: Primary | ICD-10-CM

## 2019-08-22 RX ORDER — FLUTICASONE PROPIONATE AND SALMETEROL 250; 50 UG/1; UG/1
1 POWDER RESPIRATORY (INHALATION) 2 TIMES DAILY
Qty: 1 INHALER | Refills: 1 | Status: SHIPPED | OUTPATIENT
Start: 2019-08-22 | End: 2020-02-12

## 2019-08-22 RX ORDER — PANTOPRAZOLE SODIUM 40 MG/1
40 TABLET, DELAYED RELEASE ORAL DAILY
Qty: 180 TAB | Refills: 1 | Status: SHIPPED | OUTPATIENT
Start: 2019-08-22 | End: 2020-11-16

## 2019-08-22 NOTE — PROGRESS NOTES
HISTORY OF PRESENT ILLNESS  Jhonatan Moreno is a 79 y.o. female. HPI   Last here 1/09/19. Pt is here for routine care.      BP is 112/68   129/60s at home    Continues on losartan-HCTZ 100-25mg daily  She took this med this AM  Recall pt had a cough on lisinopril. She was taking this medication for 20 years.  Avinash Quick today is 188 lbs- down 3 lbs x lov   Pt has not been working on her diet lately   Most of her wt loss has been due to her recent loss of her brother   Discussed monitoring calories   Discussed diet and weight loss and [employer]    Reviewed labs 1/19   Will get labs today      Pt follows with Dr. Qamar Moore (GI)   Last visit was 8/17  Pt takes protonix BID helps gerd/reflux--but recently decreased to once daily dosing   pt c/o worsening heartburn again today   Feels constipated, increased gas and bloating   Pt has been taking miralax for constipation once daily discussed can increase  Discussed she can also take senna   Discussed she likely needs to see Dr Qamar Moore again     Recall she previously followed with Dr. Qamar Moore about her stomach and episodes of diarrhea. Her stomach pain has resolved, continues on prevacid for this. Previously took amitiza prn but no longer on this, had hemorrhoid surgery.  Doing well       Pt was referred to Dr. Thais Lucero (cardio) by Dr. Qamar Moore (GI)   Last visit was 11/3/17     Pt is taking estradiol      Pt is taking eye drops      Continues on zocor 40mg daily for cholesterol      Lov, pt c/o a chronic recurrent cough x 9/2017  Provided an inhaler sample lov which helped--bevespi  Pt states this helped a lot but insurance won't cover it   Discussed advair is generic, will prescribe this   Recall Zyrtec and flonase help as well, was also seen by ENT in past  Noticed she coughed more when she smelled smoke or drank something cold   Has been using flonase which has also helped, she will buy more       Pt has been more depressed lately she lost her brother and aunt recently   She saw Dr Rashaad Johnson in 4/19 who gave her Xanax   Has these to use prn   Would not like any meds at this time          ACP not on file. SDM is her  Kwame Taylor). Provided this info today.       PREVENTIVE:  Colonoscopy:  with Dr. Parag Hodge, 10 yr f/u  EGD: 18 with Dr. Parag Hodge  AAA: [facility]  Pap: Dr. Oscar Mckeon,   Mammogram: 7/10/18, negative, scheduled 19   DEXA: 18, mild osteopenia  Tdap:   Pneumovax: 2017  Stgoxqo27: 18   Shingles: declines  Flu shot: declines    A1c: 10/16 6.4,  6.1,  6.0,  6.0,  6.0   Eye exam: Dr. Dary Khan, , due  Hep C screen:  negative  Lipids:  LDL 62  EK/3/17, nsr with cardio    Patient Active Problem List    Diagnosis Date Noted    Essential hypertension with goal blood pressure less than 130/85 2016    Pure hypercholesterolemia 2016    Osteopenia 2012    Constipation 2011    Glaucoma 2011    Vertigo 2011    Headache(784.0) 2011    GERD - Esophagitis - reflux 2011     Current Outpatient Medications   Medication Sig Dispense Refill    losartan-hydroCHLOROthiazide (HYZAAR) 100-25 mg per tablet TAKE 1 TABLET BY MOUTH EVERY DAY 30 Tab 0    simvastatin (ZOCOR) 40 mg tablet TAKE 1 TABLET BY MOUTH DAILY 90 Tab 0    ondansetron (ZOFRAN ODT) 4 mg disintegrating tablet Take 1 Tab by mouth every eight (8) hours as needed for Nausea. 30 Tab 1    ALPRAZolam (XANAX) 0.25 mg tablet Take 1 Tab by mouth two (2) times daily as needed for Anxiety. Max Daily Amount: 0.5 mg. 15 Tab 1    pantoprazole (PROTONIX) 40 mg tablet TAKE 1 TABLET BY MOUTH TWICE DAILY 180 Tab 0    cyclobenzaprine (FLEXERIL) 5 mg tablet Take 1 Tab by mouth three (3) times daily as needed for Muscle Spasm(s). 15 Tab 0    glycopyrrolate-formoterol (BEVESPI AEROSPHERE) 9-4.8 mcg HFAA Take 2 Puffs by inhalation daily.  1 Inhaler 0    albuterol (PROAIR HFA) 90 mcg/actuation inhaler Take 1 Puff by inhalation every four (4) hours as needed for Wheezing or Shortness of Breath. 1 Inhaler 1    fluticasone (FLOVENT HFA) 110 mcg/actuation inhaler Take 1 Puff by inhalation every twelve (12) hours. 1 Inhaler 0    budesonide (PULMICORT FLEXHALER) 90 mcg/actuation aepb inhaler Take 2 Puffs by inhalation two (2) times a day. 1 Inhaler 1    fluticasone (FLONASE ALLERGY RELIEF) 50 mcg/actuation nasal spray 2 Sprays by Both Nostrils route daily.  latanoprost (XALATAN) 0.005 % ophthalmic solution Administer 1 Drop to both eyes nightly. 0    raNITIdine (ZANTAC) 150 mg tablet Take 150 mg by mouth two (2) times daily as needed.  polyethylene glycol (MIRALAX) 17 gram packet Take 17 g by mouth daily. Past Surgical History:   Procedure Laterality Date    BREAST SURGERY PROCEDURE UNLISTED      COLONOSCOPY N/A 2/28/2018    COL[facility]ha Gall[facility] at [facility] ENDOSCOPY    HX BREAST BIOPSY Left 1980s    benign cyst    HX GI      hemmorhoidectomy    HX HYSTERECTOMY      HX OTHER SURGICAL      \"hemrrhoid surgery\"      Lab Results   Component Value Date/Time    WBC 4.5 06/07/2018 10:56 AM    HGB 12.5 06/07/2018 10:56 AM    HCT 36.8 06/07/2018 10:56 AM    PLATELET 006 57/01/6772 10:56 AM    MCV 90 06/07/2018 10:56 AM     Lab Results   Component Value Date/Time    Cholesterol, total 138 06/07/2018 10:56 AM    HDL Cholesterol 62 06/07/2018 10:56 AM    LDL, calculated 62 06/07/2018 10:56 AM    Triglyceride 69 06/07/2018 10:56 AM     Lab Results   Component Value Date/Time    GFR est non-AA 58 (L) 01/09/2019 02:08 PM    GFR est AA 67 01/09/2019 02:08 PM    Creatinine 1.01 (H) 01/09/2019 02:08 PM    BUN 10 01/09/2019 02:08 PM    Sodium 142 01/09/2019 02:08 PM    Potassium 4.6 01/09/2019 02:08 PM    Chloride 102 01/09/2019 02:08 PM    CO2 23 01/09/2019 02:08 PM    Magnesium 1.9 09/03/2014 10:55 AM        Review of Systems   Respiratory: Negative for shortness of breath. Cardiovascular: Negative for chest pain.        Physical Exam Constitutional: She is oriented to person, place, and time. She appears well-developed and well-nourished. No distress. HENT:   Head: Normocephalic and atraumatic. Mouth/Throat: Oropharynx is clear and moist. No oropharyngeal exudate. Eyes: Conjunctivae and EOM are normal. Right eye exhibits no discharge. Left eye exhibits no discharge. Neck: Normal range of motion. Neck supple. Cardiovascular: Normal rate, regular rhythm and normal heart sounds. Exam reveals no gallop and no friction rub. No murmur heard. Pulmonary/Chest: Effort normal and breath sounds normal. No respiratory distress. She has no wheezes. She has no rales. She exhibits no tenderness. Musculoskeletal: Normal range of motion. She exhibits no edema, tenderness or deformity. Lymphadenopathy:     She has no cervical adenopathy. Neurological: She is alert and oriented to person, place, and time. Coordination normal.   Skin: Skin is warm and dry. No rash noted. She is not diaphoretic. No erythema. No pallor. Psychiatric: She has a normal mood and affect. Her behavior is normal.       ASSESSMENT and PLAN    ICD-10-CM ICD-9-CM    1. Essential hypertension with goal blood pressure less than 130/85      Controlled on losartan and HCTZ continue  I10 401.9    2. Pure hypercholesterolemia                Controlled on zocor last year, repeat lipids today  E78.00 272.0    3. Osteopenia of multiple sites              Vit D for tx, dexa due 9/2020  M85.89 733.90    4. GERD - Esophagitis - reflux                Follows with Dr Nancey Sandifer in the past, currently on protonix once daily using zantac prn having some reccurent sxs will need egd at this time  K20.9 530.10    5. Overweight          Counseled on diet wt loss portion control, [employer]  E66.3 278.02    6. IFG (impaired fasting glucose)              Check a1c  R73.01 790.21    7.  Chronic cough                  Has been evaluated by both GI and ENT has zyrtec and protonix restarted flonase lov and provided bevesi inhaler this helped however inhaler costs are still quite high advair has just gone generic will give this a try  R05 786.2    8. Anxiety- related to death of her brother doing well w/o xanax  No meds needed   9. Constipation-  Discussed adding senna, can increase miralax to 2 per day if needed       Depression screen reviewed and negative. Scribed by Ernesto Buerger of [address] [place] [address] [address], as dictated by Dr. Bernardo Sagastume. Current diagnosis and concerns discussed with pt at length. Pt understands risks and benefits or current treatment plan and medications, and accepts the treatment and medication with any possible risks. Pt asks appropriate questions, which were answered. Pt was instructed to call with any concerns or problems. I have reviewed the note documented by the scribe. The services provided are my own.   The documentation is accurate

## 2019-08-22 NOTE — PATIENT INSTRUCTIONS
Office Policies    Phone calls/patient messages:            Please allow up to 24 hours for someone in the office to contact you about your call or message. Be mindful your provider may be out of the office or your message may require further review. We encourage you to use [employer] for your messages as this is a faster, more efficient way to communicate with our office                         Medication Refills:            Prescription medications require 48-72 business hours to process. We encourage you to use [employer] for your refills. For controlled medications: Please allow 72 business hours to process. Certain medications may require you to  a written prescription at our office. NO narcotic/controlled medications will be prescribed after 4pm Monday through Friday or on weekends              Form/Paperwork Completion:            Please note a $25 fee may incur for all paperwork for completed by our providers. We ask that you allow 7-10 business days. Pre-payment is due prior to picking up/faxing the completed form. You may also download your forms to [employer] to have your doctor print off.       Can take extra dose of miralax daily     Can add senna for constipation    Labs today

## 2019-08-23 LAB
ALBUMIN SERPL-MCNC: 5 G/DL (ref 3.6–4.8)
ALBUMIN/GLOB SERPL: 2.2 {RATIO} (ref 1.2–2.2)
ALP SERPL-CCNC: 67 IU/L (ref 39–117)
ALT SERPL-CCNC: 12 IU/L (ref 0–32)
AST SERPL-CCNC: 18 IU/L (ref 0–40)
BILIRUB SERPL-MCNC: 0.5 MG/DL (ref 0–1.2)
BUN SERPL-MCNC: 16 MG/DL (ref 8–27)
BUN/CREAT SERPL: 16 (ref 12–28)
CALCIUM SERPL-MCNC: 10.3 MG/DL (ref 8.7–10.3)
CHLORIDE SERPL-SCNC: 101 MMOL/L (ref 96–106)
CHOLEST SERPL-MCNC: 155 MG/DL (ref 100–199)
CO2 SERPL-SCNC: 24 MMOL/L (ref 20–29)
CREAT SERPL-MCNC: 0.98 MG/DL (ref 0.57–1)
ERYTHROCYTE [DISTWIDTH] IN BLOOD BY AUTOMATED COUNT: 13.7 % (ref 12.3–15.4)
EST. AVERAGE GLUCOSE BLD GHB EST-MCNC: 126 MG/DL
GLOBULIN SER CALC-MCNC: 2.3 G/DL (ref 1.5–4.5)
GLUCOSE SERPL-MCNC: 77 MG/DL (ref 65–99)
HBA1C MFR BLD: 6 % (ref 4.8–5.6)
HCT VFR BLD AUTO: 35.7 % (ref 34–46.6)
HDLC SERPL-MCNC: 67 MG/DL
HGB BLD-MCNC: 11.9 G/DL (ref 11.1–15.9)
LDLC SERPL CALC-MCNC: 77 MG/DL (ref 0–99)
MCH RBC QN AUTO: 30.1 PG (ref 26.6–33)
MCHC RBC AUTO-ENTMCNC: 33.3 G/DL (ref 31.5–35.7)
MCV RBC AUTO: 90 FL (ref 79–97)
PLATELET # BLD AUTO: 337 X10E3/UL (ref 150–450)
POTASSIUM SERPL-SCNC: 4.1 MMOL/L (ref 3.5–5.2)
PROT SERPL-MCNC: 7.3 G/DL (ref 6–8.5)
RBC # BLD AUTO: 3.95 X10E6/UL (ref 3.77–5.28)
SODIUM SERPL-SCNC: 141 MMOL/L (ref 134–144)
TRIGL SERPL-MCNC: 55 MG/DL (ref 0–149)
TSH SERPL DL<=0.005 MIU/L-ACNC: 2.05 UIU/ML (ref 0.45–4.5)
VLDLC SERPL CALC-MCNC: 11 MG/DL (ref 5–40)
WBC # BLD AUTO: 6 X10E3/UL (ref 3.4–10.8)

## 2019-09-05 ENCOUNTER — HOSPITAL ENCOUNTER (OUTPATIENT)
Dept: MAMMOGRAPHY | Age: 69
Discharge: HOME | End: 2019-09-05
Attending: INTERNAL MEDICINE
Payer: MEDICARE

## 2019-09-05 DIAGNOSIS — Z12.39 SCREENING BREAST EXAMINATION: ICD-10-CM

## 2019-09-05 PROCEDURE — 77067 SCR MAMMO BI INCL CAD: CPT

## 2019-11-12 RX ORDER — LOSARTAN POTASSIUM 100 MG/1
100 TABLET ORAL DAILY
Qty: 30 TAB | Refills: 0 | Status: SHIPPED | OUTPATIENT
Start: 2019-11-12 | End: 2019-12-13 | Stop reason: SDUPTHER

## 2019-11-12 RX ORDER — HYDROCHLOROTHIAZIDE 25 MG/1
25 TABLET ORAL DAILY
Qty: 30 TAB | Refills: 0 | Status: SHIPPED | OUTPATIENT
Start: 2019-11-12 | End: 2019-12-09 | Stop reason: SDUPTHER

## 2019-11-12 NOTE — TELEPHONE ENCOUNTER
Relationship of caller (if not patient):       Best contact number(s):[phone]       Name of medication and dosage if known:       Is patient out of this medication (yes/no): yes       Pharmacy name: [address] [place] [address] listed in chart? (yes/no): yes [phone]   Pharmacy phone number:       Details to clarify the request: Patient is requesting a substitute medication for Losartan the pharmacy is on back order she hasn't taken her medication in 5 days       Mary Hicks / Liliya Parker

## 2019-11-13 RX ORDER — LOSARTAN POTASSIUM 100 MG/1
100 TABLET ORAL DAILY
Qty: 90 TAB | Refills: 0 | Status: CANCELLED | OUTPATIENT
Start: 2019-11-13

## 2019-11-13 RX ORDER — HYDROCHLOROTHIAZIDE 25 MG/1
TABLET ORAL
Qty: 90 TAB | Refills: 0 | OUTPATIENT
Start: 2019-11-13

## 2019-11-13 NOTE — TELEPHONE ENCOUNTER
PCP: Kailyn Martínez MD    Last appt: 8/22/2019  No future appointments. Requested Prescriptions     Pending Prescriptions Disp Refills    losartan (COZAAR) 100 mg tablet 90 Tab 0     Sig: Take 1 Tab by mouth daily.      Refused Prescriptions Disp Refills    hydroCHLOROthiazide (HYDRODIURIL) 25 mg tablet [Pharmacy Med Name: HYDROCHLOROTHIAZIDE 25MG TABLETS] 90 Tab 0     Sig: TAKE 1 TABLET BY MOUTH ONCE DAILY     Refused By: Qamar Tucker     Reason for Refusal: other

## 2019-11-15 NOTE — TELEPHONE ENCOUNTER
Dalia Ou, LPN   Caller: Unspecified (3 days ago, 11:45 AM)             Pt scheduled Wednesday, February 12, 2020 @ 02:45 PM

## 2019-11-29 ENCOUNTER — TELEPHONE (OUTPATIENT)
Dept: INTERNAL MEDICINE CLINIC | Age: 69
End: 2019-11-29

## 2019-11-29 RX ORDER — CYCLOBENZAPRINE HCL 5 MG
5 TABLET ORAL
Qty: 20 TAB | Refills: 0 | Status: SHIPPED | OUTPATIENT
Start: 2019-11-29 | End: 2020-02-12

## 2019-11-29 NOTE — TELEPHONE ENCOUNTER
#[phone] pt states she lifted her suitcase and did something to her back. Pt can't get out of bed. Pt is requesting medication for the muscle spasms to be called in for her to [facility] on file. Please call pt to let her know what you can do.

## 2019-11-29 NOTE — TELEPHONE ENCOUNTER
Called, spoke to pt. Two pt identifiers confirmed. Pt informed per Dr. Julien Horvath flexmodel ordered in a limited amount. Pt informed per Dr. Julien Horvath o/v is required if not improving from the medication. Pt verbalized understanding of information discussed w/ no further questions at this time.

## 2019-11-30 RX ORDER — SIMVASTATIN 40 MG/1
TABLET, FILM COATED ORAL
Qty: 90 TAB | Refills: 0 | Status: SHIPPED | OUTPATIENT
Start: 2019-11-30 | End: 2020-02-28

## 2019-12-10 RX ORDER — HYDROCHLOROTHIAZIDE 25 MG/1
TABLET ORAL
Qty: 30 TAB | Refills: 0 | Status: SHIPPED | OUTPATIENT
Start: 2019-12-10 | End: 2020-02-12 | Stop reason: SDUPTHER

## 2019-12-12 ENCOUNTER — TELEPHONE (OUTPATIENT)
Dept: INTERNAL MEDICINE CLINIC | Age: 69
End: 2019-12-12

## 2019-12-12 NOTE — TELEPHONE ENCOUNTER
----- Message from Miles Horton sent at 12/12/2019 11:20 AM EST -----  Regarding: Dr. Faiza Johnson: [phone] (if not patient): n/a   Relationship of caller (if not patient): n/a  Best contact number(s): [phone]  Name of medication and dosage if known: Losartan   Is patient out of this medication (yes/no): yes   Pharmacy name: [facility] listed in chart? (yes/no): yes  Pharmacy phone number: [phone]  Date of last visit: 08/22/19  Details to clarify the request: Pt asked could pt take the Hydrochlorothiazide 25 mg without  the Losartan. Please verify with pt.

## 2019-12-13 NOTE — TELEPHONE ENCOUNTER
PCP: Theresa Spain MD    Last appt: 8/22/2019  Future Appointments   Date Time Provider Chanda Machado   2/12/2020  2:45 PM Theresa Spain MD [facility]       Requested Prescriptions     Pending Prescriptions Disp Refills    losartan (COZAAR) 100 mg tablet 90 Tab 0     Sig: Take 1 Tab by mouth daily.

## 2019-12-16 RX ORDER — LOSARTAN POTASSIUM 100 MG/1
100 TABLET ORAL DAILY
Qty: 90 TAB | Refills: 0 | Status: SHIPPED | OUTPATIENT
Start: 2019-12-16 | End: 2019-12-16 | Stop reason: SDUPTHER

## 2019-12-16 RX ORDER — LOSARTAN POTASSIUM 100 MG/1
100 TABLET ORAL DAILY
Qty: 90 TAB | Refills: 0 | Status: SHIPPED | OUTPATIENT
Start: 2019-12-16 | End: 2020-02-12 | Stop reason: SDUPTHER

## 2019-12-16 NOTE — TELEPHONE ENCOUNTER
Called, spoke to pt. Two pt identifiers confirmed. Pt informed per Dr. Aurora Sauer to continue losartan w/ HCTZ--pended in refill encounter. Pt verbalized understanding of information discussed w/ no further questions at this time.

## 2019-12-16 NOTE — TELEPHONE ENCOUNTER
PCP: Anand Garcia MD    Last appt: 8/22/2019  Future Appointments   Date Time Provider Chanda Machado   2/12/2020  2:45 PM MD [facility]       Requested Prescriptions     Pending Prescriptions Disp Refills    losartan (COZAAR) 100 mg tablet 90 Tab 0     Sig: Take 1 Tab by mouth daily.

## 2020-02-11 ENCOUNTER — DOCUMENTATION ONLY (OUTPATIENT)
Dept: INTERNAL MEDICINE CLINIC | Age: 70
End: 2020-02-11

## 2020-02-11 NOTE — PROGRESS NOTES
Medicare Part B Preventive Services Guidelines/Limitations Date last completed and Frequency Due Date   Bone Mass Measurement  (age 72 & older, biennial) Requires diagnosis related to osteoporosis or estrogen deficiency. Biennial benefit unless patient has history of long-term glucocorticoid tx or baseline is needed because initial test was by other method Completed 9/2018     Recommended every 2 years Due 9/2020   Cardiovascular Screening Blood Tests (every 5 years)  Total cholesterol, HDL, Triglycerides Order as a panel if possible Completed 8/2019     Recommended annually Due 8/2020    Colorectal Cancer Screening  -Fecal occult blood test (annual)  -Flexible sigmoidoscopy (5y)  -Screening colonoscopy (10y)  -Barium Enema   Completed 2/2018 with Dr Manuelito Cartagena in 10 years  [id number]     Counseling to Prevent Tobacco Use (up to 8 sessions per year)  - Counseling greater than 3 and up to 10 minutes  - Counseling greater than 10 minutes Patients must be asymptomatic of tobacco-related conditions to receive as preventive service n/a n/a   Diabetes Screening Tests (at least every 3 years, Medicare covers annually or at 6-month intervals for prediabetic patients)     Fasting blood sugar (FBS) or glucose tolerance test (GTT) Patient must be diagnosed with one of the following:  -Hypertension, Dyslipidemia, obesity, previous impaired FBS or GTT  Or any two of the following: overweight, FH of diabetes, age ? 72, history of gestational diabetes, birth of baby weighing more than 9 pounds Completed 9/2019 with A1C 6.0        Recommended every 3-6 months for Pre-Diabetics and Diabetics    Due now   Diabetes Self-Management Training (DSMT) (no USPSTF recommendation) Requires referral by treating physician for patient with diabetes or renal disease. 10 hours of initial DSMT session of no less than 30 minutes each in a continuous 12-month period.  2 hours of follow-up DSMT in subsequent years.  n/a n/a   Glaucoma Screening (no USPSTF recommendation) Diabetes mellitus, family history, , age 48 or over,  American, age 72 or over Completed 9/2017     Recommended annually Due now   Human Immunodeficiency Virus (HIV) Screening (annually for increased risk patients)  HIV-1 and HIV-2 by EIA, NICKO, rapid antibody test, or oral mucosa transudate Patient must be at increased risk for HIV infection per USPSTF guidelines or pregnant.  Tests covered annually for patients at increased risk.  Pregnant patients may receive up to 3 test during pregnancy. n/a n/a   Medical Nutrition Therapy (MNT) (for diabetes or renal disease not recommended schedule) Requires referral by treating physician for patient with diabetes or renal disease.  Can be provided in same year as diabetes self-management training (DSMT), and CMS recommends medical nutrition therapy take place after DSMT. Roger Leader to 3 hours for initial year and 2 hours in subsequent years. n/a n/a             Seasonal Influenza Vaccination (annually)   Completed   Recommended Annually You decline   Pneumococcal Vaccination (once after 65)   Pneumococcal 23 -2/6/17  Recommended once over the age of 72     Prevnar 15 - 7/9/18 Recommended once over the age of 72 Complete              Complete   Hepatitis B Vaccinations (if medium/high risk) Medium/high risk factors:  End-stage renal disease,  Hemophiliacs who received Factor VIII or IX concentrates, Clients of institutions for the mentally retarded, Persons who live in the same house as a HepB virus carrier, Homosexual men, Illicit injectable drug abusers. n/a n/a   Screening Mammography (biennial age 54-69)?  Annually (age 36 or over) Completed 9/2019     Recommended annually    Due 9/2020    Screening Pap Tests and Pelvic Examination (up to age 79 and after 79 if unknown history or abnormal study last 10 years) Every 24 months except high risk Completed 4/2017     Recommended every other year Due now    Ultrasound Screening for Abdominal Aortic Aneurysm (AAA) (once) Patient must be referred through IPPE and not have had a screening for abdominal aortic aneurysm before under Medicare.  Limited to patients who meet one of the following criteria:  - Men who are 73-68 years old and have smoked more than 100 cigarettes in their lifetime.  -Anyone with a FH of AAA  -Anyone recommended for screening by USPSTF n/a n/a

## 2020-02-12 ENCOUNTER — OFFICE VISIT (OUTPATIENT)
Dept: INTERNAL MEDICINE CLINIC | Age: 70
End: 2020-02-12

## 2020-02-12 VITALS
WEIGHT: 187 LBS | BODY MASS INDEX: 30.05 KG/M2 | TEMPERATURE: 98.3 F | HEIGHT: 66 IN | SYSTOLIC BLOOD PRESSURE: 136 MMHG | RESPIRATION RATE: 16 BRPM | DIASTOLIC BLOOD PRESSURE: 83 MMHG | HEART RATE: 73 BPM | OXYGEN SATURATION: 100 %

## 2020-02-12 DIAGNOSIS — G89.29 CHRONIC LEFT SHOULDER PAIN: ICD-10-CM

## 2020-02-12 DIAGNOSIS — R73.01 IFG (IMPAIRED FASTING GLUCOSE): ICD-10-CM

## 2020-02-12 DIAGNOSIS — M85.89 OSTEOPENIA OF MULTIPLE SITES: ICD-10-CM

## 2020-02-12 DIAGNOSIS — E78.00 PURE HYPERCHOLESTEROLEMIA: ICD-10-CM

## 2020-02-12 DIAGNOSIS — I10 ESSENTIAL HYPERTENSION WITH GOAL BLOOD PRESSURE LESS THAN 130/85: Primary | ICD-10-CM

## 2020-02-12 DIAGNOSIS — M25.512 CHRONIC LEFT SHOULDER PAIN: ICD-10-CM

## 2020-02-12 DIAGNOSIS — M54.6 PAIN IN THORACIC SPINE: Primary | ICD-10-CM

## 2020-02-12 DIAGNOSIS — R05.9 COUGH: ICD-10-CM

## 2020-02-12 DIAGNOSIS — K20.90 ESOPHAGITIS, UNSPECIFIED: ICD-10-CM

## 2020-02-12 DIAGNOSIS — R14.0 BLOATING: ICD-10-CM

## 2020-02-12 DIAGNOSIS — R20.0 HAND NUMBNESS: ICD-10-CM

## 2020-02-12 DIAGNOSIS — Z00.00 MEDICARE ANNUAL WELLNESS VISIT, SUBSEQUENT: ICD-10-CM

## 2020-02-12 DIAGNOSIS — E66.9 OBESITY (BMI 30.0-34.9): ICD-10-CM

## 2020-02-12 DIAGNOSIS — M54.6 THORACIC SPINE PAIN: ICD-10-CM

## 2020-02-12 RX ORDER — HYDROCHLOROTHIAZIDE 25 MG/1
TABLET ORAL
Qty: 90 TAB | Refills: 1 | Status: SHIPPED | OUTPATIENT
Start: 2020-02-12 | End: 2020-08-06

## 2020-02-12 RX ORDER — CYCLOBENZAPRINE HCL 5 MG
5 TABLET ORAL
Qty: 30 TAB | Refills: 0 | Status: SHIPPED | OUTPATIENT
Start: 2020-02-12 | End: 2020-12-09 | Stop reason: SDUPTHER

## 2020-02-12 RX ORDER — LOSARTAN POTASSIUM 100 MG/1
100 TABLET ORAL DAILY
Qty: 90 TAB | Refills: 1 | Status: SHIPPED | OUTPATIENT
Start: 2020-02-12 | End: 2020-08-17

## 2020-02-12 NOTE — PROGRESS NOTES
This is the Subsequent Medicare Annual Wellness Exam, performed 12 months or more after the Initial AWV or the last Subsequent AWV    I have reviewed the patient's medical history in detail and updated the computerized patient record. History     Patient Active Problem List   Diagnosis Code    GERD - Esophagitis - reflux K20.9    Headache(784.0) R51    Constipation K59.00    Glaucoma H40.9    Vertigo R42    Osteopenia M85.80    Essential hypertension with goal blood pressure less than 130/85 I10    Pure hypercholesterolemia E78.00     Past Medical History:   Diagnosis Date    Arthritis     narrowing of spine    Environmental and seasonal allergies     GERD - Esophagitis - reflux 3/22/2011    Headache(784.0) 6/27/2011    Hypercholesterolemia 3/22/2011    Hypertension 3/22/2011    Other ill-defined conditions(799.89)     high cholesterol, vertigo, migraine    Ulcer       Past Surgical History:   Procedure Laterality Date    BREAST SURGERY PROCEDURE UNLISTED      COLONOSCOPY N/A 2/28/2018    COLONOSCOPY performed by Mary Bello MD at \[facility]\"" ENDOSCOPY    HX BREAST BIOPSY Left 1980s    benign cyst    HX GI      hemmorhoidectomy    HX HYSTERECTOMY      HX OTHER SURGICAL      \"hemrrhoid surgery\"     Current Outpatient Medications   Medication Sig Dispense Refill    losartan (COZAAR) 100 mg tablet Take 1 Tab by mouth daily. 90 Tab 0    hydroCHLOROthiazide (HYDRODIURIL) 25 mg tablet TAKE 1 TABLET BY MOUTH ONCE DAILY 30 Tab 0    simvastatin (ZOCOR) 40 mg tablet TAKE 1 TABLET BY MOUTH DAILY 90 Tab 0    cyclobenzaprine (FLEXERIL) 5 mg tablet Take 1 Tab by mouth three (3) times daily as needed for Muscle Spasm(s). 20 Tab 0    pantoprazole (PROTONIX) 40 mg tablet Take 1 Tab by mouth daily. 180 Tab 1    fluticasone propion-salmeterol (ADVAIR/WIXELA) 250-50 mcg/dose diskus inhaler Take 1 Puff by inhalation two (2) times a day.  1 Inhaler 1    ondansetron (ZOFRAN ODT) 4 mg disintegrating tablet Take 1 Tab by mouth every eight (8) hours as needed for Nausea. 30 Tab 1    ALPRAZolam (XANAX) 0.25 mg tablet Take 1 Tab by mouth two (2) times daily as needed for Anxiety. Max Daily Amount: 0.5 mg. 15 Tab 1    glycopyrrolate-formoterol (BEVESPI AEROSPHERE) 9-4.8 mcg HFAA Take 2 Puffs by inhalation daily. 1 Inhaler 0    albuterol (PROAIR HFA) 90 mcg/actuation inhaler Take 1 Puff by inhalation every four (4) hours as needed for Wheezing or Shortness of Breath. 1 Inhaler 1    fluticasone (FLOVENT HFA) 110 mcg/actuation inhaler Take 1 Puff by inhalation every twelve (12) hours. 1 Inhaler 0    fluticasone (FLONASE ALLERGY RELIEF) 50 mcg/actuation nasal spray 2 Sprays by Both Nostrils route daily.  latanoprost (XALATAN) 0.005 % ophthalmic solution Administer 1 Drop to both eyes nightly. 0    raNITIdine (ZANTAC) 150 mg tablet Take 150 mg by mouth two (2) times daily as needed.  polyethylene glycol (MIRALAX) 17 gram packet Take 17 g by mouth daily. No Known Allergies    Family History   Problem Relation Age of Onset    Hypertension Mother     Hypertension Father      Social History     Tobacco Use    Smoking status: Never Smoker    Smokeless tobacco: Never Used   Substance Use Topics    Alcohol use: No       Depression Risk Factor Screening:     3 most recent PHQ Screens 2/12/2020   Little interest or pleasure in doing things Not at all   Feeling down, depressed, irritable, or hopeless Not at all   Total Score PHQ 2 0       Alcohol Risk Factor Screening:   Do you average 1 drink per night or more than 7 drinks a week:  No    On any one occasion in the past three months have you have had more than 3 drinks containing alcohol:  No      Functional Ability and Level of Safety:   Hearing: Hearing is good. Activities of Daily Living: The home contains: no safety equipment.   Patient does total self care    Ambulation: with no difficulty    Fall Risk:  Fall Risk Assessment, last 12 mths 2/12/2020 Able to walk? Yes   Fall in past 12 months? No       Abuse Screen:  Patient is not abused  Lives with   safe  Cognitive Screening   Has your family/caregiver stated any concerns about your memory: no  Cognitive Screening: Normal - Mini Cog Test    Patient Care Team   Patient Care Team:  Erika Sloan MD as PCP - General (Internal Medicine)  Erika Sloan MD as PCP - [facility] Empaneled Provider  Eusebio Castrejon MD as Physician (Cardiology)  Ela Gonzalez MD (Gastroenterology)  Delmi Haddad MD (Ophthalmology)  Naheed Kahn MD (Obstetrics & Gynecology)   updated    Assessment/Plan   Education and counseling provided:  Are appropriate based on today's review and evaluation  End-of-Life planning (with patient's consent)  Influenza Vaccine  Screening Pap and pelvic (covered once every 2 years)  Bone mass measurement (DEXA)  Diabetes screening test    Diagnoses and all orders for this visit:    1. Medicare annual wellness visit, subsequent        Health Maintenance Due   Topic Date Due    Shingrix Vaccine Age 49> (1 of 2) 2002    GLAUCOMA SCREENING Q2Y  2017    Medicare Yearly Exam  01/10/2020     Discussed with patient about advance medical directive. Provided patient blank AMD and Your Right to Decide Booklet. Requested that if completed to provide a copy of AMD to office. ACP not on file. SDM is her  Min Bird). Provided this info today.         Colonoscopy:  with Dr. Kenn Morris, 10 yr f/u  AAA: [facility] AAA  Pap: Dr. Cristal Zapata, , provided info for gyn   Mammogram: . Negative  Annual   Vallie Colder due     Tdap:   Pneumovax: 2017  Sexyswg33: 18   Shingles: declines  Flu shot: declines      A1c:   6.0 due  Hep C screen:  negative  Lipids:   LDL 77 annual     Eye exam: Dr. Neil Ferrari,   Annual       EK/3/17, nsr with cardio       Medication reconciliation completed by MA and reviewed by me. Medical/surgical/social/family history reviewed and updated by me. Patient provided AVS and preventative screening table. Patient verbalized understanding of all information discussed.

## 2020-02-12 NOTE — PATIENT INSTRUCTIONS
Medicare Wellness Visit, Female     The best way to live healthy is to have a lifestyle where you eat a well-balanced diet, exercise regularly, limit alcohol use, and quit all forms of tobacco/nicotine, if applicable. Regular preventive services are another way to keep healthy. Preventive services (vaccines, screening tests, monitoring & exams) can help personalize your care plan, which helps you manage your own care. Screening tests can find health problems at the earliest stages, when they are easiest to treat. [facility] follows the current, evidence-based guidelines published by the [facility] ([facility]) when recommending preventive services for our patients. Because we follow these guidelines, sometimes recommendations change over time as research supports it. (For example, mammograms used to be recommended annually. Even though Medicare will still pay for an annual mammogram, the newer guidelines recommend a mammogram every two years for women of average risk). Of course, you and your doctor may decide to screen more often for some diseases, based on your risk and your co-morbidities (chronic disease you are already diagnosed with). Preventive services for you include:  - Medicare offers their members a free annual wellness visit, which is time for you and your primary care provider to discuss and plan for your preventive service needs. Take advantage of this benefit every year!  -All adults over the age of 72 should receive the recommended pneumonia vaccines. Current USPSTF guidelines recommend a series of two vaccines for the best pneumonia protection.   -All adults should have a flu vaccine yearly and a tetanus vaccine every 10 years.   -All adults age 48 and older should receive the shingles vaccines (series of two vaccines).       -All adults age 38-68 who are overweight should have a diabetes screening test once every three years.   -All adults born between 80 and 1965 should be screened once for Hepatitis C.  -Other screening tests and preventive services for persons with diabetes include: an eye exam to screen for diabetic retinopathy, a kidney function test, a foot exam, and stricter control over your cholesterol.   -Cardiovascular screening for adults with routine risk involves an electrocardiogram (ECG) at intervals determined by your doctor.   -Colorectal cancer screenings should be done for adults age 54-65 with no increased risk factors for colorectal cancer. There are a number of acceptable methods of screening for this type of cancer. Each test has its own benefits and drawbacks. Discuss with your doctor what is most appropriate for you during your annual wellness visit. The different tests include: colonoscopy (considered the best screening method), a fecal occult blood test, a fecal DNA test, and sigmoidoscopy.    -A bone mass density test is recommended when a woman turns 65 to screen for osteoporosis. This test is only recommended one time, as a screening. Some providers will use this same test as a disease monitoring tool if you already have osteoporosis. -Breast cancer screenings are recommended every other year for women of normal risk, age 54-69.  -Cervical cancer screenings for women over age 72 are only recommended with certain risk factors. Here is a list of your current Health Maintenance items (your personalized list of preventive services) with a due date:  Health Maintenance Due   Topic Date Due    Shingles Vaccine (1 of 2) 11/25/2002    Glaucoma Screening   11/25/2017    Annual Well Visit  01/10/2020         Medicare Part B Preventive Services Guidelines/Limitations Date last completed and Frequency Due Date   Bone Mass Measurement  (age 72 & older, biennial) Requires diagnosis related to osteoporosis or estrogen deficiency.  Biennial benefit unless patient has history of long-term glucocorticoid tx or baseline is needed because initial test was by other method Completed 9/2018     Recommended every 2 years Due 9/2020   Cardiovascular Screening Blood Tests (every 5 years)  Total cholesterol, HDL, Triglycerides Order as a panel if possible Completed 8/2019     Recommended annually Due 8/2020    Colorectal Cancer Screening  -Fecal occult blood test (annual)  -Flexible sigmoidoscopy (5y)  -Screening colonoscopy (10y)  -Barium Enema   Completed 2/2018 with Dr Usman Edge in 10 years  [id number]     Counseling to Prevent Tobacco Use (up to 8 sessions per year)  - Counseling greater than 3 and up to 10 minutes  - Counseling greater than 10 minutes Patients must be asymptomatic of tobacco-related conditions to receive as preventive service n/a n/a   Diabetes Screening Tests (at least every 3 years, Medicare covers annually or at 6-month intervals for prediabetic patients)     Fasting blood sugar (FBS) or glucose tolerance test (GTT) Patient must be diagnosed with one of the following:  -Hypertension, Dyslipidemia, obesity, previous impaired FBS or GTT  Or any two of the following: overweight, FH of diabetes, age ? 72, history of gestational diabetes, birth of baby weighing more than 9 pounds Completed 9/2019 with A1C 6.0        Recommended every 3-6 months for Pre-Diabetics and Diabetics    Due now   Diabetes Self-Management Training (DSMT) (no USPSTF recommendation) Requires referral by treating physician for patient with diabetes or renal disease. 10 hours of initial DSMT session of no less than 30 minutes each in a continuous 12-month period.  2 hours of follow-up DSMT in subsequent years.  n/a n/a   Glaucoma Screening (no USPSTF recommendation) Diabetes mellitus, family history, , age 48 or over,  American, age 72 or over Completed 9/2019      Recommended annually Due 9/2020    Human Immunodeficiency Virus (HIV) Screening (annually for increased risk patients)  HIV-1 and HIV-2 by EIA, NICKO, rapid antibody test, or oral mucosa transudate Patient must be at increased risk for HIV infection per USPSTF guidelines or pregnant.  Tests covered annually for patients at increased risk.  Pregnant patients may receive up to 3 test during pregnancy. n/a n/a   Medical Nutrition Therapy (MNT) (for diabetes or renal disease not recommended schedule) Requires referral by treating physician for patient with diabetes or renal disease.  Can be provided in same year as diabetes self-management training (DSMT), and CMS recommends medical nutrition therapy take place after DSMT. Henrique Valentin to 3 hours for initial year and 2 hours in subsequent years. n/a n/a             Seasonal Influenza Vaccination (annually)   Completed   Recommended Annually You decline   Pneumococcal Vaccination (once after 65)   Pneumococcal 23 -2/6/17  Recommended once over the age of 72     Prevnar 15 - 7/9/18 Recommended once over the age of 72 Complete              Complete   Hepatitis B Vaccinations (if medium/high risk) Medium/high risk factors:  End-stage renal disease,  Hemophiliacs who received Factor VIII or IX concentrates, Clients of institutions for the mentally retarded, Persons who live in the same house as a HepB virus carrier, Homosexual men, Illicit injectable drug abusers. n/a n/a   Screening Mammography (biennial age 54-69)?  Annually (age 36 or over) Completed 9/2019     Recommended annually    Due 9/2020    Screening Pap Tests and Pelvic Examination (up to age 79 and after 79 if unknown history or abnormal study last 10 years) Every 25 months except high risk Completed 4/2017     Recommended every other year Due now    Ultrasound Screening for Abdominal Aortic Aneurysm (AAA) (once) Patient must be referred through IPPE and not have had a screening for abdominal aortic aneurysm before under Medicare.  Limited to patients who meet one of the following criteria:  - Men who are 73-68 years old and have smoked more than 100 cigarettes in their lifetime.  -Anyone with a FH of AAA  -Anyone recommended for screening by USPSTF n/a n/a

## 2020-02-12 NOTE — PROGRESS NOTES
HISTORY OF PRESENT ILLNESS  Bobby Bruce is a 81 y.o. female. HPI   Last here 8/22/19. Pt is here for routine care.      BP is 112/68   -141  Pt is on losartan 100 mg and hctz 25 mg now separate   She took this med this AM  Recall pt had a cough on lisinopril. She was taking this medication for 20 years.  Myesha Peng today is 187 lbs-- stable x lov   Pt states she prev lost 5 lbs but gained it back due to inactivity recently due to back pain   Discussed monitoring calories   Discussed diet and weight loss and [employer]    Reviewed labs 8/19   Will get labs today      Pt follows with Dr. Amada Burgos (GI)   Last visit was 8/17  Pt takes protonix only once daily now-- helps gerd/reflux  Not using zantac   Lov, pt c/o Feeling  constipated, increased gas and bloating   C/o this again today   Lov, provided info for Dr Amada Burgos   Pt did not scheduled   Will reprint info, advised pt to schedule      Recall she previously followed with Dr. Amada Burgos about her stomach and episodes of diarrhea. Her stomach pain has resolved, continues on prevacid for this. Previously took amitiza prn but no longer on this, had hemorrhoid surgery.  Doing well       Pt was referred to Dr. Macarena Chau (cardio) by Dr. Amada Burgos (GI)   Last visit was 11/3/17     Pt is taking estradiol      Pt is taking eye drops      Continues on zocor 40mg daily for cholesterol     Pt has a chronic cough   Uses flonase for this   Not needing allergy meds recently   Not using any inhalers     Pt c/o back spasms x October   States this is mostly around her L shoulder  She states they will wake her up out of her sleep   Has been taking motrin and tylenol for this   Last XR was in 2017   In 2017 pt was having pain for 1-2 mos, in thoracic area   Was given muscle relaxer   Pt states this pain feels different, feels more like spasms   States this is off and on   Happens about 3x a week   States this last about a few minutes   Will get XR   Will set up with PT     She states she has been getting numbness in her hands when she wakes up   Discussed this is likely from curling wrists during sleep   Ordered wrist splints     Has xanax to use prn but has not used this recently   Only took around the time of death in family       reviewed mammo , negative         Lives with her      Pt is functionally independent     No memory concerns   Knows the month, day, year   Can recall 3/3 objects       ACP not on file. SDM is her  Jacek Martell). Provided this info today.       PREVENTIVE:  Colonoscopy:  with Dr. Marcela Bhandari, 10 yr f/u  EGD: 18 with Dr. Marcela Bhandari  AAA: [facility]  Pap: Dr. Manjit Alaniz, , provided info for gyn   Mammogram: . Negative   DEXA: 18, mild osteopenia  Tdap:   Pneumovax: 2017  Zmysipa00: 18   Shingles: declines  Flu shot: declines    A1c: 10/16 6.4,  6.1,  6.0,  6.0,  6.0  6.0   Eye exam: Dr. Fredy Graham,    Hep C screen:  negative  Lipids:   LDL 77   EK/3/17, nsr with cardio         Patient Active Problem List    Diagnosis Date Noted    Essential hypertension with goal blood pressure less than 130/85 2016    Pure hypercholesterolemia 2016    Osteopenia 2012    Constipation 2011    Glaucoma 2011    Vertigo 2011    Headache(784.0) 2011    GERD - Esophagitis - reflux 2011     Current Outpatient Medications   Medication Sig Dispense Refill    losartan (COZAAR) 100 mg tablet Take 1 Tab by mouth daily. 90 Tab 0    hydroCHLOROthiazide (HYDRODIURIL) 25 mg tablet TAKE 1 TABLET BY MOUTH ONCE DAILY 30 Tab 0    simvastatin (ZOCOR) 40 mg tablet TAKE 1 TABLET BY MOUTH DAILY 90 Tab 0    cyclobenzaprine (FLEXERIL) 5 mg tablet Take 1 Tab by mouth three (3) times daily as needed for Muscle Spasm(s). 20 Tab 0    pantoprazole (PROTONIX) 40 mg tablet Take 1 Tab by mouth daily.  180 Tab 1    fluticasone propion-salmeterol (ADVAIR/WIXELA) 250-50 mcg/dose diskus inhaler Take 1 Puff by inhalation two (2) times a day. 1 Inhaler 1    ondansetron (ZOFRAN ODT) 4 mg disintegrating tablet Take 1 Tab by mouth every eight (8) hours as needed for Nausea. 30 Tab 1    ALPRAZolam (XANAX) 0.25 mg tablet Take 1 Tab by mouth two (2) times daily as needed for Anxiety. Max Daily Amount: 0.5 mg. 15 Tab 1    glycopyrrolate-formoterol (BEVESPI AEROSPHERE) 9-4.8 mcg HFAA Take 2 Puffs by inhalation daily. 1 Inhaler 0    albuterol (PROAIR HFA) 90 mcg/actuation inhaler Take 1 Puff by inhalation every four (4) hours as needed for Wheezing or Shortness of Breath. 1 Inhaler 1    fluticasone (FLOVENT HFA) 110 mcg/actuation inhaler Take 1 Puff by inhalation every twelve (12) hours. 1 Inhaler 0    fluticasone (FLONASE ALLERGY RELIEF) 50 mcg/actuation nasal spray 2 Sprays by Both Nostrils route daily.  latanoprost (XALATAN) 0.005 % ophthalmic solution Administer 1 Drop to both eyes nightly. 0    raNITIdine (ZANTAC) 150 mg tablet Take 150 mg by mouth two (2) times daily as needed.  polyethylene glycol (MIRALAX) 17 gram packet Take 17 g by mouth daily.        Past Surgical History:   Procedure Laterality Date    BREAST SURGERY PROCEDURE UNLISTED      COLONOSCOPY N/A 2/28/2018    COLONOSCOPY performed by Philip Haddad MD at hospitals ENDOSCOPY    HX BREAST BIOPSY Left 1980s    benign cyst    HX GI      hemmorhoidectomy    HX HYSTERECTOMY      HX OTHER SURGICAL      \"hemrrhoid surgery\"      Lab Results   Component Value Date/Time    WBC 6.0 08/22/2019 01:46 PM    HGB 11.9 08/22/2019 01:46 PM    HCT 35.7 08/22/2019 01:46 PM    PLATELET 216 85/16/2852 01:46 PM    MCV 90 08/22/2019 01:46 PM     Lab Results   Component Value Date/Time    Cholesterol, total 155 08/22/2019 01:46 PM    HDL Cholesterol 67 08/22/2019 01:46 PM    LDL, calculated 77 08/22/2019 01:46 PM    Triglyceride 55 08/22/2019 01:46 PM     Lab Results   Component Value Date/Time    GFR est non-AA 60 08/22/2019 01:46 PM    GFR est AA 70 08/22/2019 01:46 PM    Creatinine 0.98 08/22/2019 01:46 PM    BUN 16 08/22/2019 01:46 PM    Sodium 141 08/22/2019 01:46 PM    Potassium 4.1 08/22/2019 01:46 PM    Chloride 101 08/22/2019 01:46 PM    CO2 24 08/22/2019 01:46 PM    Magnesium 1.9 09/03/2014 10:55 AM        Review of Systems   Respiratory: Negative for shortness of breath. Cardiovascular: Negative for chest pain. Physical Exam  Constitutional:       General: She is not in acute distress. Appearance: She is well-developed. She is not diaphoretic. HENT:      Head: Normocephalic and atraumatic. Right Ear: Tympanic membrane normal. There is no impacted cerumen. Left Ear: Tympanic membrane normal. There is no impacted cerumen. Mouth/Throat:      Pharynx: No oropharyngeal exudate or posterior oropharyngeal erythema. Eyes:      General:         Right eye: No discharge. Left eye: No discharge. Conjunctiva/sclera: Conjunctivae normal.   Neck:      Musculoskeletal: Normal range of motion and neck supple. Cardiovascular:      Rate and Rhythm: Normal rate and regular rhythm. Heart sounds: Murmur (1/6) present. No friction rub. No gallop. Pulmonary:      Effort: Pulmonary effort is normal. No respiratory distress. Breath sounds: Normal breath sounds. No wheezing or rales. Chest:      Chest wall: No tenderness. Abdominal:      General: There is no distension. Palpations: There is no mass. Tenderness: There is no abdominal tenderness. Hernia: No hernia is present. Musculoskeletal: Normal range of motion. General: No tenderness or deformity. Comments: Pain over L scapula     5/5 strength BLUe   Lymphadenopathy:      Cervical: No cervical adenopathy. Skin:     General: Skin is warm and dry. Coloration: Skin is not pale. Findings: No erythema or rash. Neurological:      Mental Status: She is alert and oriented to person, place, and time.       Coordination: Coordination normal. Psychiatric:         Mood and Affect: Mood normal.         Behavior: Behavior normal.         ASSESSMENT and PLAN    ICD-10-CM ICD-9-CM    1. Essential hypertension with goal blood pressure less than 130/85              Controlled on hctz and losartan continue  F26 264.4 METABOLIC PANEL, COMPREHENSIVE      HEMOGLOBIN A1C WITH EAG   2. Medicare annual wellness visit, subsequent Z00.00 V70.0 REFERRAL TO OBSTETRICS AND GYNECOLOGY      METABOLIC PANEL, COMPREHENSIVE      HEMOGLOBIN A1C WITH EAG   3. GERD - Esophagitis - reflux                Controlled with protonix once daily not taking zantac currently still have bloating sxs radha have her f/u with dr Esthela Aguillon  L69.3 240.50 METABOLIC PANEL, COMPREHENSIVE      HEMOGLOBIN A1C WITH EAG   4. Osteopenia of multiple sites                  Vit d for tx repeat dexa will be due in 9/20  W33.34 075.49 METABOLIC PANEL, COMPREHENSIVE      HEMOGLOBIN A1C WITH EAG   5. Pure hypercholesterolemia                  Controlled on zocor  P72.09 007.2 METABOLIC PANEL, COMPREHENSIVE      HEMOGLOBIN A1C WITH EAG   6. Bloating                Refer to GI sxs not improved despite protonix and zantac in the past  L08.3 863.9 METABOLIC PANEL, COMPREHENSIVE      HEMOGLOBIN A1C WITH EAG   7. Obesity (BMI 30.0-34. 9)              Discussed need for portion control and wt loss wt stable from lov needs to work on wt loss  C83.1 632.85 METABOLIC PANEL, COMPREHENSIVE      HEMOGLOBIN A1C WITH EAG   8. IFG (impaired fasting glucose)                Check a1c today  L05.58 745.50 METABOLIC PANEL, COMPREHENSIVE      HEMOGLOBIN A1C WITH EAG   9. Cough            Overall improved using flonase not needing inhalers or allergy meds  R05 786.2    10. Thoracic spine pain                    Pt has L scapular pain into thoracic areas as well appears to be msk will check plain film give flexeril to use prn and refer to PT  M54.6 724.1 XR SCAPULA LT      REFERRAL TO PHYSICAL THERAPY   11.  Hand numbness            Likely from carpal tunnel occurs mostly after sleeping and curling her hands will give wrist splints  R20.0 782.0 XR SPINE CERV 4 OR 5 V      AMB SUPPLY ORDER   12. Chronic left shoulder pain                  See above  M25.512 719.41 XR SCAPULA LT    G89.29 338.29 REFERRAL TO PHYSICAL THERAPY          Depression screen reviewed and negative. Scribed by Yoko Arias of [address] [place] [address] [address], as dictated by Dr. David Cedeño. Current diagnosis and concerns discussed with pt at length. Pt understands risks and benefits or current treatment plan and medications, and accepts the treatment and medication with any possible risks. Pt asks appropriate questions, which were answered. Pt was instructed to call with any concerns or problems. I have reviewed the note documented by the scribe. The services provided are my own.   The documentation is accurate

## 2020-02-13 LAB
ALBUMIN SERPL-MCNC: 4.9 G/DL (ref 3.8–4.8)
ALBUMIN/GLOB SERPL: 2.1 {RATIO} (ref 1.2–2.2)
ALP SERPL-CCNC: 75 IU/L (ref 39–117)
ALT SERPL-CCNC: 12 IU/L (ref 0–32)
AST SERPL-CCNC: 17 IU/L (ref 0–40)
BILIRUB SERPL-MCNC: 0.3 MG/DL (ref 0–1.2)
BUN SERPL-MCNC: 14 MG/DL (ref 8–27)
BUN/CREAT SERPL: 18 (ref 12–28)
CALCIUM SERPL-MCNC: 10.2 MG/DL (ref 8.7–10.3)
CHLORIDE SERPL-SCNC: 101 MMOL/L (ref 96–106)
CO2 SERPL-SCNC: 25 MMOL/L (ref 20–29)
CREAT SERPL-MCNC: 0.79 MG/DL (ref 0.57–1)
EST. AVERAGE GLUCOSE BLD GHB EST-MCNC: 126 MG/DL
GLOBULIN SER CALC-MCNC: 2.3 G/DL (ref 1.5–4.5)
GLUCOSE SERPL-MCNC: 73 MG/DL (ref 65–99)
HBA1C MFR BLD: 6 % (ref 4.8–5.6)
POTASSIUM SERPL-SCNC: 4.2 MMOL/L (ref 3.5–5.2)
PROT SERPL-MCNC: 7.2 G/DL (ref 6–8.5)
SODIUM SERPL-SCNC: 144 MMOL/L (ref 134–144)

## 2020-02-28 RX ORDER — SIMVASTATIN 40 MG/1
TABLET, FILM COATED ORAL
Qty: 90 TAB | Refills: 0 | Status: SHIPPED | OUTPATIENT
Start: 2020-02-28 | End: 2020-11-16

## 2020-03-12 ENCOUNTER — APPOINTMENT (OUTPATIENT)
Dept: PHYSICAL THERAPY | Age: 70
End: 2020-03-12

## 2020-05-18 ENCOUNTER — VIRTUAL VISIT (OUTPATIENT)
Dept: INTERNAL MEDICINE CLINIC | Age: 70
End: 2020-05-18

## 2020-05-18 ENCOUNTER — HOSPITAL ENCOUNTER (OUTPATIENT)
Dept: ULTRASOUND IMAGING | Age: 70
Discharge: HOME | End: 2020-05-18
Attending: INTERNAL MEDICINE
Payer: MEDICARE

## 2020-05-18 ENCOUNTER — TELEPHONE (OUTPATIENT)
Dept: INTERNAL MEDICINE CLINIC | Age: 70
End: 2020-05-18

## 2020-05-18 DIAGNOSIS — M79.89 LEFT LEG SWELLING: ICD-10-CM

## 2020-05-18 DIAGNOSIS — M25.472 ANKLE SWELLING, LEFT: ICD-10-CM

## 2020-05-18 DIAGNOSIS — I10 ESSENTIAL HYPERTENSION WITH GOAL BLOOD PRESSURE LESS THAN 130/85: Primary | ICD-10-CM

## 2020-05-18 PROCEDURE — 93971 EXTREMITY STUDY: CPT

## 2020-05-18 RX ORDER — FUROSEMIDE 20 MG/1
20 TABLET ORAL DAILY
Qty: 14 TAB | Refills: 0 | Status: SHIPPED | OUTPATIENT
Start: 2020-05-18 | End: 2021-07-01

## 2020-05-18 NOTE — PROGRESS NOTES
HISTORY OF PRESENT ILLNESS  Enio Conway is a 81 y.o. female. HPI     This is an established visit completed with telemedicine was completed with video assist  the patient acknowledges and agrees to this method of visitation  wciho    Last here 2/20 Pt is here for routine care. Reports L ankle has been swollen for the last 8 days  Really the whole foot is swollen  No pain  No swelling in the right foot  Same all day long   Just swollen, cant wear her tennis shoes  Took some motrin      BP is 137/70    Pt is on losartan 100 mg and hctz 25 mg now separate   She took this med this AM  Recall pt had a cough on lisinopril. She was taking this medication for 20 years.                       Patient Active Problem List   Diagnosis Code    GERD - Esophagitis - reflux K20.9    Headache(784.0) R51    Constipation K59.00    Glaucoma H40.9    Vertigo R42    Osteopenia M85.80    Essential hypertension with goal blood pressure less than 130/85 I10    Pure hypercholesterolemia E78.00     Current Outpatient Medications   Medication Sig Dispense Refill    simvastatin (ZOCOR) 40 mg tablet TAKE 1 TABLET BY MOUTH DAILY 90 Tab 0    cyclobenzaprine (FLEXERIL) 5 mg tablet Take 1 Tab by mouth nightly as needed for Muscle Spasm(s). 30 Tab 0    hydroCHLOROthiazide (HYDRODIURIL) 25 mg tablet TAKE 1 TABLET BY MOUTH ONCE DAILY 90 Tab 1    losartan (COZAAR) 100 mg tablet Take 1 Tab by mouth daily. 90 Tab 1    pantoprazole (PROTONIX) 40 mg tablet Take 1 Tab by mouth daily. 180 Tab 1    albuterol (PROAIR HFA) 90 mcg/actuation inhaler Take 1 Puff by inhalation every four (4) hours as needed for Wheezing or Shortness of Breath. 1 Inhaler 1    fluticasone (FLONASE ALLERGY RELIEF) 50 mcg/actuation nasal spray 2 Sprays by Both Nostrils route daily.  polyethylene glycol (MIRALAX) 17 gram packet Take 17 g by mouth daily.         Lab Results   Component Value Date/Time    GFR est non-AA 78 02/12/2020 03:47 PM    GFR est AA 90 02/12/2020 03:47 PM    Creatinine 0.79 02/12/2020 03:47 PM    BUN 14 02/12/2020 03:47 PM    Sodium 144 02/12/2020 03:47 PM    Potassium 4.2 02/12/2020 03:47 PM    Chloride 101 02/12/2020 03:47 PM    CO2 25 02/12/2020 03:47 PM    Magnesium 1.9 09/03/2014 10:55 AM        Review of Systems   Respiratory: Negative for shortness of breath. Cardiovascular: Negative for chest pain. Physical Exam  Constitutional:       General: She is not in acute distress. Appearance: Normal appearance. She is not ill-appearing, toxic-appearing or diaphoretic. HENT:      Head: Normocephalic and atraumatic. Eyes:      General:         Right eye: No discharge. Left eye: No discharge. Conjunctiva/sclera: Conjunctivae normal.   Musculoskeletal:         General: Swelling present. Left lower leg: Edema (2+ pitting) present. Neurological:      General: No focal deficit present. Mental Status: She is alert and oriented to person, place, and time. Psychiatric:         Mood and Affect: Mood normal.         Behavior: Behavior normal.         ASSESSMENT and PLAN    ICD-10-CM ICD-9-CM    1. Essential hypertension with goal blood pressure less than 130/85    Patient reports adequate control on hydrochlorothiazide and losartan continue I10 401.9    2. Ankle swelling, left    Patient has significant swelling in her left ankle and leg will need to rule out DVT stat duplex has been ordered    We will give Lasix to help with her symptoms for the next week if she needs to continue on Lasix longer will need to monitor creatinine and potassium levels M25.472 719.07 DUPLEX LOWER EXT VENOUS LEFT   3. Left leg swelling    See above M79.89 729.81 DUPLEX LOWER EXT VENOUS LEFT       Austyn Yang is a 81 y.o. female being evaluated by a Virtual Visit (video visit) encounter to address concerns as mentioned above. A caregiver was present when appropriate.  Due to this being a TeleHealth encounter (During [facility] public health emergency), evaluation of the following organ systems was limited: Vitals/Constitutional/EENT/Resp/CV/GI//MS/Neuro/Skin/Heme-Lymph-Imm. Pursuant to the emergency declaration under the [address] [place] [address], [address] [place] [address] authority and the [employer] Act, this Virtual Visit was conducted with patient's (and/or legal guardian's) consent, to reduce the risk of exposure to COVID-19 and provide necessary medical care. Services were provided through a video synchronous discussion virtually to substitute for in-person encounter. --Yuriy Rodarte MD on 5/18/2020 at 3:06 PM    An electronic signature was used to authenticate this note.

## 2020-05-18 NOTE — TELEPHONE ENCOUNTER
----- Message from Rickey Hoskins sent at 5/18/2020 12:32 PM EDT -----  Regarding: Ploina Thompson MD  General Message/Vendor Calls    Caller's first and last Trip Morgan [[id number]]      Reason for call: Foot Swollen       Callback required yes/no and why: Yes      Best contact number(s): [phone]      Details to clarify the request: left foot swollen for 2wks.       Robin Reaves      Copy/paste envera

## 2020-05-18 NOTE — TELEPHONE ENCOUNTER
Called, spoke to pt. Two pt identifiers confirmed. Pt offered/accepted Virtual appt for 5/18/20 at 1445. Informed pt that it will be billed under insurance and pt may expect a co-pay. Informed that pt must have access to a smartphone and/or a computer w/ a camera and a microphone. Informed pt that a PSR may contact pt roughly 15 minutes prior to [facility] for check-in. Pt verbalized understanding of information discussed w/ no further questions at this time.

## 2020-05-29 ENCOUNTER — VIRTUAL VISIT (OUTPATIENT)
Dept: INTERNAL MEDICINE CLINIC | Age: 70
End: 2020-05-29

## 2020-05-29 ENCOUNTER — TELEPHONE (OUTPATIENT)
Dept: INTERNAL MEDICINE CLINIC | Age: 70
End: 2020-05-29

## 2020-05-29 DIAGNOSIS — R60.0 LOCALIZED EDEMA: Primary | ICD-10-CM

## 2020-05-29 DIAGNOSIS — K20.90 ESOPHAGITIS, UNSPECIFIED: ICD-10-CM

## 2020-05-29 DIAGNOSIS — I10 ESSENTIAL HYPERTENSION WITH GOAL BLOOD PRESSURE LESS THAN 130/85: ICD-10-CM

## 2020-05-29 DIAGNOSIS — E66.9 OBESITY (BMI 30.0-34.9): ICD-10-CM

## 2020-05-29 DIAGNOSIS — R73.01 IFG (IMPAIRED FASTING GLUCOSE): ICD-10-CM

## 2020-05-29 DIAGNOSIS — M85.89 OSTEOPENIA OF MULTIPLE SITES: ICD-10-CM

## 2020-05-29 DIAGNOSIS — E78.00 PURE HYPERCHOLESTEROLEMIA: ICD-10-CM

## 2020-05-29 NOTE — PROGRESS NOTES
HISTORY OF PRESENT ILLNESS  Christiano Kim is a 81 y.o. female. HPI         This is an established visit completed with telemedicine was completed with video assist  the patient acknowledges and agrees to this method of visitation  wicho     Last here 5/20 Pt is here for routine care.        Reports L ankle has been swollen for the last 8 days--at last visit--now it was been since around may 11th   Really the whole foot is swollen. Started her on lasix last ov which helped a little bit   Now swelling is localized in her foot  Mild pain when she pushes on the top of her foot. Had duplex--reviewed negative         BP is 128/70 at home    Yesterday 132/72      Pt is on losartan 100 mg and hctz 25 mg now separate     Recall pt had a cough on lisinopril. She was taking this medication for 20 years. She is almost out of her lasix          Wt was 187 lbs-- now around 190lb at home  Not walkiing as much   Pt states she prev lost 5 lbs but gained it back due to inactivity recently due to back pain   Discussed monitoring calories   Discussed diet and weight loss and [employer]    Reviewed labs      Pt follows with Dr. Leatha Epley (GI)   Last visit was 8/17  Pt takes protonix only once daily now-- helps gerd/reflux  C/o this again today --she scheduled for 3/20 but it was pushed back back d/t corona          Recall she previously followed with Dr. Leatha Epley about her stomach and episodes of diarrhea. Her stomach pain has resolved, continues on prevacid for this. Previously took amitiza prn but no longer on this, had hemorrhoid surgery.  Doing well       Pt was referred to Dr. Deniz Ho (cardio) by Dr. Leatha Epley (GI)   Last visit was 11/3/17     Pt is taking estradiol            Continues on zocor 40mg daily for cholesterol      Pt has a chronic cough   Uses flonase for this   And using zyrtec  Not using any inhalers                Has xanax to use prn but has not used this recently   Only took around the time of death in family           ACP not on file. SDM is her  Jacqui Huerta). Provided this info      PREVENTIVE:  Colonoscopy:  with Dr. Piyush Rosado, 10 yr f/u  EGD: 18 with Dr. Piyush Rosado  AAA: [place] [facility] [facility]  Pap: Dr. Araujo , , provided info for gyn   Mammogram: . Negative   DEXA: 18, mild osteopenia  Tdap:   Pneumovax: 2017  Zgxgyuz34: 18   Shingles: declines  Flu shot: declines    A1c: 10/16 6.4,  6.1,  6.0,  6.0,  6.0  6.0   Eye exam: Dr. Carley Polanco,    Hep C screen:  negative  Lipids:   LDL 77   EK/3/17, nsr with cardio                Patient Active Problem List   Diagnosis Code    GERD - Esophagitis - reflux K20.9    Headache(784.0) R51    Constipation K59.00    Glaucoma H40.9    Vertigo R42    Osteopenia M85.80    Essential hypertension with goal blood pressure less than 130/85 I10    Pure hypercholesterolemia E78.00     Current Outpatient Medications   Medication Sig Dispense Refill    furosemide (LASIX) 20 mg tablet Take 1 Tab by mouth daily. 14 Tab 0    simvastatin (ZOCOR) 40 mg tablet TAKE 1 TABLET BY MOUTH DAILY 90 Tab 0    cyclobenzaprine (FLEXERIL) 5 mg tablet Take 1 Tab by mouth nightly as needed for Muscle Spasm(s). 30 Tab 0    hydroCHLOROthiazide (HYDRODIURIL) 25 mg tablet TAKE 1 TABLET BY MOUTH ONCE DAILY 90 Tab 1    losartan (COZAAR) 100 mg tablet Take 1 Tab by mouth daily. 90 Tab 1    pantoprazole (PROTONIX) 40 mg tablet Take 1 Tab by mouth daily. 180 Tab 1    albuterol (PROAIR HFA) 90 mcg/actuation inhaler Take 1 Puff by inhalation every four (4) hours as needed for Wheezing or Shortness of Breath. 1 Inhaler 1    fluticasone (FLONASE ALLERGY RELIEF) 50 mcg/actuation nasal spray 2 Sprays by Both Nostrils route daily.  polyethylene glycol (MIRALAX) 17 gram packet Take 17 g by mouth daily.         Lab Results   Component Value Date/Time    GFR est non-AA 78 2020 03:47 PM    GFR est AA 90 2020 03:47 PM    Creatinine 0.79 2020 03:47 PM    BUN 14 02/12/2020 03:47 PM    Sodium 144 02/12/2020 03:47 PM    Potassium 4.2 02/12/2020 03:47 PM    Chloride 101 02/12/2020 03:47 PM    CO2 25 02/12/2020 03:47 PM    Magnesium 1.9 09/03/2014 10:55 AM     Lab Results   Component Value Date/Time    TSH 2.050 08/22/2019 01:46 PM         Review of Systems   Respiratory: Negative for shortness of breath. Cardiovascular: Negative for chest pain. Physical Exam  Constitutional:       General: She is not in acute distress. Appearance: Normal appearance. She is not ill-appearing, toxic-appearing or diaphoretic. HENT:      Head: Normocephalic and atraumatic. Eyes:      General:         Right eye: No discharge. Left eye: No discharge. Conjunctiva/sclera: Conjunctivae normal.   Musculoskeletal:         General: Swelling present. Right lower leg: Edema present. Left lower leg: Edema present. Neurological:      General: No focal deficit present. Mental Status: She is alert and oriented to person, place, and time. Psychiatric:         Mood and Affect: Mood normal.         Behavior: Behavior normal.         ASSESSMENT and PLAN    ICD-10-CM ICD-9-CM    1. Localized edema        The patient had a duplex that was negative for DVT her edema is overall improving    The swelling is localized to her foot she may have actually had a sprain which was the cause    She has some mild pain on the dorsal aspect of her foot    She is able to bear weight without problem    No imaging is needed at this time    We will stop Lasix will have her work on leg elevation and good shoe support R60.0 782.3 LIPID PANEL      METABOLIC PANEL, COMPREHENSIVE      CBC W/O DIFF      TSH 3RD GENERATION      HEMOGLOBIN A1C WITH EAG   2.  Essential hypertension with goal blood pressure less than 130/85  Controlled on losartan  And hydrochlorothiazide continue I10 401.9 LIPID PANEL      METABOLIC PANEL, COMPREHENSIVE      CBC W/O DIFF      TSH 3RD GENERATION HEMOGLOBIN A1C WITH EAG   3. GERD - Esophagitis - reflux  Improved with Protonix continue     K20.9 530.10 LIPID PANEL      METABOLIC PANEL, COMPREHENSIVE      CBC W/O DIFF      TSH 3RD GENERATION      HEMOGLOBIN A1C WITH EAG   4. Pure hypercholesterolemia    Check lipids controlled on Zocor adjust dose as needed E78.00 272.0 LIPID PANEL      METABOLIC PANEL, COMPREHENSIVE      CBC W/O DIFF      TSH 3RD GENERATION      HEMOGLOBIN A1C WITH EAG   5. IFG (impaired fasting glucose)      Monitor A1c work on weight loss Monitor A1c work on weight loss R73.01 790.21 LIPID PANEL      METABOLIC PANEL, COMPREHENSIVE      CBC W/O DIFF      TSH 3RD GENERATION      HEMOGLOBIN A1C WITH EAG   6. Obesity (BMI 30.0-34.9) patient has recently gained some weight will focus more aggressively on portion control     E66.9 278.00 LIPID PANEL      METABOLIC PANEL, COMPREHENSIVE      CBC W/O DIFF      TSH 3RD GENERATION      HEMOGLOBIN A1C WITH EAG   7. Osteopenia of multiple sites M85.89 733.90 LIPID PANEL   Vitamin D for treatment monitor levels         METABOLIC PANEL, COMPREHENSIVE      CBC W/O DIFF      TSH 3RD GENERATION      HEMOGLOBIN A1C WITH EAG       Gene Casanova is a 81 y.o. female being evaluated by a Virtual Visit (video visit) encounter to address concerns as mentioned above. A caregiver was present when appropriate. Due to this being a TeleHealth encounter (During [facility] public health emergency), evaluation of the following organ systems was limited: Vitals/Constitutional/EENT/Resp/CV/GI//MS/Neuro/Skin/Heme-Lymph-Imm. Pursuant to the emergency declaration under the [facility], [address] [place] [address] authority and the [employer] and [employer] Act, this Virtual Visit was conducted with patient's (and/or legal guardian's) consent, to reduce the risk of exposure to COVID-19 and provide necessary medical care.       Services were provided through a video synchronous discussion virtually to substitute for in-person encounter. --Delano Bejarano MD on 5/29/2020 at 3:13 PM    An electronic signature was used to authenticate this note.

## 2020-05-29 NOTE — TELEPHONE ENCOUNTER
#[phone] pt states her foot is still swollen. She has one fluid pill left and needs to know what to do? She had the US and there was no clot. Please call to advise.  thanks

## 2020-05-29 NOTE — TELEPHONE ENCOUNTER
Called and spoke with patient  two patient identifiers were confirmed (NAME/). Pt still has pedal edema times 10 days on fluid pill and has been elevated in little resolve. Pt given an apt today at 1500 with Dr Jacob Felipe.   Pt educated and understands

## 2020-06-04 ENCOUNTER — VIRTUAL VISIT (OUTPATIENT)
Dept: INTERNAL MEDICINE CLINIC | Age: 70
End: 2020-06-04

## 2020-06-04 DIAGNOSIS — R60.0 LEG EDEMA, LEFT: Primary | ICD-10-CM

## 2020-06-04 DIAGNOSIS — L03.116 CELLULITIS OF LEFT LOWER EXTREMITY: ICD-10-CM

## 2020-06-04 RX ORDER — CEPHALEXIN 500 MG/1
500 CAPSULE ORAL 4 TIMES DAILY
Qty: 40 CAP | Refills: 0 | Status: SHIPPED | OUTPATIENT
Start: 2020-06-04 | End: 2020-06-14

## 2020-06-04 RX ORDER — TRIAMCINOLONE ACETONIDE 1 MG/G
CREAM TOPICAL 2 TIMES DAILY
Qty: 30 G | Refills: 0 | Status: SHIPPED | OUTPATIENT
Start: 2020-06-04 | End: 2021-07-01

## 2020-06-04 NOTE — TELEPHONE ENCOUNTER
Called, spoke to pt. Two pt identifiers confirmed. Patient stated the swelling happened 2 weeks ago  Which is better now but now her foot is itching and there is a dark spot/ welp from where she has been scratching the area. Patient stated that she brushed a spider off her clothes but not sure if it bit her. Patients wants to know if she needs a  T-shot? Informed I would pass on this information to Dr Jean Marie Cheema, she want the patient to make a VV appointment. Pt verbalized understanding of information discussed w/ no further questions at this time.

## 2020-06-04 NOTE — PROGRESS NOTES
HISTORY OF PRESENT ILLNESS  Jeanette Dotson is a 81 y.o. female. HPI       This is an established visit completed with telemedicine was completed with video assist  the patient acknowledges and agrees to this method of visitation  doxyme     Last here 5/29 Pt is here for routine care.        Reports L ankle has been swollen for the last 8 days--at last visit--now it was been since around may 11th   Really the whole foot is swollen. Started her on lasix last ov which helped a little bit   Now swelling is localized in her foot on the top of the left foot  Today she has more rednes sin her left foot and itching in the skin  That just started yesterday    put alcohol on it      Had duplex--negative                       Patient Active Problem List   Diagnosis Code    GERD - Esophagitis - reflux K20.9    Headache(784.0) R51    Constipation K59.00    Glaucoma H40.9    Vertigo R42    Osteopenia M85.80    Essential hypertension with goal blood pressure less than 130/85 I10    Pure hypercholesterolemia E78.00     Current Outpatient Medications   Medication Sig Dispense Refill    furosemide (LASIX) 20 mg tablet Take 1 Tab by mouth daily. 14 Tab 0    simvastatin (ZOCOR) 40 mg tablet TAKE 1 TABLET BY MOUTH DAILY 90 Tab 0    cyclobenzaprine (FLEXERIL) 5 mg tablet Take 1 Tab by mouth nightly as needed for Muscle Spasm(s). 30 Tab 0    hydroCHLOROthiazide (HYDRODIURIL) 25 mg tablet TAKE 1 TABLET BY MOUTH ONCE DAILY 90 Tab 1    losartan (COZAAR) 100 mg tablet Take 1 Tab by mouth daily. 90 Tab 1    pantoprazole (PROTONIX) 40 mg tablet Take 1 Tab by mouth daily. 180 Tab 1    albuterol (PROAIR HFA) 90 mcg/actuation inhaler Take 1 Puff by inhalation every four (4) hours as needed for Wheezing or Shortness of Breath. 1 Inhaler 1    fluticasone (FLONASE ALLERGY RELIEF) 50 mcg/actuation nasal spray 2 Sprays by Both Nostrils route daily.  polyethylene glycol (MIRALAX) 17 gram packet Take 17 g by mouth daily.         Lab Results   Component Value Date/Time    GFR est non-AA 78 02/12/2020 03:47 PM    GFR est AA 90 02/12/2020 03:47 PM    Creatinine 0.79 02/12/2020 03:47 PM    BUN 14 02/12/2020 03:47 PM    Sodium 144 02/12/2020 03:47 PM    Potassium 4.2 02/12/2020 03:47 PM    Chloride 101 02/12/2020 03:47 PM    CO2 25 02/12/2020 03:47 PM    Magnesium 1.9 09/03/2014 10:55 AM     Lab Results   Component Value Date/Time    TSH 2.050 08/22/2019 01:46 PM         Review of Systems   Constitutional: Negative for chills and fever. Respiratory: Negative for shortness of breath. Cardiovascular: Negative for chest pain. Physical Exam  Constitutional:       General: She is not in acute distress. Appearance: Normal appearance. She is not ill-appearing, toxic-appearing or diaphoretic. HENT:      Head: Normocephalic and atraumatic. Eyes:      General:         Right eye: No discharge. Left eye: No discharge. Conjunctiva/sclera: Conjunctivae normal.   Skin:     Comments: Edema both feet    L slightly more promeint    Blistering skin on top of left foot    Mild ttp when pt palpates foot--tho improved from before    Mild erythema left foot   Neurological:      General: No focal deficit present. Mental Status: She is alert and oriented to person, place, and time. Psychiatric:         Mood and Affect: Mood normal.         Behavior: Behavior normal.         ASSESSMENT and PLAN    ICD-10-CM ICD-9-CM    1. Leg edema, left    Overall improving the edema is is primarily in her foot    Edema is mild she has Lasix to use as needed but is not taking this daily any further R60.0 782.3    2.  Cellulitis of left lower extremity    Concern for mild cellulitis in her left foot there is more erythema on the left side compared to the right  in addition to mild pain      We will treat with Keflex 4 times daily continued leg elevation    Triamcinolone for itch    I do not see any evidence of insect bite L03.116 682.6          [place] Query Jessy Renteria is a 81 y.o. female who was evaluated by an audio-video encounter for concerns as above. Patient identification was verified prior to start of the visit. A caregiver was present when appropriate. Due to this being a TeleHealth encounter (During KXQXF-65 public health emergency), evaluation of the following organ systems was limited: Vitals/Constitutional/EENT/Resp/CV/GI//MS/Neuro/Skin/Heme-Lymph-Imm. Pursuant to the emergency declaration under the [address] [place] [address], [facility] waiver authority and the [employer] Act, this Virtual Visit was conducted, with patient's (and/or legal guardian's) consent, to reduce the patient's risk of exposure to COVID-19 and provide necessary medical care. Services were provided through a synchronous discussion virtually to substitute for in-person clinic visit. I was at home. The patient was at home.

## 2020-06-04 NOTE — TELEPHONE ENCOUNTER
MD Ashlyn Mcmanus LPN   Caller: Unspecified (6 days ago, 11:15 AM)             Continue to monitor     Should improve w time as discussed recently

## 2020-06-04 NOTE — TELEPHONE ENCOUNTER
Patient's swelling has gone down in her foot but it looks as though she was bit. There was no answer when calling back line . Please call.

## 2020-06-04 NOTE — TELEPHONE ENCOUNTER
Called, spoke to pt. Two pt identifiers confirmed. Pt offered/accepted Virtual appt for 6/4/2020 at 1345. Informed pt that it will be billed under insurance and pt may expect a co-pay. Informed that pt must have access to a smartphone and/or a computer w/ a camera and a microphone. Informed pt that a PSR may contact pt roughly 15 minutes prior to Brittny Hu 1237 for check-in. Pt verbalized understanding of information discussed w/ no further questions at this time.

## 2020-06-06 LAB
ALBUMIN SERPL-MCNC: 5 G/DL (ref 3.8–4.8)
ALBUMIN/GLOB SERPL: 2.1 {RATIO} (ref 1.2–2.2)
ALP SERPL-CCNC: 77 IU/L (ref 39–117)
ALT SERPL-CCNC: 11 IU/L (ref 0–32)
AST SERPL-CCNC: 16 IU/L (ref 0–40)
BILIRUB SERPL-MCNC: 0.3 MG/DL (ref 0–1.2)
BUN SERPL-MCNC: 16 MG/DL (ref 8–27)
BUN/CREAT SERPL: 16 (ref 12–28)
CALCIUM SERPL-MCNC: 10.3 MG/DL (ref 8.7–10.3)
CHLORIDE SERPL-SCNC: 100 MMOL/L (ref 96–106)
CHOLEST SERPL-MCNC: 175 MG/DL (ref 100–199)
CO2 SERPL-SCNC: 23 MMOL/L (ref 20–29)
CREAT SERPL-MCNC: 0.99 MG/DL (ref 0.57–1)
ERYTHROCYTE [DISTWIDTH] IN BLOOD BY AUTOMATED COUNT: 13.4 % (ref 11.7–15.4)
EST. AVERAGE GLUCOSE BLD GHB EST-MCNC: 131 MG/DL
GLOBULIN SER CALC-MCNC: 2.4 G/DL (ref 1.5–4.5)
GLUCOSE SERPL-MCNC: 87 MG/DL (ref 65–99)
HBA1C MFR BLD: 6.2 % (ref 4.8–5.6)
HCT VFR BLD AUTO: 38.4 % (ref 34–46.6)
HDLC SERPL-MCNC: 62 MG/DL
HGB BLD-MCNC: 12.6 G/DL (ref 11.1–15.9)
LDLC SERPL CALC-MCNC: 90 MG/DL (ref 0–99)
MCH RBC QN AUTO: 30.6 PG (ref 26.6–33)
MCHC RBC AUTO-ENTMCNC: 32.8 G/DL (ref 31.5–35.7)
MCV RBC AUTO: 93 FL (ref 79–97)
PLATELET # BLD AUTO: 289 X10E3/UL (ref 150–450)
POTASSIUM SERPL-SCNC: 4.4 MMOL/L (ref 3.5–5.2)
PROT SERPL-MCNC: 7.4 G/DL (ref 6–8.5)
RBC # BLD AUTO: 4.12 X10E6/UL (ref 3.77–5.28)
SODIUM SERPL-SCNC: 141 MMOL/L (ref 134–144)
TRIGL SERPL-MCNC: 116 MG/DL (ref 0–149)
TSH SERPL DL<=0.005 MIU/L-ACNC: 4.01 UIU/ML (ref 0.45–4.5)
VLDLC SERPL CALC-MCNC: 23 MG/DL (ref 5–40)
WBC # BLD AUTO: 5 X10E3/UL (ref 3.4–10.8)

## 2020-11-10 ENCOUNTER — HOSPITAL ENCOUNTER (OUTPATIENT)
Dept: MAMMOGRAPHY | Age: 70
Discharge: HOME | End: 2020-11-10
Attending: INTERNAL MEDICINE
Payer: MEDICARE

## 2020-11-10 DIAGNOSIS — Z12.31 VISIT FOR SCREENING MAMMOGRAM: ICD-10-CM

## 2020-11-10 PROCEDURE — 77067 SCR MAMMO BI INCL CAD: CPT

## 2020-11-16 RX ORDER — SIMVASTATIN 40 MG/1
TABLET, FILM COATED ORAL
Qty: 90 TAB | Refills: 0 | Status: SHIPPED | OUTPATIENT
Start: 2020-11-16 | End: 2021-12-08 | Stop reason: SDUPTHER

## 2020-11-16 RX ORDER — PANTOPRAZOLE SODIUM 40 MG/1
TABLET, DELAYED RELEASE ORAL
Qty: 180 TAB | Refills: 1 | Status: SHIPPED | OUTPATIENT
Start: 2020-11-16 | End: 2021-10-31

## 2020-11-16 RX ORDER — LOSARTAN POTASSIUM 100 MG/1
TABLET ORAL
Qty: 90 TAB | Refills: 0 | Status: SHIPPED | OUTPATIENT
Start: 2020-11-16 | End: 2021-05-04

## 2020-11-16 RX ORDER — SIMVASTATIN 40 MG/1
TABLET, FILM COATED ORAL
Qty: 90 TAB | Refills: 0 | Status: SHIPPED | OUTPATIENT
Start: 2020-11-16 | End: 2021-02-03

## 2020-11-24 RX ORDER — HYDROCHLOROTHIAZIDE 25 MG/1
TABLET ORAL
Qty: 30 TAB | Refills: 0 | Status: SHIPPED | OUTPATIENT
Start: 2020-11-24 | End: 2021-02-03

## 2020-12-07 NOTE — PROGRESS NOTES
HISTORY OF PRESENT ILLNESS  Shaheed Betts is a 78 y.o. female. HPI     Last here vv 6/4/20 Pt is here for routine care.     Lov, c/o leg edema   This improved, no longer taking lasix    Prev  c/o back spasms 2/20  She c/o this again today   It will occur a couple times a month, last for a minute -- sporadic   Will check labs  States this is mostly around her L shoulder  Reviewed xr scapula 2/12/20 Limited radiographic examination of the left scapula as described  Above. Reviewed xr c spine 2/12/20: Mild to moderate C5-6 and C6-7 spondylosis  Will give muscle relaxer   Discussed following up with physical therapy - ordered      BP today is 131/71  BP at home 130/70  Pt is on losartan 100 mg and hctz 25 mg now separate -- took these today  Recall pt had a cough on lisinopril. She was taking this medication for 20 years.     Wt is 188 lbs - down 2 lbs x lov     Not walkiing as much   Discussed portions/ww     Reviewed labs   Ordered labs      Pt follows with Dr. Stephanie Conner (GI)   Last visit was 8/17  Pt takes protonix only once daily now-- helps gerd/reflux  Discussed f/u for egd   Recall she previously followed with Dr. Stephanie Conner about her stomach and episodes of diarrhea. Her stomach pain has resolved, continues on prevacid for this. Previously took amitiza prn but no longer on this, had hemorrhoid surgery. Doing well       Pt was referred to Dr. Jhonatan Minor (cardio) by Dr. Stephanie Conner (GI)   Last visit was 11/3/17     Pt is taking estradiol     Continues on zocor 40mg daily for cholesterol      Pt has a chronic cough   Uses flonase and zyrtec for this   Not using any inhalers      Has xanax to use prn but has not used this recently   Only took around the time of death in [facility] 879 11/10/20 negative    Murmur heard on exam  Last echo 2017: mild leaky valves  Will repeat, ordered      ACP not on file. SDM is her  Belle Trevino).   Provided this info      PREVENTIVE:  Colonoscopy: 2/18 with Dr. Stephanie Conner, 10 yr f/u  EGD: 18 with Dr. Mckenzie Ibarra, discussed f/u   AAA: [facility]  Pap: Dr. Jamila Mark, , provided info for gyn due reminded  Mammogram:11/10/20 negative  DEXA: 18, mild osteopenia, due ordered   Tdap:   Pneumovax: 2017  Flyvyoo39: 18   Shingles: declines  Flu shot: declines    A1c: 10/16 6.4,  6.1,  6.0,  6.0,  6.0  6.0  6.2  Eye exam: Dr. Nathan Hansen, apt scheduled end of   Hep C screen:  negative  Lipids:  LDL 90  EK/3/17, nsr with cardio    Patient Active Problem List    Diagnosis Date Noted    Essential hypertension with goal blood pressure less than 130/85 2016    Pure hypercholesterolemia 2016    Osteopenia 2012    Constipation 2011    Glaucoma 2011    Vertigo 2011    Headache(784.0) 2011    Esophagitis 2011     Current Outpatient Medications   Medication Sig Dispense Refill    cyclobenzaprine (FLEXERIL) 5 mg tablet Take 1 Tab by mouth nightly as needed for Muscle Spasm(s). 30 Tab 0    hydroCHLOROthiazide (HYDRODIURIL) 25 mg tablet TAKE 1 TABLET BY MOUTH EVERY DAY 30 Tab 0    losartan (COZAAR) 100 mg tablet TAKE 1 TABLET BY MOUTH DAILY 90 Tab 0    pantoprazole (PROTONIX) 40 mg tablet TAKE 1 TABLET BY MOUTH ONCE DAILY. 180 Tab 1    simvastatin (ZOCOR) 40 mg tablet TAKE 1 TABLET BY MOUTH DAILY 90 Tab 0    simvastatin (ZOCOR) 40 mg tablet TAKE 1 TABLET BY MOUTH DAILY 90 Tab 0    triamcinolone acetonide (KENALOG) 0.1 % topical cream Apply  to affected area two (2) times a day. use thin layer 30 g 0    furosemide (LASIX) 20 mg tablet Take 1 Tab by mouth daily. 14 Tab 0    albuterol (PROAIR HFA) 90 mcg/actuation inhaler Take 1 Puff by inhalation every four (4) hours as needed for Wheezing or Shortness of Breath. 1 Inhaler 1    fluticasone (FLONASE ALLERGY RELIEF) 50 mcg/actuation nasal spray 2 Sprays by Both Nostrils route daily.       polyethylene glycol (MIRALAX) 17 gram packet Take 17 g by mouth daily. Past Surgical History:   Procedure Laterality Date    BREAST SURGERY PROCEDURE UNLISTED      COLONOSCOPY N/A 2/28/2018    COLONOSCOPY performed by [address], MD at [facility] ENDOSCOPY    HX BREAST BIOPSY Left 1980s    benign cyst    HX GI      hemmorhoidectomy    HX HYSTERECTOMY      HX OTHER SURGICAL      \"hemrrhoid surgery\"      Lab Results   Component Value Date/Time    WBC 5.0 06/05/2020 12:42 PM    HGB 12.6 06/05/2020 12:42 PM    HCT 38.4 06/05/2020 12:42 PM    PLATELET 628 72/57/3940 12:42 PM    MCV 93 06/05/2020 12:42 PM     Lab Results   Component Value Date/Time    Cholesterol, total 175 06/05/2020 12:42 PM    HDL Cholesterol 62 06/05/2020 12:42 PM    LDL, calculated 90 06/05/2020 12:42 PM    Triglyceride 116 06/05/2020 12:42 PM     Lab Results   Component Value Date/Time    GFR est non-AA 59 (L) 06/05/2020 12:42 PM    GFR est AA 68 06/05/2020 12:42 PM    Creatinine 0.99 06/05/2020 12:42 PM    BUN 16 06/05/2020 12:42 PM    Sodium 141 06/05/2020 12:42 PM    Potassium 4.4 06/05/2020 12:42 PM    Chloride 100 06/05/2020 12:42 PM    CO2 23 06/05/2020 12:42 PM    Magnesium 1.9 09/03/2014 10:55 AM        Review of Systems   Respiratory: Negative for shortness of breath. Cardiovascular: Negative for chest pain. Physical Exam  Constitutional:       General: She is not in acute distress. Appearance: Normal appearance. She is not ill-appearing, toxic-appearing or diaphoretic. HENT:      Head: Normocephalic and atraumatic. Right Ear: External ear normal.      Left Ear: External ear normal.   Eyes:      General:         Right eye: No discharge. Left eye: No discharge. Conjunctiva/sclera: Conjunctivae normal.      Pupils: Pupils are equal, round, and reactive to light. Neck:      Musculoskeletal: Normal range of motion and neck supple. Cardiovascular:      Rate and Rhythm: Normal rate and regular rhythm. Pulses: Normal pulses.       Heart sounds: Murmur (2/6) present. No friction rub. No gallop. Pulmonary:      Effort: No respiratory distress. Breath sounds: Normal breath sounds. No wheezing or rales. Chest:      Chest wall: No tenderness. Musculoskeletal: Normal range of motion. General: No tenderness. Right lower leg: No edema. Left lower leg: No edema. Skin:     General: Skin is warm and dry. Neurological:      Mental Status: She is alert and oriented to person, place, and time. Mental status is at baseline. Coordination: Coordination normal.      Gait: Gait normal.   Psychiatric:         Mood and Affect: Mood normal.         Behavior: Behavior normal.         ASSESSMENT and PLAN    ICD-10-CM ICD-9-CM    1. Essential hypertension with goal blood pressure less than 130/85         Controlled on losartan hydrochlorothiazide continue no change dose   L59 109.3 METABOLIC PANEL, BASIC      HEMOGLOBIN A1C WITH EAG      CK      TSH 3RD GENERATION   2. Esophagitis  J49.26 812.88 METABOLIC PANEL, BASIC      HEMOGLOBIN A1C WITH EAG   Has Protonix to use as needed needs to reschedule gastroenterology   CK      TSH 3RD GENERATION   3. Pure hypercholesterolemia  H55.00 319.6 METABOLIC PANEL, BASIC      HEMOGLOBIN A1C WITH EAG   Controlled on Zocor   CK      TSH 3RD GENERATION   4. Osteopenia of multiple sites  M83.60 460.86 METABOLIC PANEL, BASIC      HEMOGLOBIN A1C WITH EAG   Due for DEXA vitamin D for treatment DEXA ordered   CK      TSH 3RD GENERATION      DEXA BONE DENSITY STUDY AXIAL   5. Localized edema  G48.4 801.6 METABOLIC PANEL, BASIC      HEMOGLOBIN A1C WITH EAG   Much improved since the spring no longer requiring Lasix back to baseline monitor A1c's repeat work on weight loss   CK      TSH 3RD GENERATION   6.  Back spasm  P17.920 620.6 METABOLIC PANEL, BASIC      HEMOGLOBIN A1C WITH EAG      CK   Had x-ray at last winter send her to physical therapy but get this got pushed back due to coronavirus    We will give Flexeril as needed and reordered physical therapy   TSH 3RD GENERATION      REFERRAL TO PHYSICAL THERAPY   7. IFG (impaired fasting glucose)  W79.72 094.01 METABOLIC PANEL, BASIC      HEMOGLOBIN A1C WITH EAG   Monitor A1c work on weight loss   CK      [facility] 3RD GENERATION   8. Cardiac murmur       Last echo was in 2017 we will repeat murmur appears louder today R01.1 785.2 ECHO ADULT COMPLETE           Scribed by Jose Mosley, as dictated by Dr. Sharlene Wills. Current diagnosis and concerns discussed with pt at length. Pt understands risks and benefits or current treatment plan and medications, and accepts the treatment and medication with any possible risks. Pt asks appropriate questions, which were answered. Pt was instructed to call with any concerns or problems. I have reviewed the note documented by the scribe. The services provided are my own.   The documentation is accurate

## 2020-12-09 ENCOUNTER — OFFICE VISIT (OUTPATIENT)
Dept: INTERNAL MEDICINE CLINIC | Age: 70
End: 2020-12-09
Payer: MEDICARE

## 2020-12-09 VITALS
DIASTOLIC BLOOD PRESSURE: 71 MMHG | HEART RATE: 81 BPM | WEIGHT: 188 LBS | OXYGEN SATURATION: 97 % | RESPIRATION RATE: 16 BRPM | BODY MASS INDEX: 30.22 KG/M2 | TEMPERATURE: 98.3 F | SYSTOLIC BLOOD PRESSURE: 131 MMHG | HEIGHT: 66 IN

## 2020-12-09 DIAGNOSIS — M62.830 BACK SPASM: ICD-10-CM

## 2020-12-09 DIAGNOSIS — K20.90 ESOPHAGITIS: ICD-10-CM

## 2020-12-09 DIAGNOSIS — E78.00 PURE HYPERCHOLESTEROLEMIA: ICD-10-CM

## 2020-12-09 DIAGNOSIS — I10 ESSENTIAL HYPERTENSION WITH GOAL BLOOD PRESSURE LESS THAN 130/85: Primary | ICD-10-CM

## 2020-12-09 DIAGNOSIS — R60.0 LOCALIZED EDEMA: ICD-10-CM

## 2020-12-09 DIAGNOSIS — R73.01 IFG (IMPAIRED FASTING GLUCOSE): ICD-10-CM

## 2020-12-09 DIAGNOSIS — M85.89 OSTEOPENIA OF MULTIPLE SITES: ICD-10-CM

## 2020-12-09 DIAGNOSIS — R01.1 CARDIAC MURMUR: ICD-10-CM

## 2020-12-09 PROCEDURE — 1101F PT FALLS ASSESS-DOCD LE1/YR: CPT | Performed by: INTERNAL MEDICINE

## 2020-12-09 PROCEDURE — G8399 PT W/DXA RESULTS DOCUMENT: HCPCS | Performed by: INTERNAL MEDICINE

## 2020-12-09 PROCEDURE — G8432 DEP SCR NOT DOC, RNG: HCPCS | Performed by: INTERNAL MEDICINE

## 2020-12-09 PROCEDURE — G9899 SCRN MAM PERF RSLTS DOC: HCPCS | Performed by: INTERNAL MEDICINE

## 2020-12-09 PROCEDURE — G0463 HOSPITAL OUTPT CLINIC VISIT: HCPCS | Performed by: INTERNAL MEDICINE

## 2020-12-09 PROCEDURE — 1090F PRES/ABSN URINE INCON ASSESS: CPT | Performed by: INTERNAL MEDICINE

## 2020-12-09 PROCEDURE — 99214 OFFICE O/P EST MOD 30 MIN: CPT | Performed by: INTERNAL MEDICINE

## 2020-12-09 PROCEDURE — G8752 SYS BP LESS 140: HCPCS | Performed by: INTERNAL MEDICINE

## 2020-12-09 PROCEDURE — G8536 NO DOC ELDER MAL SCRN: HCPCS | Performed by: INTERNAL MEDICINE

## 2020-12-09 PROCEDURE — G8427 DOCREV CUR MEDS BY ELIG CLIN: HCPCS | Performed by: INTERNAL MEDICINE

## 2020-12-09 PROCEDURE — 3017F COLORECTAL CA SCREEN DOC REV: CPT | Performed by: INTERNAL MEDICINE

## 2020-12-09 PROCEDURE — G8417 CALC BMI ABV UP PARAM F/U: HCPCS | Performed by: INTERNAL MEDICINE

## 2020-12-09 PROCEDURE — G8754 DIAS BP LESS 90: HCPCS | Performed by: INTERNAL MEDICINE

## 2020-12-09 RX ORDER — CYCLOBENZAPRINE HCL 5 MG
5 TABLET ORAL
Qty: 30 TAB | Refills: 0 | Status: SHIPPED | OUTPATIENT
Start: 2020-12-09 | End: 2021-07-01

## 2021-01-05 ENCOUNTER — TELEPHONE (OUTPATIENT)
Dept: INTERNAL MEDICINE CLINIC | Age: 71
End: 2021-01-05

## 2021-01-05 NOTE — TELEPHONE ENCOUNTER
----- Message from Jose Gonzalez sent at 1/5/2021 11:16 AM EST -----  Regarding: Dr. Melecio Stallworth / telephone  General Message/Vendor Calls    Caller's first and last name: NA       Reason for call: Bone Density       Callback required yes/no and why: Yes       Best contact number(s): [phone]      Details to clarify the request: Patient would to have her Bone Density test scheduled.  She uses a facility on [facility] , not sure of the name       Message from [facility]

## 2021-01-07 ENCOUNTER — HOSPITAL ENCOUNTER (OUTPATIENT)
Dept: LAB | Age: 71
Discharge: HOME | End: 2021-01-07
Payer: MEDICARE

## 2021-01-07 PROCEDURE — 36415 COLL VENOUS BLD VENIPUNCTURE: CPT

## 2021-01-07 PROCEDURE — 83036 HEMOGLOBIN GLYCOSYLATED A1C: CPT

## 2021-01-07 PROCEDURE — 80048 BASIC METABOLIC PNL TOTAL CA: CPT

## 2021-01-07 PROCEDURE — 84443 ASSAY THYROID STIM HORMONE: CPT

## 2021-01-07 PROCEDURE — 82550 ASSAY OF CK (CPK): CPT

## 2021-01-07 NOTE — TELEPHONE ENCOUNTER
Called, spoke to pt  Received two pt identifiers  Gave pt Contact info for [employer]. Pt verbalizes understanding of the instructions and has no further questions at this time.

## 2021-01-08 LAB
BUN SERPL-MCNC: 17 MG/DL (ref 8–27)
BUN/CREAT SERPL: 24 (ref 12–28)
CALCIUM SERPL-MCNC: 10 MG/DL (ref 8.7–10.3)
CHLORIDE SERPL-SCNC: 104 MMOL/L (ref 96–106)
CK SERPL-CCNC: 148 U/L (ref 32–182)
CO2 SERPL-SCNC: 24 MMOL/L (ref 20–29)
CREAT SERPL-MCNC: 0.71 MG/DL (ref 0.57–1)
EST. AVERAGE GLUCOSE BLD GHB EST-MCNC: 126 MG/DL
GLUCOSE SERPL-MCNC: 95 MG/DL (ref 65–99)
HBA1C MFR BLD: 6 % (ref 4.8–5.6)
POTASSIUM SERPL-SCNC: 3.9 MMOL/L (ref 3.5–5.2)
SODIUM SERPL-SCNC: 143 MMOL/L (ref 134–144)
TSH SERPL DL<=0.005 MIU/L-ACNC: 2.27 UIU/ML (ref 0.45–4.5)

## 2021-01-11 ENCOUNTER — HOSPITAL ENCOUNTER (OUTPATIENT)
Dept: BONE DENSITY | Age: 71
Discharge: HOME | End: 2021-01-11
Attending: INTERNAL MEDICINE
Payer: MEDICARE

## 2021-01-11 DIAGNOSIS — M85.89 OSTEOPENIA OF MULTIPLE SITES: ICD-10-CM

## 2021-01-11 PROCEDURE — 77080 DXA BONE DENSITY AXIAL: CPT

## 2021-05-04 RX ORDER — HYDROCHLOROTHIAZIDE 25 MG/1
TABLET ORAL
Qty: 90 TAB | Refills: 0 | Status: SHIPPED | OUTPATIENT
Start: 2021-05-04 | End: 2021-08-02

## 2021-05-04 RX ORDER — SIMVASTATIN 40 MG/1
TABLET, FILM COATED ORAL
Qty: 90 TAB | Refills: 0 | Status: SHIPPED | OUTPATIENT
Start: 2021-05-04 | End: 2021-07-01 | Stop reason: SDUPTHER

## 2021-05-04 RX ORDER — LOSARTAN POTASSIUM 100 MG/1
TABLET ORAL
Qty: 90 TAB | Refills: 0 | Status: SHIPPED | OUTPATIENT
Start: 2021-05-04 | End: 2021-09-09

## 2021-06-02 ENCOUNTER — HOSPITAL ENCOUNTER (OUTPATIENT)
Dept: NON INVASIVE DIAGNOSTICS | Age: 71
Discharge: HOME | End: 2021-06-02
Attending: INTERNAL MEDICINE
Payer: MEDICARE

## 2021-06-02 VITALS
WEIGHT: 188.05 LBS | DIASTOLIC BLOOD PRESSURE: 71 MMHG | HEIGHT: 66 IN | BODY MASS INDEX: 30.22 KG/M2 | SYSTOLIC BLOOD PRESSURE: 131 MMHG

## 2021-06-02 DIAGNOSIS — R01.1 CARDIAC MURMUR: ICD-10-CM

## 2021-06-02 PROCEDURE — 93306 TTE W/DOPPLER COMPLETE: CPT | Performed by: INTERNAL MEDICINE

## 2021-06-02 PROCEDURE — 93306 TTE W/DOPPLER COMPLETE: CPT

## 2021-06-03 LAB
ECHO AO ROOT DIAM: 2.61 CM
ECHO AV AREA PEAK VELOCITY: 2.35 CM2
ECHO AV AREA VTI: 2.32 CM2
ECHO AV AREA/BSA PEAK VELOCITY: 1.2 CM2/M2
ECHO AV AREA/BSA VTI: 1.2 CM2/M2
ECHO AV MEAN GRADIENT: 5.14 MMHG
ECHO AV PEAK GRADIENT: 9.27 MMHG
ECHO AV PEAK VELOCITY: 152.26 CM/S
ECHO AV VTI: 35.79 CM
ECHO EST RA PRESSURE: 10 MMHG
ECHO LA AREA 4C: 16.83 CM2
ECHO LA MAJOR AXIS: 2.89 CM
ECHO LA MINOR AXIS: 1.48 CM
ECHO LA VOL 4C: 48.23 ML (ref 22–52)
ECHO LA VOLUME INDEX A4C: 24.73 ML/M2 (ref 16–28)
ECHO LV E' LATERAL VELOCITY: 11.31 CM/S
ECHO LV E' SEPTAL VELOCITY: 8.71 CM/S
ECHO LV INTERNAL DIMENSION DIASTOLIC: 3.94 CM (ref 3.9–5.3)
ECHO LV INTERNAL DIMENSION SYSTOLIC: 2.5 CM
ECHO LV IVSD: 1.12 CM (ref 0.6–0.9)
ECHO LV MASS 2D: 125 G (ref 67–162)
ECHO LV MASS INDEX 2D: 64.1 G/M2 (ref 43–95)
ECHO LV POSTERIOR WALL DIASTOLIC: 0.89 CM (ref 0.6–0.9)
ECHO LVOT CARDIAC OUTPUT: 4.9 LITER/MINUTE
ECHO LVOT DIAM: 1.77 CM
ECHO LVOT PEAK GRADIENT: 8.39 MMHG
ECHO LVOT PEAK VELOCITY: 144.8 CM/S
ECHO LVOT SV: 83.1 ML
ECHO LVOT VTI: 33.61 CM
ECHO MV A VELOCITY: 105.86 CM/S
ECHO MV AREA PHT: 4.17 CM2
ECHO MV AREA VTI: 2.91 CM2
ECHO MV E DECELERATION TIME (DT): 209.53 MS
ECHO MV E VELOCITY: 97.65 CM/S
ECHO MV E/A RATIO: 0.92
ECHO MV E/E' LATERAL: 8.63
ECHO MV E/E' RATIO (AVERAGED): 9.92
ECHO MV E/E' SEPTAL: 11.21
ECHO MV MAX VELOCITY: 106.54 CM/S
ECHO MV MEAN GRADIENT: 1.82 MMHG
ECHO MV PEAK GRADIENT: 4.54 MMHG
ECHO MV PRESSURE HALF TIME (PHT): 52.71 MS
ECHO MV VTI: 28.57 CM
ECHO PV MAX VELOCITY: 79.74 CM/S
ECHO PV MEAN GRADIENT: 1.6 MMHG
ECHO PV PEAK INSTANTANEOUS GRADIENT SYSTOLIC: 2.54 MMHG
ECHO PV VTI: 18.94 CM
ECHO RIGHT VENTRICULAR SYSTOLIC PRESSURE (RVSP): 32.61 MMHG
ECHO TV REGURGITANT MAX VELOCITY: 237.44 CM/S
ECHO TV REGURGITANT MAX VELOCITY: 537.45 CM/S
ECHO TV REGURGITANT PEAK GRADIENT: 22.61 MMHG
LVOT MG: 4.46 MMHG

## 2021-06-08 NOTE — PROGRESS NOTES
HISTORY OF PRESENT ILLNESS  Laura Fitzgerald is a 78 y.o. female. HPI     Last here 12/9/20 Pt is here for routine care. She c/o R knee pain  Pain is on and off, doesn't hurt today   Her leg will give out   On exam: no crepitus   Will get xray, discussed PT in future for strength    She c/o mucous  She takes mucinex  Discussed taking zyrtec and flonase     BP today is 136/73  No home readings, she needs a new machine   Pt is on losartan 100 mg and hctz 25 mg now separate    Recall pt had a cough on lisinopril. She was taking this medication for 20 years.     Wt is 194 lbs - up 6 lbs x lov    She hasn't been getting out of the house, was taking care of her mom who passed away last month  Discussed portions/ww     Reviewed labs   Will get labs today      Pt follows with Dr. Isaiah Bernstein (GI)   Last visit was 8/17  Pt takes protonix only once daily now-- helps gerd/reflux  Recall she previously followed with Dr. Isaiah Bernstein about her stomach and episodes of diarrhea. Her stomach pain has resolved, continues on prevacid for this. Previously took amitiza prn but no longer on this, had hemorrhoid surgery.  Doing well       Pt was referred to Dr. Pantera Lester (cardio) by Dr. Isaiah Bernstein (GI)   Last visit was 11/3/17  Discussed f/u for change in echo     Continues on zocor 40mg daily for cholesterol      Pt has a chronic cough   Uses flonase and zyrtec for this   Not using any inhalers   When she travels to other places her cough resolves, went to [place] this past weekend and didn't have cough until she got back home      Has xanax to use prn but has not used this recently   Her mother passed away last month, she is doing okay overall     She was given oral estrogen from gyn for hot flashes, this has helped, doesn't happen as frequently as they used to   Discussed keeping an eye on bp while on oral estrogen, discussed if she ever has heart attack/stroke she would need to stop taking this       Murmur heard on exam lov  Last echo 2017: mild leaky valves  Reviewed echo : 60 - 65%. Visually measured ejection fraction. Normal cavity size and wall thickness. · PA: Pulmonary arterial systolic pressure is 34 mmHg. · TV: Mild tricuspid valve regurgitation is present. MV: Mild to moderate mitral valve regurgitation is present. Regurgitation has increased since last echo, discussed f/u with cardio    Reviewed dexa, discussed vit d for tx    No safety equip   Pt lives with      Pt is functionally independent       No memory concerns   Knows the month, date, year, location   Can recall 3/3 objects     ACP not on file. SDM is her  Jovan Marion).   Provided this info in the past, she will bring to next office visit      PREVENTIVE:  Colonoscopy:  with Dr. Zo Vivar, 10 yr f/u  EGD: 18 with Dr. Zo Vivar, discussed f/u for repeat  AAA: [facility]  Pap: Dr. Mitesh Montoya, 3/21  Mammogram:11/10/20 negative  DEXA: 21, mild osteopenia   Tdap:   Pneumovax: 2017  Mjmvvne39: 18   Shingles: declines  Flu shot: declines    A1c: 10/16 6.4,  6.1,  6.0,  6.0,  6.0  6.0  6.2  6.0  Eye exam: Dr. Al Cornea,  has eye drops for glaucoma   Hep C screen:  negative  Lipids:  LDL 90  EK/3/17, nsr with cardio  Covid: both (moderna)    Patient Active Problem List    Diagnosis Date Noted    Essential hypertension with goal blood pressure less than 130/85 2016    Pure hypercholesterolemia 2016    Osteopenia 2012    Constipation 2011    Glaucoma 2011    Vertigo 2011    Headache(784.0) 2011    Esophagitis 2011     Current Outpatient Medications   Medication Sig Dispense Refill    simvastatin (ZOCOR) 40 mg tablet TAKE 1 TABLET BY MOUTH DAILY 90 Tab 0    hydroCHLOROthiazide (HYDRODIURIL) 25 mg tablet TAKE 1 TABLET BY MOUTH EVERY DAY 90 Tab 0    losartan (COZAAR) 100 mg tablet TAKE 1 TABLET BY MOUTH DAILY 90 Tab 0    cyclobenzaprine (FLEXERIL) 5 mg tablet Take 1 Tab by mouth nightly as needed for Muscle Spasm(s). 30 Tab 0    pantoprazole (PROTONIX) 40 mg tablet TAKE 1 TABLET BY MOUTH ONCE DAILY. 180 Tab 1    simvastatin (ZOCOR) 40 mg tablet TAKE 1 TABLET BY MOUTH DAILY 90 Tab 0    triamcinolone acetonide (KENALOG) 0.1 % topical cream Apply  to affected area two (2) times a day. use thin layer 30 g 0    furosemide (LASIX) 20 mg tablet Take 1 Tab by mouth daily. 14 Tab 0    albuterol (PROAIR HFA) 90 mcg/actuation inhaler Take 1 Puff by inhalation every four (4) hours as needed for Wheezing or Shortness of Breath. 1 Inhaler 1    fluticasone (FLONASE ALLERGY RELIEF) 50 mcg/actuation nasal spray 2 Sprays by Both Nostrils route daily.  polyethylene glycol (MIRALAX) 17 gram packet Take 17 g by mouth daily.        Past Surgical History:   Procedure Laterality Date    COLONOSCOPY N/A 2/28/2018    [facility]    HX BREAST BIOPSY Left 1980s    benign cyst    HX GI      hemmorhoidectomy    HX HYSTERECTOMY      HX OTHER SURGICAL      \"hemrrhoid surgery\"    [place] BREAST SURGERY PROCEDURE UNLISTED        Lab Results   Component Value Date/Time    WBC 5.0 06/05/2020 12:42 PM    HGB 12.6 06/05/2020 12:42 PM    HCT 38.4 06/05/2020 12:42 PM    PLATELET 692 62/45/6892 12:42 PM    MCV 93 06/05/2020 12:42 PM     Lab Results   Component Value Date/Time    Cholesterol, total 175 06/05/2020 12:42 PM    HDL Cholesterol 62 06/05/2020 12:42 PM    LDL, calculated 90 06/05/2020 12:42 PM    Triglyceride 116 06/05/2020 12:42 PM     Lab Results   Component Value Date/Time    GFR est non-AA 88 01/07/2021 10:27 AM    GFR est  01/07/2021 10:27 AM    Creatinine 0.71 01/07/2021 10:27 AM    BUN 17 01/07/2021 10:27 AM    Sodium 143 01/07/2021 10:27 AM    Potassium 3.9 01/07/2021 10:27 AM    Chloride 104 01/07/2021 10:27 AM    CO2 24 01/07/2021 10:27 AM    Magnesium 1.9 09/03/2014 10:55 AM        Review of Systems   Respiratory: Negative for shortness of breath. Cardiovascular: Negative for chest pain. Musculoskeletal: Positive for joint pain. Physical Exam  Constitutional:       General: She is not in acute distress. Appearance: Normal appearance. She is not ill-appearing, toxic-appearing or diaphoretic. HENT:      Head: Normocephalic and atraumatic. Right Ear: External ear normal.      Left Ear: External ear normal.   Eyes:      General:         Right eye: No discharge. Left eye: No discharge. Conjunctiva/sclera: Conjunctivae normal.      Pupils: Pupils are equal, round, and reactive to light. Neck:      Vascular: No carotid bruit. Cardiovascular:      Rate and Rhythm: Normal rate and regular rhythm. Pulses: Normal pulses. Heart sounds: Murmur (slight) heard. No friction rub. No gallop. Pulmonary:      Effort: No respiratory distress. Breath sounds: Normal breath sounds. No wheezing or rales. Chest:      Chest wall: No tenderness. Musculoskeletal:         General: Normal range of motion. Cervical back: Normal range of motion and neck supple. Comments: No crepitus in knees   Skin:     General: Skin is warm and dry. Neurological:      Mental Status: She is alert and oriented to person, place, and time. Mental status is at baseline. Coordination: Coordination normal.      Gait: Gait normal.   Psychiatric:         Mood and Affect: Mood normal.         Behavior: Behavior normal.         ASSESSMENT and PLAN    ICD-10-CM ICD-9-CM    1.  Nonrheumatic mitral valve regurgitation            I34.0 424.0 LIPID PANEL      METABOLIC PANEL, COMPREHENSIVE   Echocardiogram up-to-date   CBC W/O DIFF      TSH 3RD GENERATION      HEMOGLOBIN A1C WITH EAG      REFERRAL TO CARDIOLOGY   2. Pure hypercholesterolemia  E78.00 272.0 LIPID PANEL      METABOLIC PANEL, COMPREHENSIVE      CBC W/O DIFF   Controlled on Zocor 40 mg monitor lipids adjust dosing as needed   TSH 3RD GENERATION HEMOGLOBIN A1C WITH EAG      REFERRAL TO CARDIOLOGY   3. Essential hypertension with goal blood pressure less than 130/85  I10 401.9 LIPID PANEL      METABOLIC PANEL, COMPREHENSIVE   Controlled on losartan hydrochlorothiazide continue no change to dose   CBC W/O DIFF      TSH 3RD GENERATION   Monitor K and creatinine levels to ensure stable   HEMOGLOBIN A1C WITH EAG      REFERRAL TO CARDIOLOGY   4. Esophagitis  K20.90 530.10 LIPID PANEL      METABOLIC PANEL, COMPREHENSIVE   Controlled Protonix daily no change today   CBC W/O DIFF      TSH 3RD GENERATION   Due egd   HEMOGLOBIN A1C WITH EAG      REFERRAL TO CARDIOLOGY   5. Osteopenia of multiple sites  M85.89 733.90 LIPID PANEL      METABOLIC PANEL, COMPREHENSIVE      CBC W/O DIFF   Vitamin D for treatment bone density up-to-date   TSH 3RD GENERATION      HEMOGLOBIN A1C WITH EAG      REFERRAL TO CARDIOLOGY   6. Hot flashes  R23.2 782.62 LIPID PANEL      METABOLIC PANEL, COMPREHENSIVE   Now on hormonal therapy through GYN okay to continue for now   CBC W/O DIFF      TSH 3RD GENERATION      HEMOGLOBIN A1C WITH EAG      REFERRAL TO CARDIOLOGY   7. IFG (impaired fasting glucose)  R73.01 790.21 LIPID PANEL      METABOLIC PANEL, COMPREHENSIVE   Monitor A1c   CBC W/O DIFF      TSH 3RD GENERATION      HEMOGLOBIN A1C WITH EAG      REFERRAL TO CARDIOLOGY   8. Chronic pain of right knee  M25.561 719.46 XR KNEE RT MAX 2 VWS   Check plain film evaluate for arthritis G89.29 338.29       Depression screen reviewed and negative      Scribed by Addison Conner, as dictated by Dr. Tj Vera. Current diagnosis and concerns discussed with pt at length. Pt understands risks and benefits or current treatment plan and medications, and accepts the treatment and medication with any possible risks. Pt asks appropriate questions, which were answered. Pt was instructed to call with any concerns or problems. I have reviewed the note documented by the scribe.   The services provided are my own.   The documentation is accurate

## 2021-06-09 ENCOUNTER — OFFICE VISIT (OUTPATIENT)
Dept: INTERNAL MEDICINE CLINIC | Age: 71
End: 2021-06-09
Payer: MEDICARE

## 2021-06-09 VITALS
SYSTOLIC BLOOD PRESSURE: 136 MMHG | HEART RATE: 66 BPM | BODY MASS INDEX: 31.18 KG/M2 | HEIGHT: 66 IN | OXYGEN SATURATION: 100 % | DIASTOLIC BLOOD PRESSURE: 73 MMHG | TEMPERATURE: 97.9 F | RESPIRATION RATE: 16 BRPM | WEIGHT: 194 LBS

## 2021-06-09 DIAGNOSIS — I34.0 NONRHEUMATIC MITRAL VALVE REGURGITATION: Primary | ICD-10-CM

## 2021-06-09 DIAGNOSIS — R23.2 HOT FLASHES: ICD-10-CM

## 2021-06-09 DIAGNOSIS — G89.29 CHRONIC PAIN OF RIGHT KNEE: ICD-10-CM

## 2021-06-09 DIAGNOSIS — I10 ESSENTIAL HYPERTENSION WITH GOAL BLOOD PRESSURE LESS THAN 130/85: ICD-10-CM

## 2021-06-09 DIAGNOSIS — M25.561 CHRONIC PAIN OF RIGHT KNEE: ICD-10-CM

## 2021-06-09 DIAGNOSIS — Z00.00 MEDICARE ANNUAL WELLNESS VISIT, SUBSEQUENT: ICD-10-CM

## 2021-06-09 DIAGNOSIS — M85.89 OSTEOPENIA OF MULTIPLE SITES: ICD-10-CM

## 2021-06-09 DIAGNOSIS — E78.00 PURE HYPERCHOLESTEROLEMIA: ICD-10-CM

## 2021-06-09 DIAGNOSIS — K20.90 ESOPHAGITIS: ICD-10-CM

## 2021-06-09 DIAGNOSIS — R73.01 IFG (IMPAIRED FASTING GLUCOSE): ICD-10-CM

## 2021-06-09 PROCEDURE — 1101F PT FALLS ASSESS-DOCD LE1/YR: CPT | Performed by: INTERNAL MEDICINE

## 2021-06-09 PROCEDURE — G0439 PPPS, SUBSEQ VISIT: HCPCS | Performed by: INTERNAL MEDICINE

## 2021-06-09 PROCEDURE — G8536 NO DOC ELDER MAL SCRN: HCPCS | Performed by: INTERNAL MEDICINE

## 2021-06-09 PROCEDURE — G8417 CALC BMI ABV UP PARAM F/U: HCPCS | Performed by: INTERNAL MEDICINE

## 2021-06-09 PROCEDURE — G8754 DIAS BP LESS 90: HCPCS | Performed by: INTERNAL MEDICINE

## 2021-06-09 PROCEDURE — G8427 DOCREV CUR MEDS BY ELIG CLIN: HCPCS | Performed by: INTERNAL MEDICINE

## 2021-06-09 PROCEDURE — G9899 SCRN MAM PERF RSLTS DOC: HCPCS | Performed by: INTERNAL MEDICINE

## 2021-06-09 PROCEDURE — G8510 SCR DEP NEG, NO PLAN REQD: HCPCS | Performed by: INTERNAL MEDICINE

## 2021-06-09 PROCEDURE — 99214 OFFICE O/P EST MOD 30 MIN: CPT | Performed by: INTERNAL MEDICINE

## 2021-06-09 PROCEDURE — G8399 PT W/DXA RESULTS DOCUMENT: HCPCS | Performed by: INTERNAL MEDICINE

## 2021-06-09 PROCEDURE — 3017F COLORECTAL CA SCREEN DOC REV: CPT | Performed by: INTERNAL MEDICINE

## 2021-06-09 PROCEDURE — G8752 SYS BP LESS 140: HCPCS | Performed by: INTERNAL MEDICINE

## 2021-06-09 PROCEDURE — 1090F PRES/ABSN URINE INCON ASSESS: CPT | Performed by: INTERNAL MEDICINE

## 2021-06-09 NOTE — PROGRESS NOTES
This is the Subsequent Medicare Annual Wellness Exam, performed 12 months or more after the Initial AWV or the last Subsequent AWV    I have reviewed the patient's medical history in detail and updated the computerized patient record. Assessment/Plan   Education and counseling provided:  Are appropriate based on today's review and evaluation    1. Nonrheumatic mitral valve regurgitation  -     LIPID PANEL; Future  -     METABOLIC PANEL, COMPREHENSIVE; Future  -     CBC W/O DIFF; Future  -     TSH 3RD GENERATION; Future  -     HEMOGLOBIN A1C WITH EAG; Future  -     REFERRAL TO CARDIOLOGY  2. Pure hypercholesterolemia  -     LIPID PANEL; Future  -     METABOLIC PANEL, COMPREHENSIVE; Future  -     CBC W/O DIFF; Future  -     TSH 3RD GENERATION; Future  -     HEMOGLOBIN A1C WITH EAG; Future  -     REFERRAL TO CARDIOLOGY  3. Essential hypertension with goal blood pressure less than 130/85  -     LIPID PANEL; Future  -     METABOLIC PANEL, COMPREHENSIVE; Future  -     CBC W/O DIFF; Future  -     TSH 3RD GENERATION; Future  -     HEMOGLOBIN A1C WITH EAG; Future  -     REFERRAL TO CARDIOLOGY  4. Esophagitis  -     LIPID PANEL; Future  -     METABOLIC PANEL, COMPREHENSIVE; Future  -     CBC W/O DIFF; Future  -     TSH 3RD GENERATION; Future  -     HEMOGLOBIN A1C WITH EAG; Future  -     REFERRAL TO CARDIOLOGY  5. Osteopenia of multiple sites  -     LIPID PANEL; Future  -     METABOLIC PANEL, COMPREHENSIVE; Future  -     CBC W/O DIFF; Future  -     TSH 3RD GENERATION; Future  -     HEMOGLOBIN A1C WITH EAG; Future  -     REFERRAL TO CARDIOLOGY  6. Hot flashes  -     LIPID PANEL; Future  -     METABOLIC PANEL, COMPREHENSIVE; Future  -     CBC W/O DIFF; Future  -     TSH 3RD GENERATION; Future  -     HEMOGLOBIN A1C WITH EAG; Future  -     REFERRAL TO CARDIOLOGY  7. IFG (impaired fasting glucose)  -     LIPID PANEL; Future  -     METABOLIC PANEL, COMPREHENSIVE; Future  -     CBC W/O DIFF;  Future  -     TSH 3RD GENERATION; Future  -     HEMOGLOBIN A1C WITH EAG; Future  -     REFERRAL TO CARDIOLOGY  8. Chronic pain of right knee  -     XR KNEE RT MAX 2 VWS; Future       Depression Risk Factor Screening     3 most recent PHQ Screens 6/9/2021   Little interest or pleasure in doing things Not at all   Feeling down, depressed, irritable, or hopeless Not at all   Total Score PHQ 2 0       Alcohol Risk Screen    Do you average more than 1 drink per night or more than 7 drinks a week:  No    On any one occasion in the past three months have you have had more than 3 drinks containing alcohol:  No        Functional Ability and Level of Safety    Hearing: Hearing is good. Activities of Daily Living: The home contains: no safety equipment. Patient does total self care      Ambulation: with no difficulty     Fall Risk:  Fall Risk Assessment, last 12 mths 6/9/2021   Able to walk? Yes   Fall in past 12 months?  0      Abuse Screen:  Patient is not abused   lives with      Cognitive Screening    Has your family/caregiver stated any concerns about your memory: no     Cognitive Screening: Normal - Mini Cog Test     No memory concerns   Pt knows the month, day and year   Can recall 3/3 objects     Health Maintenance Due     Health Maintenance Due   Topic Date Due    Shingrix Vaccine Age 49> (1 of 2) Never done    Lipid Screen  06/05/2021       Patient Care Team   Patient Care Team:  Jessica Meier MD as PCP - General (Internal Medicine)  Jessica Meier MD as PCP - [facility]ATION [facility]  Beverly Lyons MD as Physician (Cardiology)  Светлана Miller MD (Gastroenterology)  Shaheed Mark MD (Ophthalmology)  Heidi Mckeon MD (Obstetrics & Gynecology)  Timbo Keane MD (Otolaryngology)    History     Patient Active Problem List   Diagnosis Code    Esophagitis K20.90    Headache(784.0) R46    Constipation K59.00    Glaucoma H40.9    Vertigo R42    Osteopenia M85.80    Essential hypertension with goal blood pressure less than 130/85 I10    Pure hypercholesterolemia E78.00     Past Medical History:   Diagnosis Date    Arthritis     narrowing of spine    Environmental and seasonal allergies     GERD - Esophagitis - reflux 3/22/2011    Headache(784.0) 6/27/2011    Hypercholesterolemia 3/22/2011    Hypertension 3/22/2011    Other ill-defined conditions(799.89)     high cholesterol, vertigo, migraine    Ulcer       Past Surgical History:   Procedure Laterality Date    COLONOSCOPY N/A 2/28/2018    COLONOSCOPY performed by Manda Gregory MD at [facility] ENDOSCOPY    HX BREAST BIOPSY Left 1980s    benign cyst    HX GI      hemmorhoidectomy    HX HYSTERECTOMY      HX OTHER SURGICAL      \"hemrrhoid surgery\"    [place] BREAST SURGERY PROCEDURE UNLISTED       Current Outpatient Medications   Medication Sig Dispense Refill    simvastatin (ZOCOR) 40 mg tablet TAKE 1 TABLET BY MOUTH DAILY 90 Tab 0    hydroCHLOROthiazide (HYDRODIURIL) 25 mg tablet TAKE 1 TABLET BY MOUTH EVERY DAY 90 Tab 0    losartan (COZAAR) 100 mg tablet TAKE 1 TABLET BY MOUTH DAILY 90 Tab 0    cyclobenzaprine (FLEXERIL) 5 mg tablet Take 1 Tab by mouth nightly as needed for Muscle Spasm(s). 30 Tab 0    pantoprazole (PROTONIX) 40 mg tablet TAKE 1 TABLET BY MOUTH ONCE DAILY. 180 Tab 1    simvastatin (ZOCOR) 40 mg tablet TAKE 1 TABLET BY MOUTH DAILY 90 Tab 0    triamcinolone acetonide (KENALOG) 0.1 % topical cream Apply  to affected area two (2) times a day. use thin layer 30 g 0    furosemide (LASIX) 20 mg tablet Take 1 Tab by mouth daily. 14 Tab 0    albuterol (PROAIR HFA) 90 mcg/actuation inhaler Take 1 Puff by inhalation every four (4) hours as needed for Wheezing or Shortness of Breath. 1 Inhaler 1    fluticasone (FLONASE ALLERGY RELIEF) 50 mcg/actuation nasal spray 2 Sprays by Both Nostrils route daily.  polyethylene glycol (MIRALAX) 17 gram packet Take 17 g by mouth daily.        No Known Allergies    Family History   Problem Relation Age of Onset    Hypertension Mother     Hypertension Father      Social History     Tobacco Use    Smoking status: Never Smoker    Smokeless tobacco: Never Used   Substance Use Topics    Alcohol use: No   ACP not on file. SDM is her  [place] [employer]. Provided this info in the past, she will bring to next office visit      Colonoscopy:  with Dr. Justino Fritz, 10 yr f/u  AAA: [facility]  Pap: Dr. Nemo Hanley, 3/21 q2 yr  Mammogram:11/10/20 negative annual  DEXA: 21, mild osteopenia  q2yr    Tdap:   Pneumovax: 2017  Zcbaimz41: 18   Shingles: declines  Flu shot: declines        A1c:   6.0 due     Eye exam: Dr. Bailey Rios,  has eye drops for glaucoma  Annual     Hep C screen:  negative  Lipids:  LDL 90 due     EK/3/17, nsr with cardio  Covid: both (moderna)    Medication reconciliation completed by MA and reviewed by me. Medical/surgical/social/family history reviewed and updated by me. Patient provided AVS and preventative screening table. Patient verbalized understanding of all information discussed.         Marixa Hamilton MD

## 2021-06-09 NOTE — PATIENT INSTRUCTIONS

## 2021-06-15 ENCOUNTER — APPOINTMENT (OUTPATIENT)
Dept: INTERNAL MEDICINE CLINIC | Age: 71
End: 2021-06-15

## 2021-06-15 LAB
ALBUMIN SERPL-MCNC: 4.5 G/DL (ref 3.5–5)
ALBUMIN/GLOB SERPL: 1.6 {RATIO} (ref 1.1–2.2)
ALP SERPL-CCNC: 72 U/L (ref 45–117)
ALT SERPL-CCNC: 17 U/L (ref 12–78)
ANION GAP SERPL CALC-SCNC: 7 MMOL/L (ref 5–15)
AST SERPL-CCNC: 15 U/L (ref 15–37)
BILIRUB SERPL-MCNC: 0.5 MG/DL (ref 0.2–1)
BUN SERPL-MCNC: 17 MG/DL (ref 6–20)
BUN/CREAT SERPL: 20 (ref 12–20)
CALCIUM SERPL-MCNC: 9.6 MG/DL (ref 8.5–10.1)
CHLORIDE SERPL-SCNC: 104 MMOL/L (ref 97–108)
CHOLEST SERPL-MCNC: 158 MG/DL
CO2 SERPL-SCNC: 27 MMOL/L (ref 21–32)
CREAT SERPL-MCNC: 0.83 MG/DL (ref 0.55–1.02)
ERYTHROCYTE [DISTWIDTH] IN BLOOD BY AUTOMATED COUNT: 13.2 % (ref 11.5–14.5)
EST. AVERAGE GLUCOSE BLD GHB EST-MCNC: 123 MG/DL
GLOBULIN SER CALC-MCNC: 2.8 G/DL (ref 2–4)
GLUCOSE SERPL-MCNC: 89 MG/DL (ref 65–100)
HBA1C MFR BLD: 5.9 % (ref 4–5.6)
HCT VFR BLD AUTO: 36.1 % (ref 35–47)
HDLC SERPL-MCNC: 68 MG/DL
HDLC SERPL: 2.3 {RATIO} (ref 0–5)
HGB BLD-MCNC: 11.6 G/DL (ref 11.5–16)
LDLC SERPL CALC-MCNC: 74.8 MG/DL (ref 0–100)
MCH RBC QN AUTO: 30.4 PG (ref 26–34)
MCHC RBC AUTO-ENTMCNC: 32.1 G/DL (ref 30–36.5)
MCV RBC AUTO: 94.8 FL (ref 80–99)
NRBC # BLD: 0 K/UL (ref 0–0.01)
NRBC BLD-RTO: 0 PER 100 WBC
PLATELET # BLD AUTO: 333 K/UL (ref 150–400)
PMV BLD AUTO: 11.1 FL (ref 8.9–12.9)
POTASSIUM SERPL-SCNC: 4 MMOL/L (ref 3.5–5.1)
PROT SERPL-MCNC: 7.3 G/DL (ref 6.4–8.2)
RBC # BLD AUTO: 3.81 M/UL (ref 3.8–5.2)
SODIUM SERPL-SCNC: 138 MMOL/L (ref 136–145)
TRIGL SERPL-MCNC: 76 MG/DL (ref ?–150)
TSH SERPL DL<=0.05 MIU/L-ACNC: 1.31 UIU/ML (ref 0.36–3.74)
VLDLC SERPL CALC-MCNC: 15.2 MG/DL
WBC # BLD AUTO: 4.7 K/UL (ref 3.6–11)

## 2021-07-01 ENCOUNTER — OFFICE VISIT (OUTPATIENT)
Dept: CARDIOLOGY CLINIC | Age: 71
End: 2021-07-01
Payer: MEDICARE

## 2021-07-01 VITALS
SYSTOLIC BLOOD PRESSURE: 128 MMHG | OXYGEN SATURATION: 100 % | DIASTOLIC BLOOD PRESSURE: 80 MMHG | WEIGHT: 189.2 LBS | RESPIRATION RATE: 18 BRPM | HEIGHT: 66 IN | BODY MASS INDEX: 30.41 KG/M2 | HEART RATE: 68 BPM

## 2021-07-01 DIAGNOSIS — E78.00 PURE HYPERCHOLESTEROLEMIA: ICD-10-CM

## 2021-07-01 DIAGNOSIS — I34.0 NONRHEUMATIC MITRAL VALVE REGURGITATION: Primary | ICD-10-CM

## 2021-07-01 DIAGNOSIS — I10 ESSENTIAL HYPERTENSION WITH GOAL BLOOD PRESSURE LESS THAN 130/85: ICD-10-CM

## 2021-07-01 PROCEDURE — G8417 CALC BMI ABV UP PARAM F/U: HCPCS | Performed by: INTERNAL MEDICINE

## 2021-07-01 PROCEDURE — G8399 PT W/DXA RESULTS DOCUMENT: HCPCS | Performed by: INTERNAL MEDICINE

## 2021-07-01 PROCEDURE — 99214 OFFICE O/P EST MOD 30 MIN: CPT | Performed by: INTERNAL MEDICINE

## 2021-07-01 PROCEDURE — G8427 DOCREV CUR MEDS BY ELIG CLIN: HCPCS | Performed by: INTERNAL MEDICINE

## 2021-07-01 PROCEDURE — G9899 SCRN MAM PERF RSLTS DOC: HCPCS | Performed by: INTERNAL MEDICINE

## 2021-07-01 PROCEDURE — 93010 ELECTROCARDIOGRAM REPORT: CPT | Performed by: INTERNAL MEDICINE

## 2021-07-01 PROCEDURE — 1090F PRES/ABSN URINE INCON ASSESS: CPT | Performed by: INTERNAL MEDICINE

## 2021-07-01 PROCEDURE — G8754 DIAS BP LESS 90: HCPCS | Performed by: INTERNAL MEDICINE

## 2021-07-01 PROCEDURE — 1101F PT FALLS ASSESS-DOCD LE1/YR: CPT | Performed by: INTERNAL MEDICINE

## 2021-07-01 PROCEDURE — G8752 SYS BP LESS 140: HCPCS | Performed by: INTERNAL MEDICINE

## 2021-07-01 PROCEDURE — G8510 SCR DEP NEG, NO PLAN REQD: HCPCS | Performed by: INTERNAL MEDICINE

## 2021-07-01 PROCEDURE — G8536 NO DOC ELDER MAL SCRN: HCPCS | Performed by: INTERNAL MEDICINE

## 2021-07-01 PROCEDURE — 3017F COLORECTAL CA SCREEN DOC REV: CPT | Performed by: INTERNAL MEDICINE

## 2021-07-01 RX ORDER — ESTRADIOL 0.5 MG/1
0.5 TABLET ORAL DAILY
COMMUNITY
Start: 2021-03-26 | End: 2022-06-06

## 2021-07-01 RX ORDER — TIMOLOL MALEATE 5 MG/ML
1 SOLUTION/ DROPS OPHTHALMIC 2 TIMES DAILY
COMMUNITY

## 2021-07-01 RX ORDER — DEXTROMETHORPHAN HYDROBROMIDE, GUAIFENESIN 5; 100 MG/5ML; MG/5ML
650 LIQUID ORAL EVERY 8 HOURS
COMMUNITY

## 2021-07-01 NOTE — LETTER
7/29/2021    Patient: Marta Samayoa   YOB: 1950   Date of Visit: 7/1/2021     Stephanie Parsons MD  [address]  [address]  [place]    Dear Stephanie Parsons MD,      Thank you for referring Ms. Marta Samayoa to [facility] for evaluation. My notes for this consultation are attached. If you have questions, please do not hesitate to call me. I look forward to following your patient along with you.       Sincerely,    Farshad West MD

## 2021-07-01 NOTE — PROGRESS NOTES
Chief Complaint   Patient presents with    Mitral Regurgitation     Referred By PCP- Zane Libman, MD. Denies Cardiac Symptoms, C/O increase fatigue \"feeling tired\"    Hyperlipidemia    Hypertension     Visit Vitals  /80 (BP 1 Location: Left arm, BP Patient Position: Sitting, BP Cuff Size: Adult)   Pulse 68   Resp 18   Ht 5' 6\" (1.676 m)   Wt 189 lb 3.2 oz (85.8 kg)   SpO2 100%   BMI 30.54 kg/m²       1. Have you been to the ER, urgent care clinic since your last visit? Hospitalized since your last visit? No    2. Have you seen or consulted any other health care providers outside of the [address] [place] [address] since your last visit? Include any pap smears or colon screening.  No

## 2021-08-04 ENCOUNTER — TELEPHONE (OUTPATIENT)
Dept: INTERNAL MEDICINE CLINIC | Age: 71
End: 2021-08-04

## 2021-08-04 DIAGNOSIS — R06.09 DOE (DYSPNEA ON EXERTION): Primary | ICD-10-CM

## 2021-08-04 NOTE — TELEPHONE ENCOUNTER
Called, spoke with Pt. Two pt identifiers confirmed. Pt asked who she was referred too for therapy. Pt reports [facility]. Let pt know I will call them and get back to her. Pt verbalized understanding of information discussed w/ no further questions at this time. Called [facility] and spoke with .   Refaxed order and she stated she will give patient a call for an appt.

## 2021-08-04 NOTE — TELEPHONE ENCOUNTER
----- Message from Regine Abad sent at 8/4/2021  8:42 AM EDT -----  Regarding: Dr. Ro Tobar  Contact: [phone]  General Message/Vendor Calls    Caller's first and last name: Pt.       Reason for call: Pt has called 4 times to schedule therapy with doctor pcp referred, has not heard back. Unsure if pt needed to call a different office, or if there is another contact number. Callback required yes/no and why: Yes, needs to schedule therapy.        Best contact number(s): [phone]      Message from [facility]

## 2021-08-12 ENCOUNTER — HOSPITAL ENCOUNTER (OUTPATIENT)
Dept: PHYSICAL THERAPY | Age: 71
Discharge: HOME | End: 2021-08-12
Payer: MEDICARE

## 2021-08-12 PROCEDURE — 97162 PT EVAL MOD COMPLEX 30 MIN: CPT | Performed by: PHYSICAL THERAPIST

## 2021-08-12 PROCEDURE — 97110 THERAPEUTIC EXERCISES: CPT | Performed by: PHYSICAL THERAPIST

## 2021-08-12 NOTE — PROGRESS NOTES
PT INITIAL EVALUATION NOTE - [facility] 2-15    Patient Name: Yadira Pastrana  Date:2021  : 1950  [x]  Patient  Verified  Payor: MEDICARE RAILROAD / Plan: Daniela Schwartz / Product Type: Medicare /    In time: 911  Out time: 955  Total Treatment Time (min): 44  Total Timed Codes (min): 24  1:1 Treatment Time (MC only): 24   Visit #: 1     Treatment Area: Low back pain [M54.5]    SUBJECTIVE  Pain Level (0-10 scale): 0  Any medication changes, allergies to medications, adverse drug reactions, diagnosis change, or new procedure performed?: [] No    [x] Yes (see summary sheet for update)  Subjective:    Pt reports a long history of back pain and osteoporosis. She states that she started to flare up a few weeks ago. She states that she first noticed the increase in pain during a weekend trip to [place] where she was walking a lot. Then last weekend she was lifting things to prepare for a birthday party. She denies any numbness or tingling. She also states a recent diagnosis of mitral valve regurgitation which has limited her tolerance for activity. She was once waling up to 5 miles easily and now it is a struggle to tolerate ~2 miles. She states that this has decreased her activity level and that has also hindered her ability to address the back issues. She is retired from teaching. She lives at home with her  in [address] [place] home with bedroom on the 2nd floor. She is able to negotiate steps but admits that it has gotten more difficult. She is able to manage her back pain with lying flat and taking tylenol arthritis. She is not using ice or heat for relief at this time. OBJECTIVE/EXAMINATION    OBJECTIVE    Posture:  Rounded shoulders forward head, sift hip to hip for comfort  Other Observations:  NA  Gait and Functional Mobility:  Toed in, bilateral hip drop  Palpation: non tender        Lumbar AROM:          R  L    Flexion     75%P!      Extension    100% relief    Side Bending   50%  50%    Rotation   75%  75%        LOWER QUARTER   MUSCLE STRENGTH  KEY       R  L  0 - No Contraction  L1, L2 Psoas  3  3  1 - Trace   L3 Quads  4  4  2 - Poor   L4 Tib Ant  3  3  3 - Fair    L5 EHL  NT  NT  4 - Good   S1 Peroneals  NT  NT  5 - Normal   S2 Hams  3  3    Flexibility: decreased right > left gastroc and hamstring  Mobility Assessment: NT      MMT:               HIP ER: 3              HIP Abd: 3  Neurological: Reflexes / Sensations: grossly intact  Special Tests:    Trendelenberg: +    FABERS: +   Forward Bend: +    Slump: -   H.S. SLR: +     Piriformis Ext: +   Long Sit: NT     Lumbar Distraction: NT   SI Compression/Distraction: -      24 min Therapeutic Exercise:  [x] See flow sheet :   Rationale: increase ROM, increase strength and improve coordination to improve the patients ability to complete all activity    With   [x] TE   [] TA   [] Neuro   [] SC   [] other: Patient Education: [x] Review HEP    [] Progressed/Changed HEP based on:   [x] positioning   [x] body mechanics   [x] transfers   [x] heat/ice application    [] other:      Other Objective/Functional Measures: FOTO Functional Measure: 72/100                         Pain Level (0-10 scale) post treatment: 0    ASSESSMENT/Changes in Function:     [x]  See Plan of Isaias, BEL 8/12/2021

## 2021-08-12 NOTE — PROGRESS NOTES
[facility]. Physical Therapy  [address] [facility] ([address] [place]  Phone: [phone] Fax: [phone]     Plan of Care/Statement of Necessity for Physical Therapy Services  2-15    Patient name: Arthur Haines  : 1950  Provider#: [id number]  Referral source: Monica Larkin MD      Medical/Treatment Diagnosis: Low back pain [M54.5]     Prior Hospitalization: see medical history     Comorbidities: athritiss, back pain, headaches, osteoporosis, visual impairment  Prior Level of Function: 20 min at least 3 times a week  Medications: Verified on Patient Summary List  Start of Care: 21      Onset Date: chronic   The Plan of Care and following information is based on the information from the initial evaluation. Assessment/ key information: Pt is a 77 yo female who is in today to begin therapy for LBP that she reports has been going on for a number of years but recently got worse starting in May. She complains of discomfort and stiffness in her low back. She also recently recevied a diagnosis of mitral valve regurgitation which has limited her ability to exercise at the same level that she used to. She feels that this has played a role in her increased symptoms as well. She presents with minor deficits in lumbar ROM and lower extremity strength. Pt is a good candidate for PT and will benefit from skilled services to address these deficits and work toward the goals listed below. Evaluation Complexity History HIGH Complexity :3+ comorbidities / personal factors will impact the outcome/ POC ; Examination HIGH Complexity : 4+ Standardized tests and measures addressing body structure, function, activity limitation and / or participation in recreation  ;Presentation MEDIUM Complexity : Evolving with changing characteristics  ; Clinical Decision Making MEDIUM Complexity : FOTO score of 26-74  Overall Complexity Rating: MEDIUM    Problem List: pain affecting function, decrease ROM, decrease strength, impaired gait/ balance, decrease ADL/ functional abilitiies, decrease activity tolerance, decrease flexibility/ joint mobility and decrease transfer abilities   Treatment Plan may include any combination of the following: Therapeutic exercise, Therapeutic activities, Neuromuscular re-education, Physical agent/modality, Gait/balance training, Manual therapy, Patient education, Self Care training, Functional mobility training, Home safety training and Stair training  Patient / Family readiness to learn indicated by: asking questions, trying to perform skills and interest  Persons(s) to be included in education: patient (P)  Barriers to Learning/Limitations: None  Patient Goal (s): to feel less pain  Patient Self Reported Health Status: fair  Rehabilitation Potential: fair    Short Term Goals: To be accomplished in 6-8 treatments:   Pt will be I with HEP  Pt will complain of pain 1-2/10 with all activity  Pt will increase ROM to complete all ADL's more efficiently  Pt will be able to maintain positions for 20 min without increased symptoms    Long Term Goals: To be accomplished in 14-16 treatments:   Pt will complain of pain 0-1/10 with all activity  Pt will increase strength to stabilize the lumbar spine and provide better support during all activity  Pt will increase FOTO score by 10 points to meet MDC and improve their function  Pt will decrease radicular symptoms by 100% to complete all activity  Pt will return to the most appropriate cardio activity without increased symptoms  Pt will be able to complete all household chores and ADL's without increased symptoms    Frequency / Duration: Patient to be seen 2 times per week for 16 treatments.     Patient/ Caregiver education and instruction: self care, activity modification and exercises    [x]  Plan of care has been reviewed with PTA        Certification Period: 8/12/21-11/10/21  Karissa Live, PT 8/12/2021     ________________________________________________________________________    I certify that the above Therapy Services are being furnished while the patient is under my care. I agree with the treatment plan and certify that this therapy is necessary.     Physician's Signature:____________________  Date:____________Time: _________         Reinier Newell MD

## 2021-08-17 ENCOUNTER — APPOINTMENT (OUTPATIENT)
Dept: PHYSICAL THERAPY | Age: 71
End: 2021-08-17
Payer: MEDICARE

## 2021-08-19 ENCOUNTER — APPOINTMENT (OUTPATIENT)
Dept: PHYSICAL THERAPY | Age: 71
End: 2021-08-19
Payer: MEDICARE

## 2021-08-24 ENCOUNTER — HOSPITAL ENCOUNTER (OUTPATIENT)
Dept: PHYSICAL THERAPY | Age: 71
Discharge: HOME | End: 2021-08-24
Payer: MEDICARE

## 2021-08-24 PROCEDURE — 97110 THERAPEUTIC EXERCISES: CPT

## 2021-08-24 NOTE — PROGRESS NOTES
PT DAILY TREATMENT NOTE - [facility] 2-15    Patient Name: Ana María Alcazar  Date:2021  : 1950  [x]  Patient  Verified  Payor: MEDICARE RAILROAD / Plan: Prasanth Blind / Product Type: Medicare /    In time:905  Out time:1013  Total Treatment Time (min): 68  Total Timed Codes (min): 63  1:1 Treatment Time ( only): 63   Visit #:  2    Treatment Area: Low back pain [M54.5]    SUBJECTIVE  Pain Level (0-10 scale): 0/10  Any medication changes, allergies to medications, adverse drug reactions, diagnosis change, or new procedure performed?: [x] No    [] Yes (see summary sheet for update)  Subjective functional status/changes:   [] No changes reported  Patient reports she typically feels the stiffest when she first wakes up, but will loosen up after a few minutes. OBJECTIVE    68 min Therapeutic Exercise:  [x] See flow sheet :   Rationale: increase ROM and increase strength to improve the patients ability to perform ADLs and reduce pain levels    With   [] TE   [] TA   [] Neuro   [] SC   [] other: Patient Education: [x] Review HEP    [] Progressed/Changed HEP based on:   [] positioning   [] body mechanics   [] transfers   [] heat/ice application    [] other:      Other Objective/Functional Measures: none noted     Pain Level (0-10 scale) post treatment: 0/10    ASSESSMENT/Changes in Function:     Patient tolerated all therex, will continue to progress as tolerated. Patient will continue to benefit from skilled PT services to modify and progress therapeutic interventions, address functional mobility deficits, address ROM deficits, address strength deficits, analyze and address soft tissue restrictions, analyze and cue movement patterns, analyze and modify body mechanics/ergonomics and assess and modify postural abnormalities to attain remaining goals.      [x]  See Plan of Care  []  See progress note/recertification  []  See Discharge Summary         Progress towards goals / Updated goals:  Patient is progressing towards goals.      PLAN  [x]  Upgrade activities as tolerated     [x]  Continue plan of care  [x]  Update interventions per flow sheet       []  Discharge due to:_  []  Other:_      Natalie Wood, PTA 8/24/2021

## 2021-08-26 ENCOUNTER — HOSPITAL ENCOUNTER (OUTPATIENT)
Dept: PHYSICAL THERAPY | Age: 71
Discharge: HOME | End: 2021-08-26
Payer: MEDICARE

## 2021-08-26 PROCEDURE — 97110 THERAPEUTIC EXERCISES: CPT

## 2021-08-26 NOTE — PROGRESS NOTES
PT DAILY TREATMENT NOTE - [facility] 2-15    Patient Name: Hubert Sarkar  Date:2021  : 1950  [x]  Patient  Verified  Payor: MEDICARE RAILROAD / Plan: Shireen Boo / Product Type: Medicare /    In time:933  Out time:1014  Total Treatment Time (min): 41  Total Timed Codes (min): 36  1:1 Treatment Time ( only): 36   Visit #:  3    Treatment Area: Low back pain [M54.5]    SUBJECTIVE  Pain Level (0-10 scale): 5/10  Any medication changes, allergies to medications, adverse drug reactions, diagnosis change, or new procedure performed?: [x] No    [] Yes (see summary sheet for update)  Subjective functional status/changes:   [] No changes reported  Patient reports last night she was performing her HEP on her bed last night, and when she was performing the bridges she experienced increased pain in her back. It has been slightly irritated     OBJECTIVE    41 min Therapeutic Exercise:  [x] See flow sheet :   Rationale: increase ROM and increase strength to improve the patients ability to perform ADLs and reduce pain levels    With   [] TE   [] TA   [] Neuro   [] SC   [] other: Patient Education: [x] Review HEP    [] Progressed/Changed HEP based on:   [] positioning   [] body mechanics   [] transfers   [] heat/ice application    [] other:      Other Objective/Functional Measures: none noted     Pain Level (0-10 scale) post treatment: 0/10    ASSESSMENT/Changes in Function:     Cued to activated the core and glutes while performing bridges which assited in reducing pain. Patient tolerated all therex, will continue to progress as tolerated.    Patient will continue to benefit from skilled PT services to modify and progress therapeutic interventions, address functional mobility deficits, address ROM deficits, address strength deficits, analyze and address soft tissue restrictions, analyze and cue movement patterns, analyze and modify body mechanics/ergonomics and assess and modify postural abnormalities to attain remaining goals. [x]  See Plan of Care  []  See progress note/recertification  []  See Discharge Summary         Progress towards goals / Updated goals:  Patient is progressing towards goals.      PLAN  [x]  Upgrade activities as tolerated     [x]  Continue plan of care  [x]  Update interventions per flow sheet       []  Discharge due to:_  []  Other:_      Leoma Sandifer, PTA 8/26/2021

## 2021-08-27 ENCOUNTER — ANCILLARY PROCEDURE (OUTPATIENT)
Dept: CARDIOLOGY CLINIC | Age: 71
End: 2021-08-27
Payer: MEDICARE

## 2021-08-27 VITALS
DIASTOLIC BLOOD PRESSURE: 80 MMHG | WEIGHT: 189 LBS | BODY MASS INDEX: 30.37 KG/M2 | HEIGHT: 66 IN | SYSTOLIC BLOOD PRESSURE: 128 MMHG

## 2021-08-27 DIAGNOSIS — R06.09 DOE (DYSPNEA ON EXERTION): ICD-10-CM

## 2021-08-27 LAB
STRESS BASELINE DIAS BP: 80 MMHG
STRESS BASELINE HR: 65 BPM
STRESS BASELINE SYS BP: 155 MMHG
STRESS ESTIMATED WORKLOAD: 7 METS
STRESS EXERCISE DUR MIN: NORMAL MIN:SEC
STRESS O2 SAT PEAK: 98 %
STRESS O2 SAT REST: 98 %
STRESS PEAK DIAS BP: 85 MMHG
STRESS PEAK SYS BP: 205 MMHG
STRESS PERCENT HR ACHIEVED: 90 %
STRESS POST PEAK HR: 137 BPM
STRESS RATE PRESSURE PRODUCT: NORMAL BPM*MMHG
STRESS ST DEPRESSION: 0 MM
STRESS ST ELEVATION: 0 MM
STRESS STAGE 1 BP: NORMAL MMHG
STRESS STAGE 1 DURATION: NORMAL MIN:SEC
STRESS STAGE 1 HR: 122 BPM
STRESS STAGE 2 BP: NORMAL MMHG
STRESS STAGE 2 DURATION: NORMAL MIN:SEC
STRESS STAGE 2 HR: 134 BPM
STRESS STAGE 3 DURATION: NORMAL MIN:SEC
STRESS STAGE 3 HR: 136 BPM
STRESS STAGE RECOVERY 1 BP: NORMAL MMHG
STRESS STAGE RECOVERY 1 DURATION: NORMAL MIN:SEC
STRESS STAGE RECOVERY 1 HR: 89 BPM
STRESS TARGET HR: 152 BPM

## 2021-08-27 PROCEDURE — 93351 STRESS TTE COMPLETE: CPT | Performed by: INTERNAL MEDICINE

## 2021-08-31 ENCOUNTER — APPOINTMENT (OUTPATIENT)
Dept: PHYSICAL THERAPY | Age: 71
End: 2021-08-31
Payer: MEDICARE

## 2021-09-02 ENCOUNTER — APPOINTMENT (OUTPATIENT)
Dept: PHYSICAL THERAPY | Age: 71
End: 2021-09-02

## 2021-09-07 ENCOUNTER — APPOINTMENT (OUTPATIENT)
Dept: PHYSICAL THERAPY | Age: 71
End: 2021-09-07

## 2021-09-09 RX ORDER — LOSARTAN POTASSIUM 100 MG/1
TABLET ORAL
Qty: 90 TABLET | Refills: 0 | Status: SHIPPED | OUTPATIENT
Start: 2021-09-09 | End: 2021-12-08 | Stop reason: SDUPTHER

## 2021-10-25 ENCOUNTER — TRANSCRIBE ORDER (OUTPATIENT)
Dept: SCHEDULING | Age: 71
End: 2021-10-25

## 2021-10-25 DIAGNOSIS — Z12.31 VISIT FOR SCREENING MAMMOGRAM: Primary | ICD-10-CM

## 2021-10-31 RX ORDER — HYDROCHLOROTHIAZIDE 25 MG/1
TABLET ORAL
Qty: 90 TABLET | Refills: 0 | Status: SHIPPED | OUTPATIENT
Start: 2021-10-31 | End: 2022-02-03

## 2021-10-31 RX ORDER — PANTOPRAZOLE SODIUM 40 MG/1
TABLET, DELAYED RELEASE ORAL
Qty: 180 TABLET | Refills: 1 | Status: SHIPPED | OUTPATIENT
Start: 2021-10-31

## 2021-11-30 ENCOUNTER — HOSPITAL ENCOUNTER (OUTPATIENT)
Dept: MAMMOGRAPHY | Age: 71
Discharge: HOME | End: 2021-11-30
Attending: INTERNAL MEDICINE
Payer: MEDICARE

## 2021-11-30 DIAGNOSIS — Z12.31 VISIT FOR SCREENING MAMMOGRAM: ICD-10-CM

## 2021-11-30 PROCEDURE — 77067 SCR MAMMO BI INCL CAD: CPT

## 2021-12-07 NOTE — PROGRESS NOTES
HISTORY OF PRESENT ILLNESS  Glenna Gray is a 73 y.o. female. HPI     Last here 6/09/21. Pt is here for routine care.     BP today is 133/82  BP at home 129-130/70-80  Pt is on losartan 100 mg and hctz 25 mg now separate    Recall pt had a cough on lisinopril. She was taking this medication for 20 years.     Wt today is 183 lbs, down 11 lbs x lov   Discussed diet and wt/l  She tried to see nutritionist, but this was too expensive  She is working on getting rid of sodas    Reviewed labs   Will get labs today    She c/o knee pain lov, she went to PT for this  This helped somewhat, but she still has feeling of her knee going out sometimes  Reviewed xr R knee 6/15/21:   No acute abnormality     Pt follows with Dr. Polo Bolaños (GI)   Last visit was 8/17  Pt takes protonix only once daily now-- helps gerd/reflux  Recall she previously followed with Dr. Polo Bolaños about her stomach and episodes of diarrhea. Her stomach pain has resolved, continues on prevacid for this. Previously took amitiza prn but no longer on this, had hemorrhoid surgery. Doing well       Pt was referred to Dr. Ellis Mcgarry (cardio) went there for mitral valve regurgitation  Reviewed note 7/01/21:  1. Nonrheumatic mitral valve regurgitation  Echo done 6/2021 with preserved LVEF 60-65%, with mild-mod MR  Previous echo in 11/2017 with mild MR, EF 55-60%  Discussed progression  Continue afterload reduction for now  2. Pure hypercholesterolemia  LDL 74 in 6/2021  Continue statin therapy and low fat, low cholesterol diet  Lipids managed by PCP  3. Essential hypertension with goal blood pressure less than 130/85  BP controlled. Continue anti-hypertensive therapy and low sodium diet   Counseled on diet and exercise- eventual goal of 30-60 minutes 5-7 times a week as per AHA guidelines. F/u with Dr. Ellis Mcgarry in 1 year with echo before. Patient seen and examined by me with the above nurse practitioner.   I personally performed all components of the history, physical, and medical decision making and agree with the assessment and plan with minor modifications as noted. Today the patient presents with slight progression of mitral regurgitation, stable blood pressure, and hyperlipidemia.   Reviewed stress echo 21:  · Baseline ECG: Normal sinus rhythm. · Stress test: Negative stress test.  · Echo: Normal stress echocardiogram.  She will be getting another echo       She reports being told to take allegra instead of zyrtec since she was feeling tired  Discussed that zyrtec is sedating  Discussed getting zyrtec in bulk might be cheaper       Continues on zocor 40mg daily for cholesterol      Has xanax to use prn but has not used this recently      She was given oral estrogen from gyn for hot flashes, this has helped    Reviewed mammo 21: negative       Recall echo : 60 - 65%. Visually measured ejection fraction. Normal cavity size and wall thickness. · PA: Pulmonary arterial systolic pressure is 34 mmHg. · TV: Mild tricuspid valve regurgitation is present. MV: Mild to moderate mitral valve regurgitation is present. Regurgitation has increased since last echo, discussed f/u with cardio      ACP not on file. SDM is her  Kameron Lorenzo).   Provided this info in the past     PREVENTIVE:  Colonoscopy:  with Dr. Alexandre Virgen, 10 yr f/u  EGD: 18 with Dr. Alexandre Virgen, discussed f/u for repeat  AAA: [facility] [place] [facility]  Pap: Dr. Beulah Connolly, 3/21  Mammogram:21 negative  DEXA: 21, mild osteopenia   Tdap:   Pneumovax: 2017  Ktpnjcm40: 18   Shingles: declines  Flu shot: declines    A1c: 10/16 6.4,  6.1,  6.0,  6.0,  6.0  6.0  6.2  6.0  5.9  Eye exam: Dr. Dina Delgado,  has eye drops for glaucoma, scheduled   Hep C screen:  negative  Lipids:  LDL 74  EK/3/17, nsr with cardio  Covid: both (moderna), booster scheduled for tomorrow 21    Patient Active Problem List    Diagnosis Date Noted    Nonrheumatic mitral valve regurgitation 07/01/2021    Essential hypertension with goal blood pressure less than 130/85 08/03/2016    Pure hypercholesterolemia 08/03/2016    Osteopenia 03/23/2012    Constipation 08/05/2011    Glaucoma 08/05/2011    Vertigo 08/05/2011    Headache(784.0) 06/27/2011    Esophagitis 03/22/2011     Current Outpatient Medications   Medication Sig Dispense Refill    simvastatin (ZOCOR) 40 mg tablet Take 1 Tablet by mouth daily. 90 Tablet 1    losartan (COZAAR) 100 mg tablet Take 1 Tablet by mouth daily. 90 Tablet 1    pantoprazole (PROTONIX) 40 mg tablet TAKE 1 TABLET BY MOUTH ONCE DAILY. 180 Tablet 1    hydroCHLOROthiazide (HYDRODIURIL) 25 mg tablet TAKE 1 TABLET BY MOUTH EVERY DAY 90 Tablet 0    acetaminophen (Tylenol Arthritis Pain) 650 mg TbER Take 650 mg by mouth every eight (8) hours.  timolol (TIMOPTIC) 0.5 % ophthalmic solution Administer 1 Drop to both eyes two (2) times a day.  polyethylene glycol (MIRALAX) 17 gram packet Take 17 g by mouth daily.  estradioL (ESTRACE) 0.5 mg tablet Take 0.5 mg by mouth daily. (Patient not taking: Reported on 12/8/2021)      albuterol (PROAIR HFA) 90 mcg/actuation inhaler Take 1 Puff by inhalation every four (4) hours as needed for Wheezing or Shortness of Breath. (Patient not taking: Reported on 12/8/2021) 1 Inhaler 1    fluticasone (FLONASE ALLERGY RELIEF) 50 mcg/actuation nasal spray 2 Sprays by Both Nostrils route daily.  (Patient not taking: Reported on 12/8/2021)       Past Surgical History:   Procedure Laterality Date    COLONOSCOPY N/A 2/28/2018    COLONOS[facility] performed [facility] Sabina [facility]as [facility] ENDOSCOPY    HX BREAST BIOPSY Left 1980s    benign cyst    HX GI      hemmorhoidectomy    HX HYSTERECTOMY      HX OTHER SURGICAL      \"hemrrhoid surgery\"    HI BREAST SURGERY PROCEDURE UNLISTED        Lab Results   Component Value Date/Time    WBC 4.7 06/15/2021 09:58 AM    HGB 11.6 06/15/2021 09:58 AM    HCT 36.1 06/15/2021 09:58 AM    PLATELET 504 67/90/5313 09:58 AM    MCV 94.8 06/15/2021 09:58 AM     Lab Results   Component Value Date/Time    Cholesterol, total 158 06/15/2021 09:58 AM    HDL Cholesterol 68 06/15/2021 09:58 AM    LDL, calculated 74.8 06/15/2021 09:58 AM    Triglyceride 76 06/15/2021 09:58 AM    CHOL/HDL Ratio 2.3 06/15/2021 09:58 AM     Lab Results   Component Value Date/Time    GFR est non-AA >60 06/15/2021 09:58 AM    GFR est AA >60 06/15/2021 09:58 AM    Creatinine 0.83 06/15/2021 09:58 AM    BUN 17 06/15/2021 09:58 AM    Sodium 138 06/15/2021 09:58 AM    Potassium 4.0 06/15/2021 09:58 AM    Chloride 104 06/15/2021 09:58 AM    CO2 27 06/15/2021 09:58 AM    Magnesium 1.9 09/03/2014 10:55 AM        Review of Systems   Respiratory: Negative for shortness of breath. Cardiovascular: Negative for chest pain. Physical Exam  Constitutional:       General: She is not in acute distress. Appearance: Normal appearance. She is not ill-appearing, toxic-appearing or diaphoretic. HENT:      Head: Normocephalic and atraumatic. Right Ear: External ear normal.      Left Ear: External ear normal.   Eyes:      General:         Right eye: No discharge. Left eye: No discharge. Conjunctiva/sclera: Conjunctivae normal.      Pupils: Pupils are equal, round, and reactive to light. Cardiovascular:      Rate and Rhythm: Normal rate and regular rhythm. Heart sounds: Murmur heard. No friction rub. No gallop. Comments: 2/6  Pulmonary:      Effort: No respiratory distress. Breath sounds: Normal breath sounds. No wheezing or rales. Chest:      Chest wall: No tenderness. Musculoskeletal:         General: Normal range of motion. Cervical back: Normal range of motion and neck supple. Skin:     General: Skin is warm and dry. Neurological:      Mental Status: She is alert and oriented to person, place, and time. Mental status is at baseline.       Coordination: Coordination normal. Gait: Gait normal.   Psychiatric:         Mood and Affect: Mood normal.         Behavior: Behavior normal.         ASSESSMENT and PLAN    ICD-10-CM ICD-9-CM    1. Essential hypertension with goal blood pressure less than 130/85  V20 722.1 METABOLIC PANEL, BASIC         Controlled losartan hydrochlorothiazide continue    Check metabolic panel   METABOLIC PANEL, BASIC   2. IFG (impaired fasting glucose)  R73.01 790.21 HEMOGLOBIN A1C WITH EAG      METABOLIC PANEL, BASIC   Diet controlled   HEMOGLOBIN A1C WITH EAG      METABOLIC PANEL, BASIC   3. Pure hypercholesterolemia     Controlled on Zocor 40 mg continue     E78.00 272.0    4. Chronic pain of right knee  M25.561 719.46 REFERRAL TO ORTHOPEDICS   Completed physical therapy legs are giving out for orthopedics G89.29 338.29    5. Osteopenia of multiple sites       Vitamin D for treatment repeat bone density     M85.89 733.90    6. Nonrheumatic mitral valve regurgitation       Now following with cardiology repeat echo pending     I34.0 424.0    7. Esophagitis       Improved Protonix still with some symptoms will get endoscopy send to GI K20.90 530.10 REFERRAL TO GASTROENTEROLOGY   Chronic cough is allergy induced discussed Allegra    Depression screen reviewed and negative     Scribed by Marika Ly, as dictated by Dr. Graeme Randall. Current diagnosis and concerns discussed with pt at length. Pt understands risks and benefits or current treatment plan and medications, and accepts the treatment and medication with any possible risks. Pt asks appropriate questions, which were answered. Pt was instructed to call with any concerns or problems. I have reviewed the note documented by the scribe. The services provided are my own.   The documentation is accurate

## 2021-12-08 ENCOUNTER — OFFICE VISIT (OUTPATIENT)
Dept: INTERNAL MEDICINE CLINIC | Age: 71
End: 2021-12-08
Payer: MEDICARE

## 2021-12-08 VITALS
WEIGHT: 193.8 LBS | DIASTOLIC BLOOD PRESSURE: 82 MMHG | SYSTOLIC BLOOD PRESSURE: 133 MMHG | HEIGHT: 66 IN | OXYGEN SATURATION: 100 % | TEMPERATURE: 97.4 F | BODY MASS INDEX: 31.15 KG/M2 | RESPIRATION RATE: 16 BRPM | HEART RATE: 71 BPM

## 2021-12-08 DIAGNOSIS — M85.89 OSTEOPENIA OF MULTIPLE SITES: ICD-10-CM

## 2021-12-08 DIAGNOSIS — I10 ESSENTIAL HYPERTENSION WITH GOAL BLOOD PRESSURE LESS THAN 130/85: Primary | ICD-10-CM

## 2021-12-08 DIAGNOSIS — K20.90 ESOPHAGITIS: ICD-10-CM

## 2021-12-08 DIAGNOSIS — E78.00 PURE HYPERCHOLESTEROLEMIA: ICD-10-CM

## 2021-12-08 DIAGNOSIS — I34.0 NONRHEUMATIC MITRAL VALVE REGURGITATION: ICD-10-CM

## 2021-12-08 DIAGNOSIS — G89.29 CHRONIC PAIN OF RIGHT KNEE: ICD-10-CM

## 2021-12-08 DIAGNOSIS — M25.561 CHRONIC PAIN OF RIGHT KNEE: ICD-10-CM

## 2021-12-08 DIAGNOSIS — R73.01 IFG (IMPAIRED FASTING GLUCOSE): ICD-10-CM

## 2021-12-08 LAB
ANION GAP SERPL CALC-SCNC: 4 MMOL/L (ref 5–15)
BUN SERPL-MCNC: 16 MG/DL (ref 6–20)
BUN/CREAT SERPL: 18 (ref 12–20)
CALCIUM SERPL-MCNC: 10.2 MG/DL (ref 8.5–10.1)
CHLORIDE SERPL-SCNC: 107 MMOL/L (ref 97–108)
CO2 SERPL-SCNC: 28 MMOL/L (ref 21–32)
CREAT SERPL-MCNC: 0.87 MG/DL (ref 0.55–1.02)
EST. AVERAGE GLUCOSE BLD GHB EST-MCNC: 120 MG/DL
GLUCOSE SERPL-MCNC: 102 MG/DL (ref 65–100)
HBA1C MFR BLD: 5.8 % (ref 4–5.6)
POTASSIUM SERPL-SCNC: 4.7 MMOL/L (ref 3.5–5.1)
SODIUM SERPL-SCNC: 139 MMOL/L (ref 136–145)

## 2021-12-08 PROCEDURE — G8510 SCR DEP NEG, NO PLAN REQD: HCPCS | Performed by: INTERNAL MEDICINE

## 2021-12-08 PROCEDURE — G8754 DIAS BP LESS 90: HCPCS | Performed by: INTERNAL MEDICINE

## 2021-12-08 PROCEDURE — G8752 SYS BP LESS 140: HCPCS | Performed by: INTERNAL MEDICINE

## 2021-12-08 PROCEDURE — 99214 OFFICE O/P EST MOD 30 MIN: CPT | Performed by: INTERNAL MEDICINE

## 2021-12-08 PROCEDURE — G8427 DOCREV CUR MEDS BY ELIG CLIN: HCPCS | Performed by: INTERNAL MEDICINE

## 2021-12-08 PROCEDURE — G9899 SCRN MAM PERF RSLTS DOC: HCPCS | Performed by: INTERNAL MEDICINE

## 2021-12-08 PROCEDURE — G0463 HOSPITAL OUTPT CLINIC VISIT: HCPCS | Performed by: INTERNAL MEDICINE

## 2021-12-08 PROCEDURE — G8536 NO DOC ELDER MAL SCRN: HCPCS | Performed by: INTERNAL MEDICINE

## 2021-12-08 PROCEDURE — 1101F PT FALLS ASSESS-DOCD LE1/YR: CPT | Performed by: INTERNAL MEDICINE

## 2021-12-08 PROCEDURE — 1090F PRES/ABSN URINE INCON ASSESS: CPT | Performed by: INTERNAL MEDICINE

## 2021-12-08 PROCEDURE — 3017F COLORECTAL CA SCREEN DOC REV: CPT | Performed by: INTERNAL MEDICINE

## 2021-12-08 PROCEDURE — G8399 PT W/DXA RESULTS DOCUMENT: HCPCS | Performed by: INTERNAL MEDICINE

## 2021-12-08 PROCEDURE — G8417 CALC BMI ABV UP PARAM F/U: HCPCS | Performed by: INTERNAL MEDICINE

## 2021-12-08 RX ORDER — LOSARTAN POTASSIUM 100 MG/1
100 TABLET ORAL DAILY
Qty: 90 TABLET | Refills: 1 | Status: SHIPPED | OUTPATIENT
Start: 2021-12-08 | End: 2022-06-19

## 2021-12-08 RX ORDER — SIMVASTATIN 40 MG/1
40 TABLET, FILM COATED ORAL DAILY
Qty: 90 TABLET | Refills: 1 | Status: SHIPPED | OUTPATIENT
Start: 2021-12-08 | End: 2022-06-03

## 2022-02-03 RX ORDER — HYDROCHLOROTHIAZIDE 25 MG/1
TABLET ORAL
Qty: 90 TABLET | Refills: 0 | Status: SHIPPED | OUTPATIENT
Start: 2022-02-03 | End: 2022-05-04

## 2022-02-23 ENCOUNTER — TELEPHONE (OUTPATIENT)
Dept: INTERNAL MEDICINE CLINIC | Age: 72
End: 2022-02-23

## 2022-02-23 NOTE — TELEPHONE ENCOUNTER
Called, spoke with Pt. Two pt identifiers confirmed. Pt given contact number to Dr. Aye Sanford office. Pt verbalized understanding of information discussed w/ no further questions at this time.

## 2022-02-23 NOTE — TELEPHONE ENCOUNTER
#[phone]  Pt states that the medication for her stomach/acid reflux is no longer working. Pt states she went to the ED as she can't get into gastro for four months. Pt needs a referral as to who she can see to get her in much sooner than four months away. Please call to see how you can help Mrs. Saad Carias. Thanks.

## 2022-03-04 DIAGNOSIS — M25.562 LEFT KNEE PAIN, UNSPECIFIED CHRONICITY: Primary | ICD-10-CM

## 2022-03-04 DIAGNOSIS — M25.561 RIGHT KNEE PAIN, UNSPECIFIED CHRONICITY: ICD-10-CM

## 2022-03-07 ENCOUNTER — HOSPITAL ENCOUNTER (OUTPATIENT)
Dept: GENERAL RADIOLOGY | Age: 72
Discharge: HOME | End: 2022-03-07
Payer: MEDICARE

## 2022-03-07 DIAGNOSIS — M25.561 RIGHT KNEE PAIN, UNSPECIFIED CHRONICITY: ICD-10-CM

## 2022-03-07 DIAGNOSIS — M25.562 LEFT KNEE PAIN, UNSPECIFIED CHRONICITY: ICD-10-CM

## 2022-03-07 PROCEDURE — 73564 X-RAY EXAM KNEE 4 OR MORE: CPT

## 2022-03-09 ENCOUNTER — OFFICE VISIT (OUTPATIENT)
Dept: ORTHOPEDIC SURGERY | Age: 72
End: 2022-03-09
Payer: MEDICARE

## 2022-03-09 VITALS
WEIGHT: 200 LBS | HEART RATE: 67 BPM | SYSTOLIC BLOOD PRESSURE: 150 MMHG | DIASTOLIC BLOOD PRESSURE: 76 MMHG | OXYGEN SATURATION: 99 % | BODY MASS INDEX: 32.14 KG/M2 | HEIGHT: 66 IN | TEMPERATURE: 96.2 F

## 2022-03-09 DIAGNOSIS — M17.0 BILATERAL PRIMARY OSTEOARTHRITIS OF KNEE: ICD-10-CM

## 2022-03-09 DIAGNOSIS — M25.561 PAIN IN BOTH KNEES, UNSPECIFIED CHRONICITY: Primary | ICD-10-CM

## 2022-03-09 DIAGNOSIS — M25.562 PAIN IN BOTH KNEES, UNSPECIFIED CHRONICITY: Primary | ICD-10-CM

## 2022-03-09 PROCEDURE — G8399 PT W/DXA RESULTS DOCUMENT: HCPCS | Performed by: ORTHOPAEDIC SURGERY

## 2022-03-09 PROCEDURE — G8417 CALC BMI ABV UP PARAM F/U: HCPCS | Performed by: ORTHOPAEDIC SURGERY

## 2022-03-09 PROCEDURE — 3017F COLORECTAL CA SCREEN DOC REV: CPT | Performed by: ORTHOPAEDIC SURGERY

## 2022-03-09 PROCEDURE — 1090F PRES/ABSN URINE INCON ASSESS: CPT | Performed by: ORTHOPAEDIC SURGERY

## 2022-03-09 PROCEDURE — G8754 DIAS BP LESS 90: HCPCS | Performed by: ORTHOPAEDIC SURGERY

## 2022-03-09 PROCEDURE — 99203 OFFICE O/P NEW LOW 30 MIN: CPT | Performed by: ORTHOPAEDIC SURGERY

## 2022-03-09 PROCEDURE — G8427 DOCREV CUR MEDS BY ELIG CLIN: HCPCS | Performed by: ORTHOPAEDIC SURGERY

## 2022-03-09 PROCEDURE — G8536 NO DOC ELDER MAL SCRN: HCPCS | Performed by: ORTHOPAEDIC SURGERY

## 2022-03-09 PROCEDURE — G8510 SCR DEP NEG, NO PLAN REQD: HCPCS | Performed by: ORTHOPAEDIC SURGERY

## 2022-03-09 PROCEDURE — G9899 SCRN MAM PERF RSLTS DOC: HCPCS | Performed by: ORTHOPAEDIC SURGERY

## 2022-03-09 PROCEDURE — 1101F PT FALLS ASSESS-DOCD LE1/YR: CPT | Performed by: ORTHOPAEDIC SURGERY

## 2022-03-09 PROCEDURE — G8753 SYS BP > OR = 140: HCPCS | Performed by: ORTHOPAEDIC SURGERY

## 2022-03-09 PROCEDURE — 20610 DRAIN/INJ JOINT/BURSA W/O US: CPT | Performed by: ORTHOPAEDIC SURGERY

## 2022-03-09 RX ORDER — BETAMETHASONE SODIUM PHOSPHATE AND BETAMETHASONE ACETATE 3; 3 MG/ML; MG/ML
6 INJECTION, SUSPENSION INTRA-ARTICULAR; INTRALESIONAL; INTRAMUSCULAR; SOFT TISSUE ONCE
Status: COMPLETED | OUTPATIENT
Start: 2022-03-09 | End: 2022-03-09

## 2022-03-09 RX ADMIN — BETAMETHASONE SODIUM PHOSPHATE AND BETAMETHASONE ACETATE 6 MG: 3; 3 INJECTION, SUSPENSION INTRA-ARTICULAR; INTRALESIONAL; INTRAMUSCULAR; SOFT TISSUE at 16:08

## 2022-03-09 RX ADMIN — BETAMETHASONE SODIUM PHOSPHATE AND BETAMETHASONE ACETATE 6 MG: 3; 3 INJECTION, SUSPENSION INTRA-ARTICULAR; INTRALESIONAL; INTRAMUSCULAR; SOFT TISSUE at 16:09

## 2022-03-09 NOTE — PROGRESS NOTES
Identified pt with two pt identifiers (name and ). Reviewed chart in preparation for visit and have obtained necessary documentation. Vincent Kyle is a 73 y.o. female  Chief Complaint   Patient presents with    Knee Pain     Bilateral knee     Visit Vitals  BP (!) 150/76 (BP 1 Location: Right arm, BP Patient Position: Sitting, BP Cuff Size: Large adult)   Pulse 67   Temp (!) 96.2 °F (35.7 °C) (Tympanic)   Ht 5' 6\" (1.676 m)   Wt 200 lb (90.7 kg)   SpO2 99%   BMI 32.28 kg/m²     1. Have you been to the ER, urgent care clinic since your last visit? Hospitalized since your last visit? No    2. Have you seen or consulted any other health care providers outside of the [address] [place] [address] since your last visit? Include any pap smears or colon screening.  No

## 2022-03-09 NOTE — LETTER
3/9/2022    Patient: Kate Guerrero   YOB: 1950   Date of Visit: 3/9/2022     David Fortune MD  [address]  [address] [facility] [address]  [facility] [address]  [address]    Dear David Fortune MD,      Thank you for referring Ms. Teri Sanchze to [facility] for evaluation. My notes for this consultation are attached. If you have questions, please do not hesitate to call me. I look forward to following your patient along with you.       Sincerely,    Sathya Guzman, DO

## 2022-03-10 NOTE — PROGRESS NOTES
3/9/2022    Chief Complaint: Bilateral knee pain    Assessment: Osteoarthritis bilateral knee    Plan: This patient and I did discuss the many options in treating knee osteoarthritis. We did discuss that we could continue to seek out nonoperative modalities, such as: NSAIDs, oral and topical analgesics, knee injections, knee braces, physical therapy, stretching, strengthening, and weight loss strategies, activity modification, ambulatory assistive devices. The patient stated their understanding with this, but and would like to proceed with nonsurgical management in the form of injections. HPI: This is a 73 y.o. female who complains of bilateral knee pain. neither side is worse than the other side. Onset was gradual.  The patient has had activity dependent pain for years. The patient has tried activity modification, physical therapy exercises, injections have not been attempted. The pain is in the lateral knees, it is severe in intensity. The patient feels unstable with the knee, fears falling, and has significant limitation with activities of daily living, recreation, and walks with a limp.     Past Medical History:   Diagnosis Date    Arthritis     narrowing of spine    Environmental and seasonal allergies     GERD - Esophagitis - reflux 3/22/2011    Headache(784.0) 6/27/2011    Hypercholesterolemia 3/22/2011    Hypertension 3/22/2011    Other ill-defined conditions(799.89)     high cholesterol, vertigo, migraine    Ulcer        Past Surgical History:   Procedure Laterality Date    COLONOSCOPY N/A 2/28/2018    COLONOSCOPY performed by Yadira Sharma MD at \[facility]\"" ENDOSCOPY    HX BREAST BIOPSY Left 1980s    benign cyst    HX GI      hemmorhoidectomy    HX HYSTERECTOMY      HX OTHER SURGICAL      \"hemrrhoid surgery\"    MO BREAST SURGERY PROCEDURE UNLISTED         Current Outpatient Medications on File Prior to Visit   Medication Sig Dispense Refill    hydroCHLOROthiazide (HYDRODIURIL) 25 mg tablet TAKE 1 TABLET BY MOUTH EVERY DAY 90 Tablet 0    simvastatin (ZOCOR) 40 mg tablet Take 1 Tablet by mouth daily. 90 Tablet 1    losartan (COZAAR) 100 mg tablet Take 1 Tablet by mouth daily. 90 Tablet 1    pantoprazole (PROTONIX) 40 mg tablet TAKE 1 TABLET BY MOUTH ONCE DAILY. 180 Tablet 1    acetaminophen (Tylenol Arthritis Pain) 650 mg TbER Take 650 mg by mouth every eight (8) hours.  timolol (TIMOPTIC) 0.5 % ophthalmic solution Administer 1 Drop to both eyes two (2) times a day.  polyethylene glycol (MIRALAX) 17 gram packet Take 17 g by mouth daily.  estradioL (ESTRACE) 0.5 mg tablet Take 0.5 mg by mouth daily. (Patient not taking: Reported on 12/8/2021)      albuterol (PROAIR HFA) 90 mcg/actuation inhaler Take 1 Puff by inhalation every four (4) hours as needed for Wheezing or Shortness of Breath. (Patient not taking: Reported on 12/8/2021) 1 Inhaler 1    fluticasone (FLONASE ALLERGY RELIEF) 50 mcg/actuation nasal spray 2 Sprays by Both Nostrils route daily. (Patient not taking: Reported on 12/8/2021)       No current facility-administered medications on file prior to visit.        No Known Allergies    Family History   Problem Relation Age of Onset    Hypertension Mother     Hypertension Father        Social History     Socioeconomic History    Marital status:    Tobacco Use    Smoking status: Never Smoker    Smokeless tobacco: Never Used   Substance and Sexual Activity    Alcohol use: No    Drug use: No         Review of Systems:       General: Denies headache, lethargy, fever, weight loss  Ears/Nose/Throat: Denies ear discharge, drainage, nosebleeds, hoarse voice, dental problems  Cardiovascular: Denies chest pain, shortness of breath  Lungs: Denies chest pain, breathing problems, wheezing, pneumonia  Stomach: Denies stomach pain, heartburn, constipation, irritable bowel  Skin: Denies rash, sores, open wounds  Musculoskeletal: Admits to bilateral knee pain  Genitourinary: Denies dysuria, hematuria, polyuria  Gastrointestinal: Denies constipation, obstipation, diarrhea  Neurological: Denies changes in sight, smell, hearing, taste, seizures. Denies loss of consciousness. Psychiatric: Denies depression, sleep pattern changes, anxiety, change in personality  Endocrine: Denies mood swings, heat or cold intolerance  Hematologic/Lymphatic: Denies anemia, purpura, petechia  Allergic/Immunologic: Denies swelling of throat, pain or swelling at lymph nodes      Physical Examination:    Visit Vitals  BP (!) 150/76 (BP 1 Location: Right arm, BP Patient Position: Sitting, BP Cuff Size: Large adult)   Pulse 67   Temp (!) 96.2 °F (35.7 °C) (Tympanic)   Ht 5' 6\" (1.676 m)   Wt 200 lb (90.7 kg)   SpO2 99%   BMI 32.28 kg/m²        General: AOX3, no apparent distress  Psychiatric: mood and affect appropriate  Lungs: breathing is symmetric and unlabored bilaterally  Heart: regular rate and rhythm  Abdomen: no guarding  Head: normocephalic, atraumatic  Skin: No significant abnormalities, good turgor  Sensation intact to light touch: L1-S1 dermatomes  Muscular exam: 5/5 strength in all major muscle groups unless noted in specialty exam.    Extremities:      Left upper extremity: Full active and passive range of motion without pain, deformity, no open wound, strength 5/5 in all major muscle groups. Right upper extremity: Full active and passive range of motion without pain, deformity, no open wound, strength 5/5 in all major muscle groups. Right lower extremity: No deformity is noted. Range of motion of the knee is 0-120. Ligamentous testing of the knee indicates stability of the the MCL, LCL, PCL, and ACL. Lachman's, anterior and posterior drawer tests are specifically negative. Lateral joint line tenderness to palpation is noted. Popliteal area is unremarkable. 1+  for effusion. + patellar crepitus. Patella tracks centrally. Pivot shift is negative.   Strength testing is indicative of 5/5 strength at hip flexion, extension, knee flexion and extension, tibialis anterior, EHL, and FHL. Sensation is intact to light touch in the L1-S1 dermatomes. Capillary refill is less than 2 seconds in the toes. Left lower extremity:  No deformity is noted. Range of motion of the knee is 0-120. Ligamentous testing of the knee indicates stability of the the MCL, LCL, PCL, and ACL. Lachman's, anterior and posterior drawer tests are specifically negative. Lateral joint line tenderness to palpation is noted. Popliteal area is unremarkable. 1+  for effusion. + patellar crepitus. Patella tracks centrally. Pivot shift is negative. Strength testing is indicative of 5/5 strength at hip flexion, extension, knee flexion and extension, tibialis anterior, EHL, and FHL. Sensation is intact to light touch in the L1-S1 dermatomes. Capillary refill is less than 2 seconds in the toes. Diagnostics:    Pertinent Diagnostics:  Xrays are available of the bilateral knee, they indicate moderate osteoarthritis of the knee joints, no significant other findings, no other osseus abnormalities, fractures, or dislocations. Ms. Renetta Chen has a reminder for a \"due or due soon\" health maintenance. I have asked that she contact her primary care provider for follow-up on this health maintenance. Date of Procedure: 3/9/2022  PROCEDURE NOTE - Bilateral knee injection of Celestone    Consent was obtained from the patient. The correct site was identified after confirmation with the patient. Following identification and confirmation of the correct site with the patient, the superolateral right knee was prepped in the normal sterile fashion. A local anesthetic of 1% lidocaine without epinephrine was then administered to the local tissues. Following, an injection of a mixture of  6 mg Celestone and 1% lidocaine without epinephrine was administered to the right knee.   The needle was then withdrawn and the injection site dressed with a sterile bandage at the conclusion. The procedure was well tolerated by the patient. No immediate adverse reactions were noted. Attention was then turned to the left knee. Following identification and confirmation of the correct site with the patient, the superolateral left knee was prepped in the normal sterile fashion. A local anesthetic of 1% lidocaine without epinephrine was then administered to the local tissues. Following, an injection of a mixture of  6 mg Celestone and 1% lidocaine without epinephrine was administered to the left knee. The needle was then withdrawn and the injection site dressed with a sterile bandage at the conclusion. The procedure was well tolerated by the patient. No immediate adverse reactions were noted.   Post injection instructions were given

## 2022-03-18 ENCOUNTER — VIRTUAL VISIT (OUTPATIENT)
Dept: ORTHOPEDIC SURGERY | Age: 72
End: 2022-03-18
Payer: MEDICARE

## 2022-03-18 DIAGNOSIS — M17.0 BILATERAL PRIMARY OSTEOARTHRITIS OF KNEE: Primary | ICD-10-CM

## 2022-03-18 PROCEDURE — 99441 PR PHYS/QHP TELEPHONE EVALUATION 5-10 MIN: CPT | Performed by: ORTHOPAEDIC SURGERY

## 2022-03-18 NOTE — PROGRESS NOTES
3/18/2022      CC: bilateral knee injections    HPI:      This is a 73y.o. year old female who presents for follow up of their bilateral bilateral knee injection. The patient states that they have had very good relief of their pain. The time since injection has been one week. PMH:  Past Medical History:   Diagnosis Date    Arthritis     narrowing of spine    Environmental and seasonal allergies     GERD - Esophagitis - reflux 3/22/2011    Headache(784.0) 6/27/2011    Hypercholesterolemia 3/22/2011    Hypertension 3/22/2011    Other ill-defined conditions(799.89)     high cholesterol, vertigo, migraine    Ulcer        PSxHx:  Past Surgical History:   Procedure Laterality Date    COLONOSCOPY N/A 2/28/2018    COLONOSCOPY performed by Nisha Aguila MD at [facility] ENDOSCOPY    HX BREAST BIOPSY Left 1980s    benign cyst    HX GI      hemmorhoidectomy    HX HYSTERECTOMY      HX OTHER SURGICAL      \"hemrrhoid surgery\"    MA BREAST SURGERY PROCEDURE UNLISTED         Meds:    Current Outpatient Medications:     hydroCHLOROthiazide (HYDRODIURIL) 25 mg tablet, TAKE 1 TABLET BY MOUTH EVERY DAY, Disp: 90 Tablet, Rfl: 0    simvastatin (ZOCOR) 40 mg tablet, Take 1 Tablet by mouth daily. , Disp: 90 Tablet, Rfl: 1    losartan (COZAAR) 100 mg tablet, Take 1 Tablet by mouth daily. , Disp: 90 Tablet, Rfl: 1    pantoprazole (PROTONIX) 40 mg tablet, TAKE 1 TABLET BY MOUTH ONCE DAILY. , Disp: 180 Tablet, Rfl: 1    acetaminophen (Tylenol Arthritis Pain) 650 mg TbER, Take 650 mg by mouth every eight (8) hours. , Disp: , Rfl:     estradioL (ESTRACE) 0.5 mg tablet, Take 0.5 mg by mouth daily. (Patient not taking: Reported on 12/8/2021), Disp: , Rfl:     timolol (TIMOPTIC) 0.5 % ophthalmic solution, Administer 1 Drop to both eyes two (2) times a day., Disp: , Rfl:     albuterol (PROAIR HFA) 90 mcg/actuation inhaler, Take 1 Puff by inhalation every four (4) hours as needed for Wheezing or Shortness of Breath.  (Patient not taking: Reported on 12/8/2021), Disp: 1 Inhaler, Rfl: 1    fluticasone (FLONASE ALLERGY RELIEF) 50 mcg/actuation nasal spray, 2 Sprays by Both Nostrils route daily. (Patient not taking: Reported on 12/8/2021), Disp: , Rfl:     polyethylene glycol (MIRALAX) 17 gram packet, Take 17 g by mouth daily. , Disp: , Rfl:     All:  No Known Allergies    Social Hx:  Social History     Socioeconomic History    Marital status:    Tobacco Use    Smoking status: Never Smoker    Smokeless tobacco: Never Used   Substance and Sexual Activity    Alcohol use: No    Drug use: No       Family Hx:  Family History   Problem Relation Age of Onset    Hypertension Mother     Hypertension Father          Review of Systems:       General: Denies headache, lethargy, fever, weight loss  Ears/Nose/Throat: Denies ear discharge, drainage, nosebleeds, hoarse voice, dental problems  Cardiovascular: Denies chest pain, shortness of breath  Lungs: Denies chest pain, breathing problems, wheezing, pneumonia  Stomach: Denies stomach pain, heartburn, constipation, irritable bowel  Skin: Denies rash, sores, open wounds  Musculoskeletal: bilateral knee pain - resolved  Genitourinary: Denies dysuria, hematuria, polyuria  Gastrointestinal: Denies constipation, obstipation, diarrhea  Neurological: Denies changes in sight, smell, hearing, taste, seizures. Denies loss of consciousness. Psychiatric: Denies depression, sleep pattern changes, anxiety, change in personality  Endocrine: Denies mood swings, heat or cold intolerance  Hematologic/Lymphatic: Denies anemia, purpura, petechia  Allergic/Immunologic: Denies swelling of throat, pain or swelling at lymph nodes      Physical Examination:    There were no vitals taken for this visit. General: AOX3, no apparent distress  Psychiatric: mood and affect appropriate        Diagnostics:    Pertinent Diagnostics: none    Assessment: Status post bilateral knee injection  Plan:     This patient and I discussed the normal course for injections, we discussed that pain relief will likely be temporary to some degree, but I cannot predict the longevity of relief. We also discussed the limitations of injections, and that I cannot inject the same area any more often than every three months. We will proceed with continued observation, follow up prn. Patient was in [place] at the time of consultation. I was in the office while conducting this encounter. Consent:  She and/or her healthcare decision maker is aware that this patient-initiated Telehealth encounter is a billable service, with coverage as determined by her insurance carrier. She is aware that she may receive a bill and has provided verbal consent to proceed: Yes    This virtual visit was conducted telephone encounter only. -  I affirm this is a Patient Initiated Episode with an Established Patient who has not had a related appointment within my department in the past 7 days or scheduled within the next 24 hours. Note: this encounter is not billable if this call serves to triage the patient into an appointment for the relevant concern. Total Time: minutes: 5-10 minutes. Ms. Avelina Mendoza has a reminder for a \"due or due soon\" health maintenance. I have asked that she contact her primary care provider for follow-up on this health maintenance.

## 2022-03-19 PROBLEM — I34.0 NONRHEUMATIC MITRAL VALVE REGURGITATION: Status: ACTIVE | Noted: 2021-07-01

## 2022-05-04 RX ORDER — HYDROCHLOROTHIAZIDE 25 MG/1
TABLET ORAL
Qty: 90 TABLET | Refills: 0 | Status: SHIPPED | OUTPATIENT
Start: 2022-05-04 | End: 2022-10-07

## 2022-05-18 ENCOUNTER — TRANSCRIBE ORDER (OUTPATIENT)
Dept: SCHEDULING | Age: 72
End: 2022-05-18

## 2022-05-18 DIAGNOSIS — R93.3 ABNORMAL FINDINGS ON DIAGNOSTIC IMAGING OF OTHER PARTS OF DIGESTIVE TRACT: Primary | ICD-10-CM

## 2022-05-18 DIAGNOSIS — R11.0 NAUSEA: ICD-10-CM

## 2022-05-18 DIAGNOSIS — K59.00 CONSTIPATION: ICD-10-CM

## 2022-05-18 DIAGNOSIS — R19.4 CHANGE IN BOWEL HABITS: ICD-10-CM

## 2022-06-01 NOTE — PROGRESS NOTES
HISTORY OF PRESENT ILLNESS  Jacky Manzano is a 73 y.o. female. HPI     Last here 12/08/21. Pt is here for routine care.     Has a history of hypertension  BP today is 134/82  Pt is on losartan 100 mg and hctz 25 mg now separate    Recall pt had a cough on lisinopril. She was taking this medication for 20 years.     Wt today is 193 lbs, up 10 lbs x lov   Discussed diet and wt/l    Reviewed labs   Will get labs today    Has CT abd pelv scheduled 6/13/22     She c/o knee pain lov, she went to PT for this  This helped somewhat, but she still has feeling of her knee going out sometimes  Reviewed xr R knee 6/15/21:   No acute abnormality  She is now following with Dr. Yon Cronin (ortho)  Reviewed note 3/18/22: This patient and I discussed the normal course for injections, we discussed that pain relief will likely be temporary to some degree, but I cannot predict the longevity of relief. We also discussed the limitations of injections, and that I cannot inject the same area any more often than every three months. We will proceed with continued observation, follow up prn. Patient was in [place] at the time of consultation.  Axel Car had injections in both knee this helped  Reviewed xr L knee 3/07/22:  Mild left knee osteoarthrosis. Reviewed xr R knee 3/07/22:  Mild right knee osteoarthrosis again shown. Pt follows with Dr. Lesley Castillo (GI)   Last visit was 5/22- she had been having a lot of nausea, will be getting CT abd next Monday  Pt is now taking protonix BID-this is helping a little  Dr. Lesley Castillo ordered another med for nausea but it was too expensive so she did not start this  Recall she previously followed with Dr. Lesley Castillo about her stomach and episodes of diarrhea. Her stomach pain has resolved, continues on prevacid for this. Previously took amitiza prn but no longer on this, had hemorrhoid surgery.  Doing well       Pt was referred to Dr. Dudley Nina (cardio) went there for mitral valve regurgitation  Reviewed note 21   Recall stress echo 21:  · Baseline ECG: Normal sinus rhythm. · Stress test: Negative stress test.  · Echo: Normal stress echocardiogram.     Has follow-up pending      She reports being told to take allegra instead of zyrtec since she was feeling tired  Discussed that zyrtec is sedating  Discussed getting zyrtec in bulk might be cheaper        Continues on zocor 40mg daily for cholesterol      Has xanax to use prn-has not used this at all in a long time     She was given oral estrogen from gyn for hot flashes, not longer taking this       Recall echo : 60 - 65%. Visually measured ejection fraction. Normal cavity size and wall thickness. · PA: Pulmonary arterial systolic pressure is 34 mmHg. · TV: Mild tricuspid valve regurgitation is present. MV: Mild to moderate mitral valve regurgitation is present. Regurgitation has increased since last echo, discussed f/u with cardio scheduled for August will be getting repeat echocardiogram then    Pt lives with her     Pt is functionally independent     Does not have any safety equipment      No memory concerns   Knows the month, date, year, location   Can recall 3/3 objects       ACP not on file. SDM is her  [place]).   Provided this info in the past     PREVENTIVE:  Colonoscopy:  with Dr. Bucky Jones, 10 yr f/u  EGD: 18 with Dr. Bucky Jones  AAA: [facility] AAA  Pap: Dr. Marion Dimas negative  DEXA: 21, mild osteopenia   Tdap:  due reminded  Pneumovax: 2017  Srnahtd94: 18   Shingles: she is now open to this discussed can get at pharmacy  Flu shot: declines    A1c: ,   6.2  6.0  5.9  5.8  Eye exam: Dr. Baugh,  has eye drops for glaucoma, scheduled   Hep C screen:  negative  Lipids:  LDL 74  EK/3/17, nsr with cardio  Covid: three doses (moderna)       Patient Active Problem List    Diagnosis Date Noted    Nonrheumatic mitral valve regurgitation 2021  Essential hypertension with goal blood pressure less than 130/85 08/03/2016    Pure hypercholesterolemia 08/03/2016    Osteopenia 03/23/2012    Constipation 08/05/2011    Glaucoma 08/05/2011    Vertigo 08/05/2011    Headache(784.0) 06/27/2011    Esophagitis 03/22/2011     Current Outpatient Medications   Medication Sig Dispense Refill    diph,pertuss,acel,,tetanus vac,PF, (ADACEL) 2 Lf-(2.5-5-3-5 mcg)-5Lf/0.5 mL syrg vaccine 0.5 mL by IntraMUSCular route once for 1 dose. 0.5 mL 0    varicella-zoster recombinant, PF, (Shingrix, PF,) 50 mcg/0.5 mL susr injection 0.5mL by IntraMUSCular route once now and then repeat in 2-6 months 0.5 mL 1    simvastatin (ZOCOR) 40 mg tablet TAKE 1 TABLET BY MOUTH DAILY 90 Tablet 1    hydroCHLOROthiazide (HYDRODIURIL) 25 mg tablet TAKE 1 TABLET BY MOUTH EVERY DAY 90 Tablet 0    losartan (COZAAR) 100 mg tablet Take 1 Tablet by mouth daily. 90 Tablet 1    pantoprazole (PROTONIX) 40 mg tablet TAKE 1 TABLET BY MOUTH ONCE DAILY. 180 Tablet 1    acetaminophen (Tylenol Arthritis Pain) 650 mg TbER Take 650 mg by mouth every eight (8) hours.  timolol (TIMOPTIC) 0.5 % ophthalmic solution Administer 1 Drop to both eyes two (2) times a day.  polyethylene glycol (MIRALAX) 17 gram packet Take 17 g by mouth daily. Past Surgical History:   Procedure Laterality Date    COLONOSCOPY N/A 2/28/2018    [facility] Brit [facility]COPY    HX BREAST BIOPSY Left 1980s    benign cyst    HX GI      hemmorhoidectomy    HX HYSTERECTOMY      HX OTHER SURGICAL      \"hemrrhoid surgery\"    CA BREAST SURGERY PROCEDURE UNLISTED        Lab Results   Component Value Date/Time    WBC 4.7 06/15/2021 09:58 AM    HGB 11.6 06/15/2021 09:58 AM    HCT 36.1 06/15/2021 09:58 AM    PLATELET 770 27/20/9858 09:58 AM    MCV 94.8 06/15/2021 09:58 AM     Lab Results   Component Value Date/Time    ALT (SGPT) 17 06/15/2021 09:58 AM    Alk.  phosphatase 72 06/15/2021 09:58 AM    Bilirubin, total 0.5 06/15/2021 09:58 AM    Albumin 4.5 06/15/2021 09:58 AM    Protein, total 7.3 06/15/2021 09:58 AM    PLATELET 970 00/52/8781 09:58 AM     Lab Results   Component Value Date/Time    GFR est non-AA >60 12/08/2021 09:41 AM    GFR est AA >60 12/08/2021 09:41 AM    Creatinine 0.87 12/08/2021 09:41 AM    BUN 16 12/08/2021 09:41 AM    Sodium 139 12/08/2021 09:41 AM    Potassium 4.7 12/08/2021 09:41 AM    Chloride 107 12/08/2021 09:41 AM    CO2 28 12/08/2021 09:41 AM    Magnesium 1.9 09/03/2014 10:55 AM        Review of Systems   Respiratory: Negative for shortness of breath. Cardiovascular: Negative for chest pain. Physical Exam  Constitutional:       General: She is not in acute distress. Appearance: Normal appearance. She is not ill-appearing, toxic-appearing or diaphoretic. HENT:      Head: Normocephalic and atraumatic. Right Ear: External ear normal.      Left Ear: External ear normal.   Eyes:      General:         Right eye: No discharge. Left eye: No discharge. Conjunctiva/sclera: Conjunctivae normal.      Pupils: Pupils are equal, round, and reactive to light. Cardiovascular:      Rate and Rhythm: Normal rate and regular rhythm. Heart sounds: No murmur heard. No friction rub. No gallop. Pulmonary:      Effort: No respiratory distress. Breath sounds: Normal breath sounds. No wheezing or rales. Chest:      Chest wall: No tenderness. Musculoskeletal:         General: Normal range of motion. Cervical back: Normal range of motion and neck supple. Skin:     General: Skin is warm and dry. Neurological:      Mental Status: She is alert and oriented to person, place, and time. Mental status is at baseline. Coordination: Coordination normal.      Gait: Gait normal.   Psychiatric:         Mood and Affect: Mood normal.         Behavior: Behavior normal.         ASSESSMENT and PLAN    ICD-10-CM ICD-9-CM    1.  Medicare annual wellness visit, subsequent  Z00.00 V70.0 LIPID PANEL      METABOLIC PANEL, COMPREHENSIVE      HEMOGLOBIN A1C WITH EAG      CBC W/O DIFF      TSH 3RD GENERATION   2. Essential hypertension with goal blood pressure less than 130/85  I10 401.9 LIPID PANEL      METABOLIC PANEL, COMPREHENSIVE   Adequately controlled on losartan and hydrochlorothiazide continue to monitor blood pressure at home check metabolic panel to ensure K and creatinine levels are stable   HEMOGLOBIN A1C WITH EAG      CBC W/O DIFF      TSH 3RD GENERATION   3. Pure hypercholesterolemia  E78.00 272.0 LIPID PANEL      METABOLIC PANEL, COMPREHENSIVE   Controlled on Zocor in the past check lipids and adjust dose as needed   HEMOGLOBIN A1C WITH EAG      CBC W/O DIFF      TSH 3RD GENERATION   4. Osteopenia of multiple sites  M85.89 733.90 LIPID PANEL      METABOLIC PANEL, COMPREHENSIVE   Vitamin D for treatment will be due for repeat bone density in January   HEMOGLOBIN A1C WITH EAG      CBC W/O DIFF      TSH 3RD GENERATION   5. Nonrheumatic mitral valve regurgitation  I34.0 424.0 LIPID PANEL      METABOLIC PANEL, COMPREHENSIVE   Will be seeing cardiology later this summer and repeating echocardiogram at that time   HEMOGLOBIN A1C WITH EAG      CBC W/O DIFF      TSH 3RD GENERATION   6. Drug-induced constipation  K59.03 564.09 LIPID PANEL     Z040.9 METABOLIC PANEL, COMPREHENSIVE   Improved with MiraLAX as needed   HEMOGLOBIN A1C WITH EAG      CBC W/O DIFF      TSH 3RD GENERATION   7. Esophagitis  K20.90 530.10 LIPID PANEL      METABOLIC PANEL, COMPREHENSIVE   Following with gastroenterology she is not taking Protonix twice daily has a CT of the abdomen pending   HEMOGLOBIN A1C WITH EAG      CBC W/O DIFF      TSH 3RD GENERATION   8.  IFG (impaired fasting glucose)  R73.01 790.21 LIPID PANEL      METABOLIC PANEL, COMPREHENSIVE      HEMOGLOBIN A1C WITH EAG   Diet controlled monitor A1c   CBC W/O DIFF      TSH 3RD GENERATION        Scribed by Kwesi Birmingham of [address], as dictated by Dr. Chetna Poole. Current diagnosis and concerns discussed with pt at length. Pt understands risks and benefits or current treatment plan and medications, and accepts the treatment and medication with any possible risks. Pt asks appropriate questions, which were answered. Pt was instructed to call with any concerns or problems. I have reviewed the note documented by the scribe. The services provided are my own.   The documentation is accurate

## 2022-06-03 RX ORDER — SIMVASTATIN 40 MG/1
40 TABLET, FILM COATED ORAL DAILY
Qty: 90 TABLET | Refills: 1 | Status: SHIPPED | OUTPATIENT
Start: 2022-06-03 | End: 2022-07-06

## 2022-06-06 ENCOUNTER — OFFICE VISIT (OUTPATIENT)
Dept: INTERNAL MEDICINE CLINIC | Age: 72
End: 2022-06-06
Payer: MEDICARE

## 2022-06-06 VITALS
HEART RATE: 66 BPM | BODY MASS INDEX: 31.08 KG/M2 | WEIGHT: 193.4 LBS | HEIGHT: 66 IN | OXYGEN SATURATION: 98 % | SYSTOLIC BLOOD PRESSURE: 134 MMHG | RESPIRATION RATE: 16 BRPM | DIASTOLIC BLOOD PRESSURE: 82 MMHG | TEMPERATURE: 97.4 F

## 2022-06-06 DIAGNOSIS — R73.01 IFG (IMPAIRED FASTING GLUCOSE): ICD-10-CM

## 2022-06-06 DIAGNOSIS — K59.03 DRUG-INDUCED CONSTIPATION: ICD-10-CM

## 2022-06-06 DIAGNOSIS — I34.0 NONRHEUMATIC MITRAL VALVE REGURGITATION: ICD-10-CM

## 2022-06-06 DIAGNOSIS — Z00.00 MEDICARE ANNUAL WELLNESS VISIT, SUBSEQUENT: Primary | ICD-10-CM

## 2022-06-06 DIAGNOSIS — K20.90 ESOPHAGITIS: ICD-10-CM

## 2022-06-06 DIAGNOSIS — I10 ESSENTIAL HYPERTENSION WITH GOAL BLOOD PRESSURE LESS THAN 130/85: ICD-10-CM

## 2022-06-06 DIAGNOSIS — E78.00 PURE HYPERCHOLESTEROLEMIA: ICD-10-CM

## 2022-06-06 DIAGNOSIS — M85.89 OSTEOPENIA OF MULTIPLE SITES: ICD-10-CM

## 2022-06-06 PROCEDURE — G8427 DOCREV CUR MEDS BY ELIG CLIN: HCPCS | Performed by: INTERNAL MEDICINE

## 2022-06-06 PROCEDURE — 1101F PT FALLS ASSESS-DOCD LE1/YR: CPT | Performed by: INTERNAL MEDICINE

## 2022-06-06 PROCEDURE — G8399 PT W/DXA RESULTS DOCUMENT: HCPCS | Performed by: INTERNAL MEDICINE

## 2022-06-06 PROCEDURE — G8752 SYS BP LESS 140: HCPCS | Performed by: INTERNAL MEDICINE

## 2022-06-06 PROCEDURE — G0439 PPPS, SUBSEQ VISIT: HCPCS | Performed by: INTERNAL MEDICINE

## 2022-06-06 PROCEDURE — G8510 SCR DEP NEG, NO PLAN REQD: HCPCS | Performed by: INTERNAL MEDICINE

## 2022-06-06 PROCEDURE — G8754 DIAS BP LESS 90: HCPCS | Performed by: INTERNAL MEDICINE

## 2022-06-06 PROCEDURE — G8536 NO DOC ELDER MAL SCRN: HCPCS | Performed by: INTERNAL MEDICINE

## 2022-06-06 PROCEDURE — 3017F COLORECTAL CA SCREEN DOC REV: CPT | Performed by: INTERNAL MEDICINE

## 2022-06-06 PROCEDURE — G9899 SCRN MAM PERF RSLTS DOC: HCPCS | Performed by: INTERNAL MEDICINE

## 2022-06-06 PROCEDURE — G8417 CALC BMI ABV UP PARAM F/U: HCPCS | Performed by: INTERNAL MEDICINE

## 2022-06-06 PROCEDURE — 1090F PRES/ABSN URINE INCON ASSESS: CPT | Performed by: INTERNAL MEDICINE

## 2022-06-06 PROCEDURE — 99214 OFFICE O/P EST MOD 30 MIN: CPT | Performed by: INTERNAL MEDICINE

## 2022-06-06 RX ORDER — ZOSTER VACCINE RECOMBINANT, ADJUVANTED 50 MCG/0.5
KIT INTRAMUSCULAR
Qty: 0.5 ML | Refills: 1 | Status: SHIPPED | OUTPATIENT
Start: 2022-06-06

## 2022-06-06 NOTE — PROGRESS NOTES
This is the Subsequent Medicare Annual Wellness Exam, performed 12 months or more after the Initial AWV or the last Subsequent AWV    I have reviewed the patient's medical history in detail and updated the computerized patient record. Assessment/Plan   Education and counseling provided:  Are appropriate based on today's review and evaluation    1. Medicare annual wellness visit, subsequent       Depression Risk Factor Screening     3 most recent PHQ Screens 6/6/2022   Little interest or pleasure in doing things Not at all   Feeling down, depressed, irritable, or hopeless Not at all   Total Score PHQ 2 0       Alcohol & Drug Abuse Risk Screen    Do you average more than 1 drink per night or more than 7 drinks a week:  No    On any one occasion in the past three months have you have had more than 3 drinks containing alcohol:  No          Functional Ability and Level of Safety    Hearing: Hearing is good. Activities of Daily Living: The home contains: no safety equipment. Patient does total self care      Ambulation: with no difficulty     Fall Risk:  Fall Risk Assessment, last 12 mths 6/6/2022   Able to walk?  Yes   Fall in past 12 months? 0   Do you feel unsteady? -   Are you worried about falling -      Abuse Screen:  Patient is not abused   lives w      Cognitive Screening    Has your family/caregiver stated any concerns about your memory: no     Cognitive Screening: Normal - Mini Cog Test       No memory concerns   Pt knows the month, day and year   Can recall 3/3 objects     Health Maintenance Due     Health Maintenance Due   Topic Date Due    Shingrix Vaccine Age 49> (1 of 2) Never done    COVID-19 Vaccine (3 - Booster for Lilian Blair series) 09/27/2021    DTaP/Tdap/Td series (2 - Td or Tdap) 01/01/2022    Pneumococcal 65+ years (2 - PPSV23 or PCV20) 02/06/2022    Medicare Yearly Exam  06/10/2022    Lipid Screen  06/15/2022       Patient Care Team   Patient Care Team:  Pamela Camarena, MD as PCP - General (Internal Medicine Physician)  Willis Chance MD as PCP - [address] [place] [address]  [facility] Provider  Juanito Pak MD as Physician (Cardiovascular Disease Physician)  Ryan Marrero MD (Gastroenterology)  Collette Gains, MD (Ophthalmology)  Tomas Valles MD (Obstetrics & Gynecology)  Seymour Sims MD (Otolaryngology)  Aguilar Loyola DO as Surgeon (Orthopedic Surgery)    History     Patient Active Problem List   Diagnosis Code    Esophagitis K20.90    Headache(784.0) R46    Constipation K59.00    Glaucoma H40.9    Vertigo R42    Osteopenia M85.80    Essential hypertension with goal blood pressure less than 130/85 I10    Pure hypercholesterolemia E78.00    Nonrheumatic mitral valve regurgitation I34.0     Past Medical History:   Diagnosis Date    Arthritis     narrowing of spine    Environmental and seasonal allergies     GERD - Esophagitis - reflux 3/22/2011    Headache(784.0) 6/27/2011    Hypercholesterolemia 3/22/2011    Hypertension 3/22/2011    Other ill-defined conditions(799.89)     high cholesterol, vertigo, migraine    Ulcer       Past Surgical History:   Procedure Laterality Date    COLONOSCOPY N/A 2/28/2018    COL[facility]OSCOPY performed by [facility] Alberto Dickens [facility] at [facility] ENDOSCOPY    HX BREAST BIOPSY Left 1980s    benign cyst    HX GI      hemmorhoidectomy    HX HYSTERECTOMY      HX OTHER SURGICAL      \"hemrrhoid surgery\"    [place] BREAST SURGERY PROCEDURE UNLISTED       Current Outpatient Medications   Medication Sig Dispense Refill    simvastatin (ZOCOR) 40 mg tablet TAKE 1 TABLET BY MOUTH DAILY 90 Tablet 1    hydroCHLOROthiazide (HYDRODIURIL) 25 mg tablet TAKE 1 TABLET BY MOUTH EVERY DAY 90 Tablet 0    losartan (COZAAR) 100 mg tablet Take 1 Tablet by mouth daily. 90 Tablet 1    pantoprazole (PROTONIX) 40 mg tablet TAKE 1 TABLET BY MOUTH ONCE DAILY. 180 Tablet 1    acetaminophen (Tylenol Arthritis Pain) 650 mg TbER Take 650 mg by mouth every eight (8) hours.       estradioL (ESTRACE) 0.5 mg tablet Take 0.5 mg by mouth daily. (Patient not taking: Reported on 2021)      timolol (TIMOPTIC) 0.5 % ophthalmic solution Administer 1 Drop to both eyes two (2) times a day.  albuterol (PROAIR HFA) 90 mcg/actuation inhaler Take 1 Puff by inhalation every four (4) hours as needed for Wheezing or Shortness of Breath. (Patient not taking: Reported on 2021) 1 Inhaler 1    fluticasone (FLONASE ALLERGY RELIEF) 50 mcg/actuation nasal spray 2 Sprays by Both Nostrils route daily. (Patient not taking: Reported on 2021)      polyethylene glycol (MIRALAX) 17 gram packet Take 17 g by mouth daily. No Known Allergies    Family History   Problem Relation Age of Onset    Hypertension Mother     Hypertension Father      Social History     Tobacco Use    Smoking status: Never Smoker    Smokeless tobacco: Never Used   Substance Use Topics    Alcohol use: No     ACP not on file. SDM is her  Horace Baumgarten). Provided this info in the past       Colonoscopy:  with Dr. Krunal Johns, 10 yr f/u  EGD: 18 with Dr. Krunal Johns, discussed f/u for repeat which is scheduled for 2022  AAA: [facility] AAA  Pap: Dr. Kitty Villa negative annual  Gwendalyn Beets every 2 years    Tdap: --due  Pneumovax: 2017  Rexwtud75: 18   Shingles: gave script   Flu shot: declines      Eye exam: Dr. Tracy Stanley,  has eye drops for glaucoma, scheduled     A1c: , 5.8 due   Hep C screen:  negative  Lipids:  LDL 74 due  EK/3/17, nsr with cardio  Covid: both (moderna) and booster    Medication reconciliation completed by MA and reviewed by me. Medical/surgical/social/family history reviewed and updated by me. Patient provided AVS and preventative screening table.   Patient verbalized understanding of all information discussed.         Radha Ardon MD [employer]

## 2022-06-06 NOTE — PATIENT INSTRUCTIONS
Medicare Wellness Visit, Female     The best way to live healthy is to have a lifestyle where you eat a well-balanced diet, exercise regularly, limit alcohol use, and quit all forms of tobacco/nicotine, if applicable. Regular preventive services are another way to keep healthy. Preventive services (vaccines, screening tests, monitoring & exams) can help personalize your care plan, which helps you manage your own care. Screening tests can find health problems at the earliest stages, when they are easiest to treat. [facility] follows the current, evidence-based guidelines published by the [facility] Rui Rios ([facility]) when recommending preventive services for our patients. Because we follow these guidelines, sometimes recommendations change over time as research supports it. (For example, mammograms used to be recommended annually. Even though Medicare will still pay for an annual mammogram, the newer guidelines recommend a mammogram every two years for women of average risk). Of course, you and your doctor may decide to screen more often for some diseases, based on your risk and your co-morbidities (chronic disease you are already diagnosed with). Preventive services for you include:  - Medicare offers their members a free annual wellness visit, which is time for you and your primary care provider to discuss and plan for your preventive service needs. Take advantage of this benefit every year!  -All adults over the age of 72 should receive the recommended pneumonia vaccines. Current USPSTF guidelines recommend a series of two vaccines for the best pneumonia protection.   -All adults should have a flu vaccine yearly and a tetanus vaccine every 10 years.   -All adults age 48 and older should receive the shingles vaccines (series of two vaccines).       -All adults age 38-68 who are overweight should have a diabetes screening test once every three years.   -All adults born between 80 and 1965 should be screened once for Hepatitis C.  -Other screening tests and preventive services for persons with diabetes include: an eye exam to screen for diabetic retinopathy, a kidney function test, a foot exam, and stricter control over your cholesterol.   -Cardiovascular screening for adults with routine risk involves an electrocardiogram (ECG) at intervals determined by your doctor.   -Colorectal cancer screenings should be done for adults age 54-65 with no increased risk factors for colorectal cancer. There are a number of acceptable methods of screening for this type of cancer. Each test has its own benefits and drawbacks. Discuss with your doctor what is most appropriate for you during your annual wellness visit. The different tests include: colonoscopy (considered the best screening method), a fecal occult blood test, a fecal DNA test, and sigmoidoscopy.    -A bone mass density test is recommended when a woman turns 65 to screen for osteoporosis. This test is only recommended one time, as a screening. Some providers will use this same test as a disease monitoring tool if you already have osteoporosis. -Breast cancer screenings are recommended every other year for women of normal risk, age 54-69.  -Cervical cancer screenings for women over age 72 are only recommended with certain risk factors.      Here is a list of your current Health Maintenance items (your personalized list of preventive services) with a due date:  Health Maintenance Due   Topic Date Due    Shingles Vaccine (1 of 2) Never done    COVID-19 Vaccine (3 - Booster for Moderna series) 09/27/2021    DTaP/Tdap/Td  (2 - Td or Tdap) 01/01/2022    Pneumococcal Vaccine (2 - PPSV23 or PCV20) 02/06/2022    Annual Well Visit  06/10/2022    Cholesterol Test   06/15/2022         Medicare Wellness Visit, Female     The best way to live healthy is to have a lifestyle where you eat a well-balanced diet, exercise regularly, limit alcohol use, and quit all forms of tobacco/nicotine, if applicable. Regular preventive services are another way to keep healthy. Preventive services (vaccines, screening tests, monitoring & exams) can help personalize your care plan, which helps you manage your own care. Screening tests can find health problems at the earliest stages, when they are easiest to treat. [facility] follows the current, evidence-based guidelines published by the [facility] Rui Gabriel ([facility]) when recommending preventive services for our patients. Because we follow these guidelines, sometimes recommendations change over time as research supports it. (For example, mammograms used to be recommended annually. Even though Medicare will still pay for an annual mammogram, the newer guidelines recommend a mammogram every two years for women of average risk). Of course, you and your doctor may decide to screen more often for some diseases, based on your risk and your co-morbidities (chronic disease you are already diagnosed with). Preventive services for you include:  - Medicare offers their members a free annual wellness visit, which is time for you and your primary care provider to discuss and plan for your preventive service needs. Take advantage of this benefit every year!  -All adults over the age of 72 should receive the recommended pneumonia vaccines. Current USPSTF guidelines recommend a series of two vaccines for the best pneumonia protection.   -All adults should have a flu vaccine yearly and a tetanus vaccine every 10 years.   -All adults age 48 and older should receive the shingles vaccines (series of two vaccines).       -All adults age 38-68 who are overweight should have a diabetes screening test once every three years.   -All adults born between 80 and 1965 should be screened once for Hepatitis C.  -Other screening tests and preventive services for persons with diabetes include: an eye exam to screen for diabetic retinopathy, a kidney function test, a foot exam, and stricter control over your cholesterol.   -Cardiovascular screening for adults with routine risk involves an electrocardiogram (ECG) at intervals determined by your doctor.   -Colorectal cancer screenings should be done for adults age 54-65 with no increased risk factors for colorectal cancer. There are a number of acceptable methods of screening for this type of cancer. Each test has its own benefits and drawbacks. Discuss with your doctor what is most appropriate for you during your annual wellness visit. The different tests include: colonoscopy (considered the best screening method), a fecal occult blood test, a fecal DNA test, and sigmoidoscopy.    -A bone mass density test is recommended when a woman turns 65 to screen for osteoporosis. This test is only recommended one time, as a screening. Some providers will use this same test as a disease monitoring tool if you already have osteoporosis. -Breast cancer screenings are recommended every other year for women of normal risk, age 54-69.  -Cervical cancer screenings for women over age 72 are only recommended with certain risk factors.      Here is a list of your current Health Maintenance items (your personalized list of preventive services) with a due date:  Health Maintenance Due   Topic Date Due    Shingles Vaccine (1 of 2) Never done    DTaP/Tdap/Td  (2 - Td or Tdap) 01/01/2022    Pneumococcal Vaccine (2 - PPSV23 or PCV20) 02/06/2022    Annual Well Visit  06/10/2022    Cholesterol Test   06/15/2022

## 2022-06-06 NOTE — PROGRESS NOTES
1. \"Have you been to the ER, urgent care clinic since your last visit? Hospitalized since your last visit? \" No    2. \"Have you seen or consulted any other health care providers outside of the [address] [place] [address] since your last visit? \" No     3. For patients aged 39-70: Has the patient had a colonoscopy / FIT/ Cologuard? Yes - no Care Gap present      If the patient is female:    4. For patients aged 41-77: Has the patient had a mammogram within the past 2 years? Yes - no Care Gap present      5. For patients aged 21-65: Has the patient had a pap smear?  NA - based on age or sex

## 2022-06-18 LAB
ALBUMIN SERPL-MCNC: 4.8 G/DL (ref 3.8–4.8)
ALBUMIN/GLOB SERPL: 2.2 {RATIO} (ref 1.2–2.2)
ALP SERPL-CCNC: 74 IU/L (ref 44–121)
ALT SERPL-CCNC: 10 IU/L (ref 0–32)
AST SERPL-CCNC: 12 IU/L (ref 0–40)
BILIRUB SERPL-MCNC: 0.3 MG/DL (ref 0–1.2)
BUN SERPL-MCNC: 12 MG/DL (ref 8–27)
BUN/CREAT SERPL: 14 (ref 12–28)
CALCIUM SERPL-MCNC: 10.3 MG/DL (ref 8.7–10.3)
CHLORIDE SERPL-SCNC: 101 MMOL/L (ref 96–106)
CHOLEST SERPL-MCNC: 151 MG/DL (ref 100–199)
CO2 SERPL-SCNC: 26 MMOL/L (ref 20–29)
CREAT SERPL-MCNC: 0.87 MG/DL (ref 0.57–1)
EGFR: 72 ML/MIN/1.73
ERYTHROCYTE [DISTWIDTH] IN BLOOD BY AUTOMATED COUNT: 12.7 % (ref 11.7–15.4)
EST. AVERAGE GLUCOSE BLD GHB EST-MCNC: 131 MG/DL
GLOBULIN SER CALC-MCNC: 2.2 G/DL (ref 1.5–4.5)
GLUCOSE SERPL-MCNC: 91 MG/DL (ref 65–99)
HBA1C MFR BLD: 6.2 % (ref 4.8–5.6)
HCT VFR BLD AUTO: 35.4 % (ref 34–46.6)
HDLC SERPL-MCNC: 60 MG/DL
HGB BLD-MCNC: 12.1 G/DL (ref 11.1–15.9)
LDLC SERPL CALC-MCNC: 76 MG/DL (ref 0–99)
MCH RBC QN AUTO: 31.3 PG (ref 26.6–33)
MCHC RBC AUTO-ENTMCNC: 34.2 G/DL (ref 31.5–35.7)
MCV RBC AUTO: 92 FL (ref 79–97)
PLATELET # BLD AUTO: 296 X10E3/UL (ref 150–450)
POTASSIUM SERPL-SCNC: 4.7 MMOL/L (ref 3.5–5.2)
PROT SERPL-MCNC: 7 G/DL (ref 6–8.5)
RBC # BLD AUTO: 3.86 X10E6/UL (ref 3.77–5.28)
SODIUM SERPL-SCNC: 143 MMOL/L (ref 134–144)
TRIGL SERPL-MCNC: 78 MG/DL (ref 0–149)
TSH SERPL DL<=0.005 MIU/L-ACNC: 2.12 UIU/ML (ref 0.45–4.5)
VLDLC SERPL CALC-MCNC: 15 MG/DL (ref 5–40)
WBC # BLD AUTO: 4.9 X10E3/UL (ref 3.4–10.8)

## 2022-06-19 RX ORDER — LOSARTAN POTASSIUM 100 MG/1
100 TABLET ORAL DAILY
Qty: 90 TABLET | Refills: 1 | Status: SHIPPED | OUTPATIENT
Start: 2022-06-19

## 2022-07-01 ENCOUNTER — HOSPITAL ENCOUNTER (OUTPATIENT)
Dept: CT IMAGING | Age: 72
Discharge: HOME | End: 2022-07-01
Attending: INTERNAL MEDICINE
Payer: MEDICARE

## 2022-07-01 DIAGNOSIS — K59.00 CONSTIPATION: ICD-10-CM

## 2022-07-01 DIAGNOSIS — R93.3 ABNORMAL FINDINGS ON DIAGNOSTIC IMAGING OF OTHER PARTS OF DIGESTIVE TRACT: ICD-10-CM

## 2022-07-01 DIAGNOSIS — R19.4 CHANGE IN BOWEL HABITS: ICD-10-CM

## 2022-07-01 DIAGNOSIS — R11.0 NAUSEA: ICD-10-CM

## 2022-07-01 PROCEDURE — 74011000250 HC RX REV CODE- 250: Performed by: INTERNAL MEDICINE

## 2022-07-01 PROCEDURE — 74177 CT ABD & PELVIS W/CONTRAST: CPT

## 2022-07-01 PROCEDURE — 74011000636 HC RX REV CODE- 636: Performed by: INTERNAL MEDICINE

## 2022-07-01 RX ORDER — BARIUM SULFATE 20 MG/ML
900 SUSPENSION ORAL
Status: COMPLETED | OUTPATIENT
Start: 2022-07-01 | End: 2022-07-01

## 2022-07-01 RX ADMIN — IOPAMIDOL 100 ML: 755 INJECTION, SOLUTION INTRAVENOUS at 08:12

## 2022-07-01 RX ADMIN — BARIUM SULFATE 900 ML: 21 SUSPENSION ORAL at 08:12

## 2022-07-06 RX ORDER — SIMVASTATIN 40 MG/1
40 TABLET, FILM COATED ORAL DAILY
Qty: 90 TABLET | Refills: 1 | Status: SHIPPED | OUTPATIENT
Start: 2022-07-06

## 2022-08-22 ENCOUNTER — ANCILLARY PROCEDURE (OUTPATIENT)
Dept: CARDIOLOGY CLINIC | Age: 72
End: 2022-08-22
Payer: MEDICARE

## 2022-08-22 ENCOUNTER — OFFICE VISIT (OUTPATIENT)
Dept: CARDIOLOGY CLINIC | Age: 72
End: 2022-08-22

## 2022-08-22 VITALS
HEART RATE: 63 BPM | WEIGHT: 193 LBS | SYSTOLIC BLOOD PRESSURE: 132 MMHG | RESPIRATION RATE: 14 BRPM | HEIGHT: 66 IN | OXYGEN SATURATION: 98 % | BODY MASS INDEX: 31.02 KG/M2 | DIASTOLIC BLOOD PRESSURE: 84 MMHG

## 2022-08-22 VITALS — WEIGHT: 193 LBS | HEIGHT: 66 IN | BODY MASS INDEX: 31.02 KG/M2

## 2022-08-22 DIAGNOSIS — R01.1 CARDIAC MURMUR: ICD-10-CM

## 2022-08-22 DIAGNOSIS — I34.0 NONRHEUMATIC MITRAL VALVE REGURGITATION: Primary | ICD-10-CM

## 2022-08-22 DIAGNOSIS — I34.0 MITRAL VALVE INSUFFICIENCY, UNSPECIFIED ETIOLOGY: ICD-10-CM

## 2022-08-22 DIAGNOSIS — I10 ESSENTIAL HYPERTENSION WITH GOAL BLOOD PRESSURE LESS THAN 130/85: ICD-10-CM

## 2022-08-22 DIAGNOSIS — E78.00 PURE HYPERCHOLESTEROLEMIA: ICD-10-CM

## 2022-08-22 PROCEDURE — G8399 PT W/DXA RESULTS DOCUMENT: HCPCS | Performed by: INTERNAL MEDICINE

## 2022-08-22 PROCEDURE — 93000 ELECTROCARDIOGRAM COMPLETE: CPT | Performed by: INTERNAL MEDICINE

## 2022-08-22 PROCEDURE — G8510 SCR DEP NEG, NO PLAN REQD: HCPCS | Performed by: INTERNAL MEDICINE

## 2022-08-22 PROCEDURE — G9899 SCRN MAM PERF RSLTS DOC: HCPCS | Performed by: INTERNAL MEDICINE

## 2022-08-22 PROCEDURE — G8754 DIAS BP LESS 90: HCPCS | Performed by: INTERNAL MEDICINE

## 2022-08-22 PROCEDURE — G8417 CALC BMI ABV UP PARAM F/U: HCPCS | Performed by: INTERNAL MEDICINE

## 2022-08-22 PROCEDURE — G8536 NO DOC ELDER MAL SCRN: HCPCS | Performed by: INTERNAL MEDICINE

## 2022-08-22 PROCEDURE — 1101F PT FALLS ASSESS-DOCD LE1/YR: CPT | Performed by: INTERNAL MEDICINE

## 2022-08-22 PROCEDURE — 1123F ACP DISCUSS/DSCN MKR DOCD: CPT | Performed by: INTERNAL MEDICINE

## 2022-08-22 PROCEDURE — G8427 DOCREV CUR MEDS BY ELIG CLIN: HCPCS | Performed by: INTERNAL MEDICINE

## 2022-08-22 PROCEDURE — G8752 SYS BP LESS 140: HCPCS | Performed by: INTERNAL MEDICINE

## 2022-08-22 PROCEDURE — 1090F PRES/ABSN URINE INCON ASSESS: CPT | Performed by: INTERNAL MEDICINE

## 2022-08-22 PROCEDURE — 3017F COLORECTAL CA SCREEN DOC REV: CPT | Performed by: INTERNAL MEDICINE

## 2022-08-22 PROCEDURE — 93306 TTE W/DOPPLER COMPLETE: CPT | Performed by: INTERNAL MEDICINE

## 2022-08-22 PROCEDURE — 99214 OFFICE O/P EST MOD 30 MIN: CPT | Performed by: INTERNAL MEDICINE

## 2022-08-22 RX ORDER — MELATONIN
1000 DAILY
COMMUNITY

## 2022-08-22 NOTE — LETTER
8/22/2022    Patient: López Escalante   YOB: 1950   Date of Visit: 8/22/2022     Parish Brannon MD  [address]  [address] [facility]  [address]  [facility]  [address] [facility] [address]    Dear Parish Brannon MD,      Thank you for referring Ms. Cesar Munroe to [facility] for evaluation. My notes for this consultation are attached. If you have questions, please do not hesitate to call me. I look forward to following your patient along with you.       Sincerely,    Junior Pratt MD

## 2022-08-22 NOTE — PROGRESS NOTES
[place] [address], [place], [address] [place] [address]  [phone]     Subjective:      Jem Llanes is a 73 y.o. female is here for routine f/u. She has a PMHx of mitral regurgitation, HTN, HLD and GERD. Last seen by us in 7/2021:    Here for evaluation of mitral regurgitation. Had echo done for cardiac murmur, found to have mild-mod MR, previously mild. Also had been having complaints of fatigue, however these have resolved. She believes symptoms were due to allergy medication, as fatigue resolved once she stopped her Zyrtec. Today,   Prelim echo showed normal LVEF, only mild mitral regurgitation    The patient denies chest pain/ shortness of breath, orthopnea, PND, LE edema, palpitations, syncope, or presyncope. She has been walking regularly, and recently walked as much is 4 and half miles.     Patient Active Problem List    Diagnosis Date Noted    Nonrheumatic mitral valve regurgitation 07/01/2021    Essential hypertension with goal blood pressure less than 130/85 08/03/2016    Pure hypercholesterolemia 08/03/2016    Osteopenia 03/23/2012    Constipation 08/05/2011    Glaucoma 08/05/2011    Vertigo 08/05/2011    Headache(784.0) 06/27/2011    Esophagitis 03/22/2011      Dimitrios Zapata MD  Past Medical History:   Diagnosis Date    Arthritis     narrowing of spine    Environmental and seasonal allergies     GERD - Esophagitis - reflux 3/22/2011    Headache(784.0) 6/27/2011    Hypercholesterolemia 3/22/2011    Hypertension 3/22/2011    Other ill-defined conditions(799.89)     high cholesterol, vertigo, migraine    Ulcer       Past Surgical History:   Procedure Laterality Date    COLONOSCOPY N/A 2/28/2018    COLONOSCOPY performed by Fatimah Burnette MD at [facility] ENDOSCOPY    HX BREAST BIOPSY Left 1980s    benign cyst    HX GI      hemmorhoidectomy    HX HYSTERECTOMY      HX OTHER SURGICAL      \"hemrrhoid surgery\"    MA BREAST SURGERY PROCEDURE UNLISTED       No Known Allergies   Family History   Problem Relation Age of Onset    Hypertension Mother     Hypertension Father       Social History     Socioeconomic History    Marital status:      Spouse name: Not on file    Number of children: Not on file    Years of education: Not on file    Highest education level: Not on file   Occupational History    Not on file   Tobacco Use    Smoking status: Never    Smokeless tobacco: Never   Substance and Sexual Activity    Alcohol use: No    Drug use: No    Sexual activity: Not on file   Other Topics Concern    Not on file   Social History Narrative    Not on file     Social Determinants of Health     Financial Resource Strain: Not on file   Food Insecurity: Not on file   Transportation Needs: Not on file   Physical Activity: Not on file   Stress: Not on file   Social Connections: Not on file   Intimate Partner Violence: Not on file   Housing Stability: Not on file      Current Outpatient Medications   Medication Sig    cholecalciferol (Vitamin D3) (1000 Units /25 mcg) tablet Take 1,000 Units by mouth daily. simvastatin (ZOCOR) 40 mg tablet TAKE 1 TABLET BY MOUTH DAILY    losartan (COZAAR) 100 mg tablet TAKE 1 TABLET BY MOUTH DAILY    hydroCHLOROthiazide (HYDRODIURIL) 25 mg tablet TAKE 1 TABLET BY MOUTH EVERY DAY    pantoprazole (PROTONIX) 40 mg tablet TAKE 1 TABLET BY MOUTH ONCE DAILY. acetaminophen (TYLENOL) 650 mg TbER Take 650 mg by mouth every eight (8) hours. timolol (TIMOPTIC) 0.5 % ophthalmic solution Administer 1 Drop to both eyes two (2) times a day. polyethylene glycol (MIRALAX) 17 gram packet Take 17 g by mouth as needed. varicella-zoster recombinant, PF, (Shingrix, PF,) 50 mcg/0.5 mL susr injection 0.5mL by IntraMUSCular route once now and then repeat in 2-6 months (Patient not taking: Reported on 8/22/2022)     No current facility-administered medications for this visit.          Review of Symptoms:  11 systems reviewed, negative other than as stated in the HPI    Physical ExamPhysical Exam: Vitals:    08/22/22 1342 08/22/22 1410   BP: (!) 160/100 132/84   Pulse: 63    Resp: 14    SpO2: 98%    Weight: 193 lb (87.5 kg)    Height: 5' 6\" (1.676 m)      Body mass index is 31.15 kg/m². General PE  Gen:  NAD  Mental Status - Alert. General Appearance - Not in acute distress. HEENT:  PERRL, no carotid bruits or JVD  Chest and Lung Exam   Inspection: Accessory muscles - No use of accessory muscles in breathing. Auscultation:   Breath sounds: - Normal.   Cardiovascular   Inspection: Jugular vein - Bilateral - Inspection Normal.   Palpation/Percussion:   Apical Impulse: - Normal.   Auscultation: Rhythm - Regular. Heart Sounds - S1 WNL and S2 WNL. No S3 or S4. Murmurs & Other Heart Sounds: Auscultation of the heart reveals - No Murmurs. Peripheral Vascular   Upper Extremity: Inspection - Bilateral - No Cyanotic nailbeds or Digital clubbing. Lower Extremity:   Palpation: Edema - Bilateral - No edema. Abdomen:   Soft, non-tender, bowel sounds are active.   Neuro: A&O times 3, CN and motor grossly WNL    Labs:   Lab Results   Component Value Date/Time    Cholesterol, total 151 06/17/2022 01:46 PM    Cholesterol, total 158 06/15/2021 09:58 AM    Cholesterol, total 175 06/05/2020 12:42 PM    Cholesterol, total 155 08/22/2019 01:46 PM    Cholesterol, total 138 06/07/2018 10:56 AM    HDL Cholesterol 60 06/17/2022 01:46 PM    HDL Cholesterol 68 06/15/2021 09:58 AM    HDL Cholesterol 62 06/05/2020 12:42 PM    HDL Cholesterol 67 08/22/2019 01:46 PM    HDL Cholesterol 62 06/07/2018 10:56 AM    LDL, calculated 76 06/17/2022 01:46 PM    LDL, calculated 74.8 06/15/2021 09:58 AM    LDL, calculated 90 06/05/2020 12:42 PM    LDL, calculated 77 08/22/2019 01:46 PM    LDL, calculated 62 06/07/2018 10:56 AM    LDL, calculated 90 08/15/2017 01:45 PM    Triglyceride 78 06/17/2022 01:46 PM    Triglyceride 76 06/15/2021 09:58 AM    Triglyceride 116 06/05/2020 12:42 PM    Triglyceride 55 08/22/2019 01:46 PM    Triglyceride 69 06/07/2018 10:56 AM    CHOL/HDL Ratio 2.3 06/15/2021 09:58 AM     No results found for: CPK, CPKX, CPX  Lab Results   Component Value Date/Time    Sodium 143 06/17/2022 01:46 PM    Potassium 4.7 06/17/2022 01:46 PM    Chloride 101 06/17/2022 01:46 PM    CO2 26 06/17/2022 01:46 PM    Anion gap 4 (L) 12/08/2021 09:41 AM    Glucose 91 06/17/2022 01:46 PM    BUN 12 06/17/2022 01:46 PM    Creatinine 0.87 06/17/2022 01:46 PM    BUN/Creatinine ratio 14 06/17/2022 01:46 PM    GFR est AA >60 12/08/2021 09:41 AM    GFR est non-AA >60 12/08/2021 09:41 AM    Calcium 10.3 06/17/2022 01:46 PM    Bilirubin, total 0.3 06/17/2022 01:46 PM    Alk. phosphatase 74 06/17/2022 01:46 PM    Protein, total 7.0 06/17/2022 01:46 PM    Albumin 4.8 06/17/2022 01:46 PM    Globulin 2.8 06/15/2021 09:58 AM    A-G Ratio 2.2 06/17/2022 01:46 PM    ALT (SGPT) 10 06/17/2022 01:46 PM       EKG:  NSR        Assessment:          ICD-10-CM ICD-9-CM    1. Nonrheumatic mitral valve regurgitation  I34.0 424.0 AMB POC EKG ROUTINE W/ 12 LEADS, INTER & REP      2. Essential hypertension with goal blood pressure less than 130/85  I10 401.9 AMB POC EKG ROUTINE W/ 12 LEADS, INTER & REP      3. Pure hypercholesterolemia  E78.00 272.0 AMB POC EKG ROUTINE W/ 12 LEADS, INTER & REP      4. Cardiac murmur  R01.1 785.2           Orders Placed This Encounter    AMB POC EKG ROUTINE W/ 12 LEADS, INTER & REP     Order Specific Question:   Reason for Exam:     Answer:   routine    cholecalciferol (Vitamin D3) (1000 Units /25 mcg) tablet     Sig: Take 1,000 Units by mouth daily.         Plan:     Nonrheumatic mitral valve regurgitation  Echo today showed preliminary normal LVEF, only mild mitral regurgitation  I advised the patient that we will call her if we see a significant difference on the final report  Echo done 6/2021 with preserved LVEF 60-65%, with mild-mod MR  Previous echo in 11/2017 with mild MR, EF 55-60%  Discussed progression  Continue afterload reduction for now    Normal Stress echo in 8/2021     Essential hypertension with goal blood pressure less than 130/85  Controlled. And she states this typically about 10 points lower at home. On Hctz 25 and Losartan 100  Serum Cr 0.87 in 6/2022    Pure hypercholesterolemia  6/2022 LDL 76  Continue statin therapy and low fat, low cholesterol diet  Lipids managed by PCP     GERD  On PPI    Counseled on diet and exercise- eventual goal of 30-60 minutes 5-7 times a week as per AHA guidelines. Patient was made aware during visit today that all testing completed would be instantaneously available on their MyChart for review. Discussed that these results will be made available to the provider at the same time. They were advised to wait at least 3 business days to allow for provider's interpretation of results with follow-up before calling our office with concerns about their results. Continue current care and f/u in 1 year, sooner as needed. Please note that the nurse practitioner helped with precharting, but did not see the patient today, and the entirety of the history, physical, and assessment and plan is mine.      Stephan Hinds MD

## 2022-08-23 LAB
ECHO AO ROOT DIAM: 3.1 CM
ECHO AO ROOT INDEX: 1.57 CM/M2
ECHO AV AREA PEAK VELOCITY: 3.1 CM2
ECHO AV AREA VTI: 3.2 CM2
ECHO AV AREA/BSA PEAK VELOCITY: 1.6 CM2/M2
ECHO AV AREA/BSA VTI: 1.6 CM2/M2
ECHO AV MEAN GRADIENT: 5 MMHG
ECHO AV MEAN VELOCITY: 1.1 M/S
ECHO AV PEAK GRADIENT: 9 MMHG
ECHO AV PEAK VELOCITY: 1.5 M/S
ECHO AV VELOCITY RATIO: 1
ECHO AV VTI: 30.5 CM
ECHO LA DIAMETER INDEX: 1.68 CM/M2
ECHO LA DIAMETER: 3.3 CM
ECHO LA TO AORTIC ROOT RATIO: 1.06
ECHO LA VOL 2C: 40 ML (ref 22–52)
ECHO LA VOL 4C: 44 ML (ref 22–52)
ECHO LA VOL BP: 44 ML (ref 22–52)
ECHO LA VOL/BSA BIPLANE: 22 ML/M2 (ref 16–34)
ECHO LA VOLUME AREA LENGTH: 47 ML
ECHO LA VOLUME INDEX A2C: 20 ML/M2 (ref 16–34)
ECHO LA VOLUME INDEX A4C: 22 ML/M2 (ref 16–34)
ECHO LA VOLUME INDEX AREA LENGTH: 24 ML/M2 (ref 16–34)
ECHO LV E' LATERAL VELOCITY: 10 CM/S
ECHO LV E' SEPTAL VELOCITY: 8 CM/S
ECHO LV EDV A2C: 40 ML
ECHO LV EDV A4C: 44 ML
ECHO LV EDV BP: 43 ML (ref 56–104)
ECHO LV EDV INDEX A4C: 22 ML/M2
ECHO LV EDV INDEX BP: 22 ML/M2
ECHO LV EDV NDEX A2C: 20 ML/M2
ECHO LV EJECTION FRACTION A2C: 60 %
ECHO LV EJECTION FRACTION A4C: 67 %
ECHO LV EJECTION FRACTION BIPLANE: 64 % (ref 55–100)
ECHO LV ESV A2C: 16 ML
ECHO LV ESV A4C: 15 ML
ECHO LV ESV BP: 15 ML (ref 19–49)
ECHO LV ESV INDEX A2C: 8 ML/M2
ECHO LV ESV INDEX A4C: 8 ML/M2
ECHO LV ESV INDEX BP: 8 ML/M2
ECHO LV FRACTIONAL SHORTENING: 38 % (ref 28–44)
ECHO LV INTERNAL DIMENSION DIASTOLE INDEX: 2.03 CM/M2
ECHO LV INTERNAL DIMENSION DIASTOLIC: 4 CM (ref 3.9–5.3)
ECHO LV INTERNAL DIMENSION SYSTOLIC INDEX: 1.27 CM/M2
ECHO LV INTERNAL DIMENSION SYSTOLIC: 2.5 CM
ECHO LV IVSD: 0.9 CM (ref 0.6–0.9)
ECHO LV MASS 2D: 109.7 G (ref 67–162)
ECHO LV MASS INDEX 2D: 55.7 G/M2 (ref 43–95)
ECHO LV POSTERIOR WALL DIASTOLIC: 0.9 CM (ref 0.6–0.9)
ECHO LV RELATIVE WALL THICKNESS RATIO: 0.45
ECHO LVOT AREA: 3.1 CM2
ECHO LVOT AV VTI INDEX: 0.99
ECHO LVOT DIAM: 2 CM
ECHO LVOT MEAN GRADIENT: 5 MMHG
ECHO LVOT PEAK GRADIENT: 8 MMHG
ECHO LVOT PEAK VELOCITY: 1.5 M/S
ECHO LVOT STROKE VOLUME INDEX: 48.1 ML/M2
ECHO LVOT SV: 94.8 ML
ECHO LVOT VTI: 30.2 CM
ECHO MV A VELOCITY: 0.9 M/S
ECHO MV E DECELERATION TIME (DT): 289.5 MS
ECHO MV E VELOCITY: 0.82 M/S
ECHO MV E/A RATIO: 0.91
ECHO MV E/E' LATERAL: 8.2
ECHO MV E/E' RATIO (AVERAGED): 9.23
ECHO MV E/E' SEPTAL: 10.25
ECHO PULMONARY ARTERY END DIASTOLIC PRESSURE: 5 MMHG
ECHO PV MAX VELOCITY: 1 M/S
ECHO PV PEAK GRADIENT: 4 MMHG
ECHO PV REGURGITANT MAX VELOCITY: 1.1 M/S
ECHO RV TAPSE: 2.1 CM (ref 1.7–?)
ECHO TV REGURGITANT MAX VELOCITY: 2.62 M/S
ECHO TV REGURGITANT PEAK GRADIENT: 28 MMHG

## 2022-08-24 ENCOUNTER — TELEPHONE (OUTPATIENT)
Dept: CARDIOLOGY CLINIC | Age: 72
End: 2022-08-24

## 2022-08-24 NOTE — PROGRESS NOTES
Just FLOWER---already discussed during her OV with Doc Ruben Garcia. Normal heart pumping strength,  mild leakage or stiffness across mitral valve but stable.

## 2022-09-01 ENCOUNTER — APPOINTMENT (OUTPATIENT)
Dept: CT IMAGING | Age: 72
End: 2022-09-01
Attending: EMERGENCY MEDICINE
Payer: MEDICARE

## 2022-09-01 ENCOUNTER — HOSPITAL ENCOUNTER (EMERGENCY)
Age: 72
Discharge: HOME | End: 2022-09-01
Attending: EMERGENCY MEDICINE
Payer: MEDICARE

## 2022-09-01 VITALS
HEART RATE: 63 BPM | BODY MASS INDEX: 30.97 KG/M2 | TEMPERATURE: 98.1 F | HEIGHT: 66 IN | WEIGHT: 192.68 LBS | RESPIRATION RATE: 14 BRPM | DIASTOLIC BLOOD PRESSURE: 79 MMHG | SYSTOLIC BLOOD PRESSURE: 135 MMHG | OXYGEN SATURATION: 99 %

## 2022-09-01 DIAGNOSIS — K62.5 RECTAL BLEEDING: Primary | ICD-10-CM

## 2022-09-01 LAB
ALBUMIN SERPL-MCNC: 4.2 G/DL (ref 3.5–5)
ALBUMIN/GLOB SERPL: 1.2 {RATIO} (ref 1.1–2.2)
ALP SERPL-CCNC: 70 U/L (ref 45–117)
ALT SERPL-CCNC: 17 U/L (ref 12–78)
ANION GAP SERPL CALC-SCNC: 7 MMOL/L (ref 5–15)
AST SERPL-CCNC: 20 U/L (ref 15–37)
BASOPHILS # BLD: 0 K/UL (ref 0–0.1)
BASOPHILS NFR BLD: 1 % (ref 0–1)
BILIRUB SERPL-MCNC: 0.5 MG/DL (ref 0.2–1)
BUN SERPL-MCNC: 16 MG/DL (ref 6–20)
BUN/CREAT SERPL: 16 (ref 12–20)
CALCIUM SERPL-MCNC: 9.7 MG/DL (ref 8.5–10.1)
CHLORIDE SERPL-SCNC: 106 MMOL/L (ref 97–108)
CO2 SERPL-SCNC: 26 MMOL/L (ref 21–32)
CREAT SERPL-MCNC: 0.98 MG/DL (ref 0.55–1.02)
DIFFERENTIAL METHOD BLD: NORMAL
EOSINOPHIL # BLD: 0.1 K/UL (ref 0–0.4)
EOSINOPHIL NFR BLD: 3 % (ref 0–7)
ERYTHROCYTE [DISTWIDTH] IN BLOOD BY AUTOMATED COUNT: 13.4 % (ref 11.5–14.5)
GLOBULIN SER CALC-MCNC: 3.5 G/DL (ref 2–4)
GLUCOSE SERPL-MCNC: 118 MG/DL (ref 65–100)
HCT VFR BLD AUTO: 35.6 % (ref 35–47)
HGB BLD-MCNC: 12.1 G/DL (ref 11.5–16)
IMM GRANULOCYTES # BLD AUTO: 0 K/UL (ref 0–0.04)
IMM GRANULOCYTES NFR BLD AUTO: 0 % (ref 0–0.5)
LIPASE SERPL-CCNC: 92 U/L (ref 73–393)
LYMPHOCYTES # BLD: 2 K/UL (ref 0.8–3.5)
LYMPHOCYTES NFR BLD: 46 % (ref 12–49)
MCH RBC QN AUTO: 30.8 PG (ref 26–34)
MCHC RBC AUTO-ENTMCNC: 34 G/DL (ref 30–36.5)
MCV RBC AUTO: 90.6 FL (ref 80–99)
MONOCYTES # BLD: 0.3 K/UL (ref 0–1)
MONOCYTES NFR BLD: 8 % (ref 5–13)
NEUTS SEG # BLD: 1.8 K/UL (ref 1.8–8)
NEUTS SEG NFR BLD: 42 % (ref 32–75)
NRBC # BLD: 0 K/UL (ref 0–0.01)
NRBC BLD-RTO: 0 PER 100 WBC
PLATELET # BLD AUTO: 332 K/UL (ref 150–400)
PMV BLD AUTO: 10.1 FL (ref 8.9–12.9)
POTASSIUM SERPL-SCNC: 3.9 MMOL/L (ref 3.5–5.1)
PROT SERPL-MCNC: 7.7 G/DL (ref 6.4–8.2)
RBC # BLD AUTO: 3.93 M/UL (ref 3.8–5.2)
SODIUM SERPL-SCNC: 139 MMOL/L (ref 136–145)
WBC # BLD AUTO: 4.2 K/UL (ref 3.6–11)

## 2022-09-01 PROCEDURE — 74178 CT ABD&PLV WO CNTR FLWD CNTR: CPT

## 2022-09-01 PROCEDURE — 85025 COMPLETE CBC W/AUTO DIFF WBC: CPT

## 2022-09-01 PROCEDURE — 36415 COLL VENOUS BLD VENIPUNCTURE: CPT

## 2022-09-01 PROCEDURE — 83690 ASSAY OF LIPASE: CPT

## 2022-09-01 PROCEDURE — 96374 THER/PROPH/DIAG INJ IV PUSH: CPT

## 2022-09-01 PROCEDURE — 80053 COMPREHEN METABOLIC PANEL: CPT

## 2022-09-01 PROCEDURE — 74011250636 HC RX REV CODE- 250/636: Performed by: EMERGENCY MEDICINE

## 2022-09-01 PROCEDURE — 99284 EMERGENCY DEPT VISIT MOD MDM: CPT

## 2022-09-01 RX ORDER — SUCRALFATE 1 G/1
1 TABLET ORAL 4 TIMES DAILY
Qty: 30 TABLET | Refills: 0 | Status: SHIPPED | OUTPATIENT
Start: 2022-09-01

## 2022-09-01 RX ORDER — ONDANSETRON 2 MG/ML
4 INJECTION INTRAMUSCULAR; INTRAVENOUS
Status: COMPLETED | OUTPATIENT
Start: 2022-09-01 | End: 2022-09-01

## 2022-09-01 RX ORDER — ONDANSETRON 4 MG/1
4 TABLET, ORALLY DISINTEGRATING ORAL
Qty: 10 TABLET | Refills: 0 | Status: SHIPPED | OUTPATIENT
Start: 2022-09-01

## 2022-09-01 RX ADMIN — ONDANSETRON 4 MG: 2 INJECTION INTRAMUSCULAR; INTRAVENOUS at 09:41

## 2022-09-01 NOTE — ED NOTES
74 yo A/Ox4 female in NAD reports BM this morning describing hematochezia and some suspected clot presence; singular event; reports hx colonoscopy, hemorrhoid repair; denies n/v/d, hx crohn, colitis

## 2022-09-01 NOTE — ED NOTES
DC papers reviewed and in hand, pt verbalized understanding. Denies questions or concerns, no acute distress noted.

## 2022-09-01 NOTE — DISCHARGE INSTRUCTIONS
You should be on stool softener such as Miralax daily to keep stools soft    Return to the ED if symptoms worsen or are not improving

## 2022-09-02 RX ORDER — HYDROCORTISONE ACETATE 25 MG/1
25 SUPPOSITORY RECTAL EVERY 12 HOURS
Qty: 10 SUPPOSITORY | Refills: 0 | Status: SHIPPED | OUTPATIENT
Start: 2022-09-02 | End: 2022-09-07

## 2022-09-04 NOTE — ED PROVIDER NOTES
EMERGENCY DEPARTMENT HISTORY AND PHYSICAL EXAM      Date: 9/1/2022  Patient Name: Daniel Tsang    History of Presenting Illness     Chief Complaint   Patient presents with    Rectal Bleeding     Passed large rectal bleeding this morning with clots one time at 7am; stomach was nauseated last night. No vomiting. History Provided By: Patient    HPI: Daniel Tsang, 73 y.o. female presents to the ED with cc of rectal bleeding. Pt states she has had prior abdominal issues primarily epigastric, had been seen recently with gastric wall thickening and was placed on PPI, had EGD scheduled that had to be post-poned. This morning she had a bowel movement and noted bright red blood in toilet, mod in severity. There has been no abdominal pain. She denies fever or chills. She denies any cough or cold symptoms. There has been no CP or SOA. There has been no n/v/d. Pt states she has been straining to have BM's which have been hard and small. She denies head, lightheadness. She denies any exertional SOA or positional dizziness. There are no other complaints, changes, or physical findings at this time. PCP: Gretchen Ramos MD    No current facility-administered medications on file prior to encounter. Current Outpatient Medications on File Prior to Encounter   Medication Sig Dispense Refill    cholecalciferol (Vitamin D3) (1000 Units /25 mcg) tablet Take 1,000 Units by mouth daily. simvastatin (ZOCOR) 40 mg tablet TAKE 1 TABLET BY MOUTH DAILY 90 Tablet 1    losartan (COZAAR) 100 mg tablet TAKE 1 TABLET BY MOUTH DAILY 90 Tablet 1    varicella-zoster recombinant, PF, (Shingrix, PF,) 50 mcg/0.5 mL susr injection 0.5mL by IntraMUSCular route once now and then repeat in 2-6 months (Patient not taking: Reported on 8/22/2022) 0.5 mL 1    hydroCHLOROthiazide (HYDRODIURIL) 25 mg tablet TAKE 1 TABLET BY MOUTH EVERY DAY 90 Tablet 0    pantoprazole (PROTONIX) 40 mg tablet TAKE 1 TABLET BY MOUTH ONCE DAILY.  180 Tablet 1    acetaminophen (TYLENOL) 650 mg TbER Take 650 mg by mouth every eight (8) hours. timolol (TIMOPTIC) 0.5 % ophthalmic solution Administer 1 Drop to both eyes two (2) times a day. polyethylene glycol (MIRALAX) 17 gram packet Take 17 g by mouth as needed. Past History     Past Medical History:  Past Medical History:   Diagnosis Date    Arthritis     narrowing of spine    Environmental and seasonal allergies     GERD - Esophagitis - reflux 3/22/2011    Headache(784.0) 6/27/2011    Hypercholesterolemia 3/22/2011    Hypertension 3/22/2011    Other ill-defined conditions(799.89)     high cholesterol, vertigo, migraine    Ulcer        Past Surgical History:  Past Surgical History:   Procedure Laterality Date    COLONOSCOPY N/A 2/28/2018    COLONOSCOPY performed by Alessio Berger MD at [facility] ENDOSCOPY    HX BREAST BIOPSY Left 1980s    benign cyst    HX GI      hemmorhoidectomy    HX HYSTERECTOMY      HX OTHER SURGICAL      \"hemrrhoid surgery\"    MS BREAST SURGERY PROCEDURE UNLISTED         Family History:  Family History   Problem Relation Age of Onset    Hypertension Mother     Hypertension Father        Social History:  Social History     Tobacco Use    Smoking status: Never    Smokeless tobacco: Never   Substance Use Topics    Alcohol use: No    Drug use: No       Allergies:  No Known Allergies      Review of Systems   Review of Systems   Constitutional: Negative. Negative for appetite change, chills, fatigue and fever. HENT: Negative. Negative for congestion, rhinorrhea, sinus pressure and sore throat. Eyes: Negative. Respiratory: Negative. Negative for cough, choking, chest tightness, shortness of breath and wheezing. Cardiovascular: Negative. Negative for chest pain, palpitations and leg swelling. Gastrointestinal:  Positive for blood in stool. Negative for abdominal pain, constipation, diarrhea, nausea and vomiting. Endocrine: Negative. Genitourinary: Negative.   Negative for difficulty urinating, dysuria, flank pain and urgency. Musculoskeletal: Negative. Skin: Negative. Neurological: Negative. Negative for dizziness, speech difficulty, weakness, light-headedness, numbness and headaches. Psychiatric/Behavioral: Negative. All other systems reviewed and are negative. Physical Exam   Physical Exam  Vitals and nursing note reviewed. Constitutional:       General: She is not in acute distress. Appearance: She is well-developed. She is obese. She is not ill-appearing or diaphoretic. HENT:      Head: Normocephalic and atraumatic. Mouth/Throat:      Mouth: Mucous membranes are moist.      Pharynx: No oropharyngeal exudate. Eyes:      General: No scleral icterus. Extraocular Movements: Extraocular movements intact. Conjunctiva/sclera: Conjunctivae normal.      Pupils: Pupils are equal, round, and reactive to light. Neck:      Vascular: No JVD. Trachea: No tracheal deviation. Cardiovascular:      Rate and Rhythm: Normal rate and regular rhythm. Heart sounds: Normal heart sounds. No murmur heard. Pulmonary:      Effort: Pulmonary effort is normal. No respiratory distress. Breath sounds: Normal breath sounds. No stridor. No wheezing or rales. Abdominal:      General: There is no distension. Palpations: Abdomen is soft. Tenderness: There is no abdominal tenderness. There is no guarding or rebound. Comments: Rectal exam- bright red blood, no stool in vault. Musculoskeletal:         General: Normal range of motion. Cervical back: Normal range of motion and neck supple. Right lower leg: No edema. Left lower leg: No edema. Skin:     General: Skin is warm and dry. Capillary Refill: Capillary refill takes less than 2 seconds. Neurological:      Mental Status: She is alert and oriented to person, place, and time. Cranial Nerves: No cranial nerve deficit.       Comments: No gross motor or sensory deficits    Psychiatric:         Mood and Affect: Mood normal.         Behavior: Behavior normal.       Diagnostic Study Results     Labs -     CBC WITH AUTOMATED DIFF (Final result)   Component (Lab Inquiry)  Collection Time Result Time WBC RBC HGB HCT MCV   09/01/22 08:00:00 09/01/22 08:16:13 4.2 3.93 12.1 35.6 90.6       Collection Time Result Time MCH MCHC RDW PLT MPLV   09/01/22 08:00:00 09/01/22 08:16:13 30.8 34.0 13.4 332 10.1       Collection Time Result Time NRBC ANRBC GRANS LYMPH MONOS   09/01/22 08:00:00 09/01/22 08:16:13 0.0 0.00 42 46 8       Collection Time Result Time EOS BASOS IG ABG ABL   09/01/22 08:00:00 09/01/22 08:16:13 3 1 0 1.8 2.0       Collection Time Result Time ABM ANNMARIE ABB AIG DF   09/01/22 08:00:00 09/01/22 08:16:13 0.3 0.1 0.0 0.0 AUTOMATED         Final result                           Contains abnormal data METABOLIC PANEL, COMPREHENSIVE (Final result)   Component (Lab Inquiry)  Collection Time Result Time NA K CL CO2 AGAP   09/01/22 08:00:00 09/01/22 08:59:35 139 3.9   Sample is hemolyzed (h... 106 26 7       Collection Time Result Time GLU BUN CREA BUCR GFRAA   09/01/22 08:00:00 09/01/22 08:59:35 118 High  16 0.98 16 >60       Collection Time Result Time GFRNA CA TBIL GPT SGOT   09/01/22 08:00:00 09/01/22 08:59:35 56   Estimated GFR is calcu. .. Low  9.7 0.5 17 20       Collection Time Result Time AP TP ALB GLOB AGRAT   09/01/22 08:00:00 09/01/22 08:59:35 70 7.7 4.2 3.5 1.2         Final result                          LIPASE (Final result)   Component (Lab Inquiry)  Collection Time Result Time LPSE   09/01/22 08:00:00 09/01/22 08:59:35 92         Final result                    Radiologic Studies -   CT ABD PELV W WO CONT   Final Result   No evidence of active GI bleeding. There is again suggestion of wall thickening   involving the antrum. Underlying peptic ulcer disease or gastritis should be   considered. No acute abnormality.         CT Results  (Last 48 hours)      None CXR Results  (Last 48 hours)      None            Medical Decision Making   I am the first provider for this patient. I reviewed the vital signs, available nursing notes, past medical history, past surgical history, family history and social history. Vital Signs-Reviewed the patient's vital signs. Records Reviewed: Nursing Notes, Old Medical Records, Previous Radiology Studies, and Previous Laboratory Studies  Recent CT with gastric wall thickening, EGD had been scheduled with Dr. Dorcas Treviño. Provider Notes (Medical Decision Making):   DDx- Acute blood loss anemia, internal hemorrhoids, diverticular bleed, diverticulitis, colitis,     ED Course:   Initial assessment performed. The patients presenting problems have been discussed, and they are in agreement with the care plan formulated and outlined with them. I have encouraged them to ask questions as they arise throughout their visit. Pt did had one BM here, bright red blood in toilet. Discussed. CT and labs reassuring. Discussed based on presentation most likely to be int hemorrhoidal as she has niot had any epigastric pain, n/v. She has been straining to have BM. Discussed will treat as internal hemorrhoids bleeding return to the ED with any worsening of symptoms as well as to discuss with her GI provider. Disposition:    DC- Adult Discharges: All of the diagnostic tests were reviewed and questions answered. Diagnosis, care plan and treatment options were discussed. The patient understands the instructions and will follow up as directed. The patients results have been reviewed with them. They have been counseled regarding their diagnosis. The patient verbally convey understanding and agreement of the signs, symptoms, diagnosis, treatment and prognosis and additionally agrees to follow up as recommended with their PCP in 24 - 48 hours. They also agree with the care-plan and convey that all of their questions have been answered.   I have also put together some discharge instructions for them that include: 1) educational information regarding their diagnosis, 2) how to care for their diagnosis at home, as well a 3) list of reasons why they would want to return to the ED prior to their follow-up appointment, should their condition change. DISCHARGE PLAN:  1. Discharge Medication List as of 9/1/2022 10:33 AM        START taking these medications    Details   hydrocortisone-pramoxine (PROCTOFOAM HC) rectal foam Insert 1 Applicator into rectum two (2) times a day., Normal, Disp-1 Each, R-0      sucralfate (Carafate) 1 gram tablet Take 1 Tablet by mouth four (4) times daily. , Normal, Disp-30 Tablet, R-0      ondansetron (ZOFRAN ODT) 4 mg disintegrating tablet Take 1 Tablet by mouth every eight (8) hours as needed for Nausea or Vomiting., Normal, Disp-10 Tablet, R-0           CONTINUE these medications which have NOT CHANGED    Details   cholecalciferol (Vitamin D3) (1000 Units /25 mcg) tablet Take 1,000 Units by mouth daily. , Historical Med      simvastatin (ZOCOR) 40 mg tablet TAKE 1 TABLET BY MOUTH DAILY, Normal, Disp-90 Tablet, R-1      losartan (COZAAR) 100 mg tablet TAKE 1 TABLET BY MOUTH DAILY, Normal, Disp-90 Tablet, R-1      varicella-zoster recombinant, PF, (Shingrix, PF,) 50 mcg/0.5 mL susr injection 0.5mL by IntraMUSCular route once now and then repeat in 2-6 months, Print, Disp-0.5 mL, R-1      hydroCHLOROthiazide (HYDRODIURIL) 25 mg tablet TAKE 1 TABLET BY MOUTH EVERY DAY, Normal, Disp-90 Tablet, R-0      pantoprazole (PROTONIX) 40 mg tablet TAKE 1 TABLET BY MOUTH ONCE DAILY., Normal, Disp-180 Tablet, R-1      acetaminophen (TYLENOL) 650 mg TbER Take 650 mg by mouth every eight (8) hours. , Historical Med      timolol (TIMOPTIC) 0.5 % ophthalmic solution Administer 1 Drop to both eyes two (2) times a day., Historical Med      polyethylene glycol (MIRALAX) 17 gram packet Take 17 g by mouth as needed., Historical Med           2. Follow-up Information       Follow up With Specialties Details Why Contact Info    Bowen Salcido MD Internal Medicine Physician   [address] [facility]  [address] [facility]      Jonny Jessica MD Gastroenterology   [address]  [address] [address] [phone]      [facility] EMERGENCY DEPT Emergency Medicine  If symptoms worsen [address]  [address] [facility]  [phone]          3. Return to ED if worse     Diagnosis     Clinical Impression:   1. Rectal bleeding        Attestations:    Oralia Kennedy, DO        Please note that this dictation was completed with [employer], the computer voice recognition software. Quite often unanticipated grammatical, syntax, homophones, and other interpretive errors are inadvertently transcribed by the computer software. Please disregard these errors. Please excuse any errors that have escaped final proofreading. Thank you.

## 2022-10-07 RX ORDER — HYDROCHLOROTHIAZIDE 25 MG/1
TABLET ORAL
Qty: 90 TABLET | Refills: 0 | Status: SHIPPED | OUTPATIENT
Start: 2022-10-07

## 2022-11-07 ENCOUNTER — TRANSCRIBE ORDER (OUTPATIENT)
Dept: SCHEDULING | Age: 72
End: 2022-11-07

## 2022-11-07 DIAGNOSIS — Z12.31 SCREENING MAMMOGRAM FOR HIGH-RISK PATIENT: Primary | ICD-10-CM

## 2022-12-02 NOTE — PROGRESS NOTES
HISTORY OF PRESENT ILLNESS  Kate Guerrero is a 81 y.o. female. HPI  Last here 6/6/22. Pt is here for routine care. Has a history of hypertension  BP today is 152/77, 152/76 on repeat  BP at home 130s-140s  Pt is on losartan 100 mg and hctz 25 mg now separate, took yesterday afternoon, but had a stressful drive in  Emphasized not missing doses  Will add norvasc 2.5 mg  Recall pt had a cough on lisinopril. She was taking this medication for 20 years. Wt today is 195 lbs, up 2 lbs since lov   Discussed diet and wt/l    Reviewed labs   Ordered labs    She is now following with Dr. Tirso Anderson (ortho) for BL knee pain   Lov 3/18/22  She had injections in both knee this helped     Pt follows with Dr. Sienna Jones (GI) for nausea and hx diarrhea  Last visit was 5/22 - she had been having a lot of nausea  Pt is now taking protonix once/day (down from BID)   Previously took amitiza prn but no longer on this, had hemorrhoid surgery. Reviewed CT abd pelv 7/1/22:  Impression:       1. There is questionable soft tissue thickening of the wall of the distal antrum   which may be related to incomplete distention and correlation would be helpful. 2. No acute intra-abdominal process is identified. Has not f/U'd recently, discussed f/U as she is supposed to have an endoscopy she reports the endoscopy was scheduled--needs to have egd--this was scheduled but she canceled and has not yet rescheduled it stressed that she needs to call and get this back on the books endoscopy was scheduled for an abnormal CT which required follow-up the abnormality was redemonstrated on repeat CT completed in the emergency department she expressed understanding of plan will call the GI clinic and will let us know if she has difficulty and we can assist her    She presented to ED 9/1/22 for rectal bleeding   Had an EGD scheduled 9/7/22, needs to be r/s'd, reminded   Reviewed CT abd pelv 9/1/22: IMPRESSION  No evidence of active GI bleeding.  There is again suggestion of wall thickening  involving the antrum. Underlying peptic ulcer disease or gastritis should be  considered. No acute abnormality      Pt sees Dr. Shila Galindo (cardio) for mitral valve regurgitation  Last there 8/22/22  Reviewed note:   Plan:    Nonrheumatic mitral valve regurgitation  Echo today showed preliminary normal LVEF, only mild mitral regurgitation  I advised the patient that we will call her if we see a significant difference on the final report  Echo done 6/2021 with preserved LVEF 60-65%, with mild-mod MR  Previous echo in 11/2017 with mild MR, EF 55-60%  Discussed progression  Continue afterload reduction for now  Normal Stress echo in 8/2021  Essential hypertension with goal blood pressure less than 130/85  Controlled. And she states this typically about 10 points lower at home. On Hctz 25 and Losartan 100  Serum Cr 0.87 in 6/2022  Pure hypercholesterolemia  6/2022 LDL 76  Continue statin therapy and low fat, low cholesterol diet  Lipids managed by PCP  GERD  On PPI  Reviewed echo 8/22/22:    Left Ventricle: Normal left ventricular systolic function with a visually estimated EF of 60 - 65%. Left ventricle size is normal. Normal wall thickness. Normal wall motion. Normal diastolic function. Mitral Valve: Mild regurgitation. Recall stress echo 8/27/21:  Baseline ECG: Normal sinus rhythm. Stress test: Negative stress test.  Echo: Normal stress echocardiogram.    Pt has a hx of chronic recurrent cough x 9/2017  She has seen Dr. Patrica Bell (ENT) about this in 2019 and is currently following with GI (see above)  She takes zyrtec in AM, Claritin at night, and flonase which improved her symptoms in the past  We have also tried a variety of different inhalers in the past which have helped she had Advair at one point Incruse and Breo at 1 point currently not taking any inhalers  Pt notes she is still having a chronic cough x 2-3 months recurrent  Ordered CXR. Refilled advair inhaler. Referred to pulmonology. Continues on zocor 40mg daily for cholesterol      Has xanax to use prn - has not used this at all in a long time     She was given oral estrogen from gyn for hot flashes, not longer taking this       Pt lives with her       ACP not on file. SDM is her  Monet Vick). Provided this info in the past     PREVENTIVE:  Colonoscopy:  with Dr. Alf Agudelo, 10 yr f/u  EGD: 18 with Dr. Alf Agudelo  AAA: [facility]  Pap: Dr. Essence Stewart, 3/21  Mammogram:21 negative, scheduled 23  DEXA: 21, mild osteopenia, due  ordered   Tdap:  due reminded, was told insurance did not cover it  Pneumovax: 2017, will update 22  [id number]: 18   Shingles: she tried, was told insurance did not cover it  Flu shot: declines    A1c:  6.2  6.0  5.9  5.8  6.2  Eye exam: Dr. Chin Johnson,  has eye drops for glaucoma, scheduled    Hep C screen:  negative  Lipids:  LDL 76  EK/3/17, nsr with cardio  Covid: three doses (moderna)    Patient Active Problem List    Diagnosis Date Noted    Nonrheumatic mitral valve regurgitation 2021    Essential hypertension with goal blood pressure less than 130/85 2016    Pure hypercholesterolemia 2016    Osteopenia 2012    Constipation 2011    Glaucoma 2011    Vertigo 2011    Headache(784.0) 2011    Esophagitis 2011     Current Outpatient Medications   Medication Sig Dispense Refill    pantoprazole (PROTONIX) 40 mg tablet TAKE 1 TABLET BY MOUTH ONCE DAILY. 180 Tablet 0    hydroCHLOROthiazide (HYDRODIURIL) 25 mg tablet TAKE 1 TABLET BY MOUTH EVERY DAY 90 Tablet 0    hydrocortisone-pramoxine (PROCTOFOAM HC) rectal foam Insert 1 Applicator into rectum two (2) times a day. 1 Each 0    sucralfate (Carafate) 1 gram tablet Take 1 Tablet by mouth four (4) times daily.  30 Tablet 0    ondansetron (ZOFRAN ODT) 4 mg disintegrating tablet Take 1 Tablet by mouth every eight (8) hours as needed for Nausea or Vomiting. 10 Tablet 0    cholecalciferol (Vitamin D3) (1000 Units /25 mcg) tablet Take 1,000 Units by mouth daily. simvastatin (ZOCOR) 40 mg tablet TAKE 1 TABLET BY MOUTH DAILY 90 Tablet 1    losartan (COZAAR) 100 mg tablet TAKE 1 TABLET BY MOUTH DAILY 90 Tablet 1    varicella-zoster recombinant, PF, (Shingrix, PF,) 50 mcg/0.5 mL susr injection 0.5mL by IntraMUSCular route once now and then repeat in 2-6 months (Patient not taking: Reported on 8/22/2022) 0.5 mL 1    acetaminophen (TYLENOL) 650 mg TbER Take 650 mg by mouth every eight (8) hours. timolol (TIMOPTIC) 0.5 % ophthalmic solution Administer 1 Drop to both eyes two (2) times a day. polyethylene glycol (MIRALAX) 17 gram packet Take 17 g by mouth as needed.        Past Surgical History:   Procedure Laterality Date    COLONOSCOPY N/A 2/28/2018    COLONOSCOPY performed by Wilfredo Huitron MD at [facility] ENDOSCOPY    HX BREAST BIOPSY Left 1980s    benign cyst    HX GI      hemmorhoidectomy    HX HYSTERECTOMY      HX OTHER SURGICAL      \"hemrrhoid surgery\"    SD BREAST SURGERY PROCEDURE UNLISTED        Lab Results   Component Value Date/Time    WBC 4.2 09/01/2022 08:00 AM    HGB 12.1 09/01/2022 08:00 AM    HCT 35.6 09/01/2022 08:00 AM    PLATELET 594 56/41/4069 08:00 AM    MCV 90.6 09/01/2022 08:00 AM     Lab Results   Component Value Date/Time    Cholesterol, total 151 06/17/2022 01:46 PM    HDL Cholesterol 60 06/17/2022 01:46 PM    LDL, calculated 76 06/17/2022 01:46 PM    LDL, calculated 74.8 06/15/2021 09:58 AM    Triglyceride 78 06/17/2022 01:46 PM    CHOL/HDL Ratio 2.3 06/15/2021 09:58 AM     Lab Results   Component Value Date/Time    GFR est non-AA 56 (L) 09/01/2022 08:00 AM    GFR est AA >60 09/01/2022 08:00 AM    Creatinine 0.98 09/01/2022 08:00 AM    BUN 16 09/01/2022 08:00 AM    Sodium 139 09/01/2022 08:00 AM    Potassium 3.9 09/01/2022 08:00 AM    Chloride 106 09/01/2022 08:00 AM    CO2 26 09/01/2022 08:00 AM    Magnesium 1.9 09/03/2014 10:55 AM        Review of Systems   Respiratory:  Negative for shortness of breath. Cardiovascular:  Negative for chest pain. Physical Exam  Constitutional:       General: She is not in acute distress. Appearance: She is not ill-appearing, toxic-appearing or diaphoretic. HENT:      Head: Normocephalic and atraumatic. Right Ear: External ear normal.      Left Ear: External ear normal.   Eyes:      General:         Right eye: No discharge. Left eye: No discharge. Conjunctiva/sclera: Conjunctivae normal.      Pupils: Pupils are equal, round, and reactive to light. Cardiovascular:      Rate and Rhythm: Normal rate and regular rhythm. Heart sounds: No murmur heard. No friction rub. No gallop. Pulmonary:      Effort: No respiratory distress. Breath sounds: Normal breath sounds. No wheezing or rales. Chest:      Chest wall: No tenderness. Musculoskeletal:         General: Normal range of motion. Cervical back: Normal.      Right lower leg: No edema. Left lower leg: No edema. Skin:     General: Skin is warm and dry. Neurological:      Mental Status: She is oriented to person, place, and time. Mental status is at baseline. Coordination: Coordination normal.      Gait: Gait normal.   Psychiatric:         Mood and Affect: Mood normal.         Behavior: Behavior normal.       ASSESSMENT and PLAN high complexity medical decision making    ICD-10-CM ICD-9-CM    1. Postmenopausal state  Z78.0 V49.81 DEXA BONE DENSITY STUDY AXIAL      METABOLIC PANEL, BASIC      HEMOGLOBIN A1C WITH EAG   Bone density due ordered   METABOLIC PANEL, BASIC      HEMOGLOBIN A1C WITH EAG      2.  Chronic cough  R05.3 786.2 XR CHEST PA LAT      REFERRAL TO PULMONARY DISEASE      METABOLIC PANEL, BASIC   This is not a new problem this is a recurrent issue    We have been dealing with a cough on and off since at least 2017    It sporadically improves    She has seen ENT and GI in the past of note she is overdue to see GI needs to have an endoscopy    She is taking Protonix    She is currently taking her Zyrtec and Flonase which previously had improved her cough    Recall at 1 point when the cough is bothering her a lot it resolved when she was in different state leading us to believe that there were some allergens in the current area that were contributing to her cough    She has been on inhalers in the past which had seem to help but is not taking any of these inhalers currently    We will go ahead and give her Advair and see if this improves her symptoms and we will set her up with pulmonary for PFTs    We will also repeat chest x-ray as this has not been done in several years now   HEMOGLOBIN A1C WITH EAG      METABOLIC PANEL, BASIC      HEMOGLOBIN A1C WITH EAG      3. Esophagitis  U76.01 448.26 METABOLIC PANEL, BASIC      HEMOGLOBIN A1C WITH EAG      METABOLIC PANEL, BASIC   Patient with abnormal CT showing thickening in the antral area twice in July and then again in September she was supposed to have an endoscopy completed in September but she canceled it and has never rescheduled    Stressed the importance of compliance and follow-through and following up on these abnormalities she does need the endoscopy to complete    She needs to call them and get it scheduled if she has difficulty she will let us know and we can help her schedule her endoscopy    Continue Protonix   HEMOGLOBIN A1C WITH EAG      4. Essential hypertension with goal blood pressure less than 130/85  J29 947.2 METABOLIC PANEL, BASIC      HEMOGLOBIN A1C WITH EAG   Poorly controlled    Continue losartan 100 mg and hydrochlorothiazide daily    Add Norvasc 2.5 mg daily   METABOLIC PANEL, BASIC      HEMOGLOBIN A1C WITH EAG      5.  Pure hypercholesterolemia  Y30.06 122.4 METABOLIC PANEL, BASIC      HEMOGLOBIN A1C WITH EAG   On Zocor 40 mg daily Norvasc is low-dose if we need to increase this I will likely change Zocor to Lipitor to help with drug interaction   METABOLIC PANEL, BASIC      HEMOGLOBIN A1C WITH EAG      6. Nonrheumatic mitral valve regurgitation  L94.5 599.6 METABOLIC PANEL, BASIC      HEMOGLOBIN A1C WITH EAG   Follows with cardiology recently had echocardiogram stable   METABOLIC PANEL, BASIC      HEMOGLOBIN A1C WITH EAG      7. IFG (impaired fasting glucose)  Q68.98 598.77 METABOLIC PANEL, BASIC      HEMOGLOBIN A1C WITH EAG      METABOLIC PANEL, BASIC   Check A1c diet controlled   HEMOGLOBIN A1C WITH EAG        Depression screen reviewed and negative. Scribed by Babatunde Harding, as dictated by Dr. Amos Vallejo. Current diagnosis and concerns discussed with pt at length. Pt understands risks and benefits or current treatment plan and medications, and accepts the treatment and medication with any possible risks. Pt asks appropriate questions, which were answered. Pt was instructed to call with any concerns or problems. I have reviewed the note documented by the scribe. The services provided are my own. The documentation is accurate.

## 2022-12-06 ENCOUNTER — OFFICE VISIT (OUTPATIENT)
Dept: INTERNAL MEDICINE CLINIC | Age: 72
End: 2022-12-06
Payer: MEDICARE

## 2022-12-06 VITALS
DIASTOLIC BLOOD PRESSURE: 76 MMHG | HEART RATE: 71 BPM | WEIGHT: 195.8 LBS | OXYGEN SATURATION: 99 % | TEMPERATURE: 97.2 F | BODY MASS INDEX: 31.47 KG/M2 | RESPIRATION RATE: 16 BRPM | HEIGHT: 66 IN | SYSTOLIC BLOOD PRESSURE: 152 MMHG

## 2022-12-06 DIAGNOSIS — Z23 ENCOUNTER FOR IMMUNIZATION: ICD-10-CM

## 2022-12-06 DIAGNOSIS — K20.90 ESOPHAGITIS: ICD-10-CM

## 2022-12-06 DIAGNOSIS — R05.3 CHRONIC COUGH: ICD-10-CM

## 2022-12-06 DIAGNOSIS — E78.00 PURE HYPERCHOLESTEROLEMIA: ICD-10-CM

## 2022-12-06 DIAGNOSIS — I34.0 NONRHEUMATIC MITRAL VALVE REGURGITATION: ICD-10-CM

## 2022-12-06 DIAGNOSIS — I10 ESSENTIAL HYPERTENSION WITH GOAL BLOOD PRESSURE LESS THAN 130/85: ICD-10-CM

## 2022-12-06 DIAGNOSIS — R73.01 IFG (IMPAIRED FASTING GLUCOSE): ICD-10-CM

## 2022-12-06 DIAGNOSIS — Z78.0 POSTMENOPAUSAL STATE: Primary | ICD-10-CM

## 2022-12-06 PROCEDURE — G8753 SYS BP > OR = 140: HCPCS | Performed by: INTERNAL MEDICINE

## 2022-12-06 PROCEDURE — G8399 PT W/DXA RESULTS DOCUMENT: HCPCS | Performed by: INTERNAL MEDICINE

## 2022-12-06 PROCEDURE — 90732 PPSV23 VACC 2 YRS+ SUBQ/IM: CPT | Performed by: INTERNAL MEDICINE

## 2022-12-06 PROCEDURE — 99215 OFFICE O/P EST HI 40 MIN: CPT | Performed by: INTERNAL MEDICINE

## 2022-12-06 PROCEDURE — G8510 SCR DEP NEG, NO PLAN REQD: HCPCS | Performed by: INTERNAL MEDICINE

## 2022-12-06 PROCEDURE — G8417 CALC BMI ABV UP PARAM F/U: HCPCS | Performed by: INTERNAL MEDICINE

## 2022-12-06 PROCEDURE — G9899 SCRN MAM PERF RSLTS DOC: HCPCS | Performed by: INTERNAL MEDICINE

## 2022-12-06 PROCEDURE — 3017F COLORECTAL CA SCREEN DOC REV: CPT | Performed by: INTERNAL MEDICINE

## 2022-12-06 PROCEDURE — G8427 DOCREV CUR MEDS BY ELIG CLIN: HCPCS | Performed by: INTERNAL MEDICINE

## 2022-12-06 PROCEDURE — G8754 DIAS BP LESS 90: HCPCS | Performed by: INTERNAL MEDICINE

## 2022-12-06 PROCEDURE — G0463 HOSPITAL OUTPT CLINIC VISIT: HCPCS | Performed by: INTERNAL MEDICINE

## 2022-12-06 PROCEDURE — 1090F PRES/ABSN URINE INCON ASSESS: CPT | Performed by: INTERNAL MEDICINE

## 2022-12-06 PROCEDURE — G8536 NO DOC ELDER MAL SCRN: HCPCS | Performed by: INTERNAL MEDICINE

## 2022-12-06 PROCEDURE — 1101F PT FALLS ASSESS-DOCD LE1/YR: CPT | Performed by: INTERNAL MEDICINE

## 2022-12-06 RX ORDER — AMLODIPINE BESYLATE 2.5 MG/1
2.5 TABLET ORAL DAILY
Qty: 90 TABLET | Refills: 0 | Status: SHIPPED | OUTPATIENT
Start: 2022-12-06 | End: 2022-12-09

## 2022-12-06 RX ORDER — FLUTICASONE PROPIONATE AND SALMETEROL 250; 50 UG/1; UG/1
1 POWDER RESPIRATORY (INHALATION) 2 TIMES DAILY
Qty: 60 EACH | Refills: 1 | Status: SHIPPED | OUTPATIENT
Start: 2022-12-06

## 2022-12-06 RX ORDER — HYDROCHLOROTHIAZIDE 25 MG/1
25 TABLET ORAL DAILY
Qty: 90 TABLET | Refills: 1 | Status: SHIPPED | OUTPATIENT
Start: 2022-12-06

## 2022-12-06 RX ORDER — LOSARTAN POTASSIUM 100 MG/1
100 TABLET ORAL DAILY
Qty: 90 TABLET | Refills: 1 | Status: SHIPPED | OUTPATIENT
Start: 2022-12-06

## 2022-12-06 RX ORDER — SIMVASTATIN 40 MG/1
40 TABLET, FILM COATED ORAL DAILY
Qty: 90 TABLET | Refills: 1 | Status: SHIPPED | OUTPATIENT
Start: 2022-12-06

## 2022-12-06 RX ORDER — BRIMONIDINE TARTRATE AND TIMOLOL MALEATE 2; 5 MG/ML; MG/ML
SOLUTION OPHTHALMIC
COMMUNITY
Start: 2022-10-04

## 2022-12-06 NOTE — PROGRESS NOTES
1. \"Have you been to the ER, urgent care clinic since your last visit? Hospitalized since your last visit? \" Yes When: 09/01/22 Where: [address] [facility]    2. \"Have you seen or consulted any other health care providers outside of the [address] [place] [address] since your last visit? \" No     3. For patients aged 39-70: Has the patient had a colonoscopy / FIT/ Cologuard? Yes - no Care Gap present      If the patient is female:    4. For patients aged 41-77: Has the patient had a mammogram within the past 2 years? Yes - no Care Gap present      5. For patients aged 21-65: Has the patient had a pap smear?  Yes - no Care Gap present

## 2022-12-06 NOTE — PATIENT INSTRUCTIONS
Medicare Wellness Visit, Female     The best way to live healthy is to have a lifestyle where you eat a well-balanced diet, exercise regularly, limit alcohol use, and quit all forms of tobacco/nicotine, if applicable. Regular preventive services are another way to keep healthy. Preventive services (vaccines, screening tests, monitoring & exams) can help personalize your care plan, which helps you manage your own care. Screening tests can find health problems at the earliest stages, when they are easiest to treat. [facility] follows the current, evidence-based guidelines published by the [facility] ([facility]) when recommending preventive services for our patients. Because we follow these guidelines, sometimes recommendations change over time as research supports it. (For example, mammograms used to be recommended annually. Even though Medicare will still pay for an annual mammogram, the newer guidelines recommend a mammogram every two years for women of average risk). Of course, you and your doctor may decide to screen more often for some diseases, based on your risk and your co-morbidities (chronic disease you are already diagnosed with). Preventive services for you include:  - Medicare offers their members a free annual wellness visit, which is time for you and your primary care provider to discuss and plan for your preventive service needs.  Take advantage of this benefit every year!    -Over the age of 72 should receive the recommended pneumonia vaccines.    -All adults should have a flu vaccine yearly.  -All adults should have a tetanus vaccine every 10 years.   -Over the age 48 should receive the shingles vaccines.        -All adults should be screened once for Hepatitis C.  -All adults age 38-68 who are overweight should have a diabetes screening test once every three years.   -Other screening tests and preventive services for persons with diabetes include: an eye exam to screen for diabetic retinopathy, a kidney function test, a foot exam, and stricter control over your cholesterol.   -Cardiovascular screening for adults with routine risk involves an electrocardiogram (ECG) at intervals determined by your doctor.     -Colorectal cancer screenings should be done for adults age 39-70 with no increased risk factors for colorectal cancer. There are a number of acceptable methods of screening for this type of cancer. Each test has its own benefits and drawbacks. Discuss with your doctor what is most appropriate for you during your annual wellness visit. The different tests include: colonoscopy (considered the best screening method), a fecal occult blood test, a fecal DNA test, and sigmoidoscopy.    -Lung cancer screening is recommended annually with a low dose CT scan for adults between age 54 and 68, who have smoked at least 30 pack years (equivalent of 1 pack per day for 30 days), and who is a current smoker or quit less than 15 years ago.    -A bone mass density test is recommended when a woman turns 65 to screen for osteoporosis. This test is only recommended one time, as a screening. Some providers will use this same test as a disease monitoring tool if you already have osteoporosis. -Breast cancer screenings are recommended every other year for women of normal risk, age 54-69.    -Cervical cancer screenings for women over age 72 are only recommended with certain risk factors.      Here is a list of your current Health Maintenance items (your personalized list of preventive services) with a due date:  Health Maintenance Due   Topic Date Due    COVID-19 Vaccine (4 - Booster for Moderna series) 02/04/2022    Pneumococcal Vaccine (3) 02/06/2022         Start Norvasc 2.5mg daily    Return for 1 week nurse visit for a blood pressure check

## 2022-12-07 DIAGNOSIS — R73.01 IFG (IMPAIRED FASTING GLUCOSE): ICD-10-CM

## 2022-12-07 DIAGNOSIS — I10 ESSENTIAL HYPERTENSION WITH GOAL BLOOD PRESSURE LESS THAN 130/85: Primary | ICD-10-CM

## 2022-12-07 LAB
BUN SERPL-MCNC: 13 MG/DL (ref 8–27)
BUN/CREAT SERPL: 15 (ref 12–28)
CALCIUM SERPL-MCNC: 10.4 MG/DL (ref 8.7–10.3)
CHLORIDE SERPL-SCNC: 102 MMOL/L (ref 96–106)
CO2 SERPL-SCNC: 26 MMOL/L (ref 20–29)
CREAT SERPL-MCNC: 0.87 MG/DL (ref 0.57–1)
EGFR: 72 ML/MIN/1.73
EST. AVERAGE GLUCOSE BLD GHB EST-MCNC: 137 MG/DL
GLUCOSE SERPL-MCNC: 108 MG/DL (ref 70–99)
HBA1C MFR BLD: 6.4 % (ref 4.8–5.6)
POTASSIUM SERPL-SCNC: 4.2 MMOL/L (ref 3.5–5.2)
SODIUM SERPL-SCNC: 142 MMOL/L (ref 134–144)

## 2022-12-07 NOTE — PROGRESS NOTES
Called, spoke to pt  Received two pt identifiers   Pt informed per Dr. Gonzales Palacios Blood sugars reaching Diabetic range---advised to work on weight loss  Pt informed moved appt up to 4 month follow up. Labs to get done prior ordered  Pt informed per Dr. Gonzales Palacios Mild calcium elevation --avoid otc calcium supplements   Pt verbalizes understanding of the instructions and has no further questions at this time.

## 2022-12-07 NOTE — PROGRESS NOTES
Please call patient back about results  Let her know BS creeping up closer to DM, work on wt loss  Move up f/U  to 4 mo, check bmp, a1c intact pth 1 week prior  Mild ca elevation --avoid otc calcium supplements

## 2022-12-12 ENCOUNTER — ANESTHESIA (OUTPATIENT)
Dept: ENDOSCOPY | Age: 72
End: 2022-12-12
Payer: MEDICARE

## 2022-12-12 ENCOUNTER — HOSPITAL ENCOUNTER (OUTPATIENT)
Age: 72
Setting detail: OUTPATIENT SURGERY
Discharge: HOME | End: 2022-12-12
Attending: INTERNAL MEDICINE | Admitting: INTERNAL MEDICINE
Payer: MEDICARE

## 2022-12-12 ENCOUNTER — ANESTHESIA EVENT (OUTPATIENT)
Dept: ENDOSCOPY | Age: 72
End: 2022-12-12
Payer: MEDICARE

## 2022-12-12 VITALS
WEIGHT: 192.5 LBS | SYSTOLIC BLOOD PRESSURE: 141 MMHG | RESPIRATION RATE: 19 BRPM | HEART RATE: 68 BPM | HEIGHT: 66 IN | DIASTOLIC BLOOD PRESSURE: 81 MMHG | TEMPERATURE: 97.5 F | BODY MASS INDEX: 30.94 KG/M2 | OXYGEN SATURATION: 97 %

## 2022-12-12 PROCEDURE — 2709999900 HC NON-CHARGEABLE SUPPLY: Performed by: INTERNAL MEDICINE

## 2022-12-12 PROCEDURE — 76040000019: Performed by: INTERNAL MEDICINE

## 2022-12-12 PROCEDURE — 74011250636 HC RX REV CODE- 250/636: Performed by: INTERNAL MEDICINE

## 2022-12-12 PROCEDURE — 88305 TISSUE EXAM BY PATHOLOGIST: CPT

## 2022-12-12 PROCEDURE — 76060000031 HC ANESTHESIA FIRST 0.5 HR: Performed by: INTERNAL MEDICINE

## 2022-12-12 PROCEDURE — 74011000250 HC RX REV CODE- 250: Performed by: NURSE ANESTHETIST, CERTIFIED REGISTERED

## 2022-12-12 PROCEDURE — 74011250636 HC RX REV CODE- 250/636: Performed by: NURSE ANESTHETIST, CERTIFIED REGISTERED

## 2022-12-12 PROCEDURE — 77030019988 HC FCPS ENDOSC DISP BSC -B: Performed by: INTERNAL MEDICINE

## 2022-12-12 RX ORDER — NALOXONE HYDROCHLORIDE 0.4 MG/ML
0.4 INJECTION, SOLUTION INTRAMUSCULAR; INTRAVENOUS; SUBCUTANEOUS
Status: DISCONTINUED | OUTPATIENT
Start: 2022-12-12 | End: 2022-12-12 | Stop reason: HOSPADM

## 2022-12-12 RX ORDER — DEXTROMETHORPHAN/PSEUDOEPHED 2.5-7.5/.8
1.2 DROPS ORAL
Status: DISCONTINUED | OUTPATIENT
Start: 2022-12-12 | End: 2022-12-12 | Stop reason: HOSPADM

## 2022-12-12 RX ORDER — ONDANSETRON 2 MG/ML
4 INJECTION INTRAMUSCULAR; INTRAVENOUS AS NEEDED
Status: CANCELLED | OUTPATIENT
Start: 2022-12-12

## 2022-12-12 RX ORDER — PROPOFOL 10 MG/ML
INJECTION, EMULSION INTRAVENOUS AS NEEDED
Status: DISCONTINUED | OUTPATIENT
Start: 2022-12-12 | End: 2022-12-12 | Stop reason: HOSPADM

## 2022-12-12 RX ORDER — PANTOPRAZOLE SODIUM 40 MG/1
40 TABLET, DELAYED RELEASE ORAL 2 TIMES DAILY
Qty: 180 TABLET | Refills: 3 | Status: SHIPPED | OUTPATIENT
Start: 2022-12-12

## 2022-12-12 RX ORDER — ATROPINE SULFATE 0.1 MG/ML
0.5 INJECTION INTRAVENOUS
Status: DISCONTINUED | OUTPATIENT
Start: 2022-12-12 | End: 2022-12-12 | Stop reason: HOSPADM

## 2022-12-12 RX ORDER — GLYCOPYRROLATE 0.2 MG/ML
INJECTION INTRAMUSCULAR; INTRAVENOUS AS NEEDED
Status: DISCONTINUED | OUTPATIENT
Start: 2022-12-12 | End: 2022-12-12 | Stop reason: HOSPADM

## 2022-12-12 RX ORDER — FLUMAZENIL 0.1 MG/ML
0.2 INJECTION INTRAVENOUS
Status: DISCONTINUED | OUTPATIENT
Start: 2022-12-12 | End: 2022-12-12 | Stop reason: HOSPADM

## 2022-12-12 RX ORDER — SODIUM CHLORIDE 9 MG/ML
75 INJECTION, SOLUTION INTRAVENOUS CONTINUOUS
Status: DISCONTINUED | OUTPATIENT
Start: 2022-12-12 | End: 2022-12-12 | Stop reason: HOSPADM

## 2022-12-12 RX ORDER — MIDAZOLAM HYDROCHLORIDE 1 MG/ML
.25-5 INJECTION, SOLUTION INTRAMUSCULAR; INTRAVENOUS
Status: DISCONTINUED | OUTPATIENT
Start: 2022-12-12 | End: 2022-12-12 | Stop reason: HOSPADM

## 2022-12-12 RX ORDER — SODIUM CHLORIDE 0.9 % (FLUSH) 0.9 %
5-40 SYRINGE (ML) INJECTION EVERY 8 HOURS
Status: DISCONTINUED | OUTPATIENT
Start: 2022-12-12 | End: 2022-12-12 | Stop reason: HOSPADM

## 2022-12-12 RX ORDER — EPINEPHRINE 0.1 MG/ML
1 INJECTION INTRACARDIAC; INTRAVENOUS
Status: DISCONTINUED | OUTPATIENT
Start: 2022-12-12 | End: 2022-12-12 | Stop reason: HOSPADM

## 2022-12-12 RX ORDER — SODIUM CHLORIDE 0.9 % (FLUSH) 0.9 %
5-40 SYRINGE (ML) INJECTION AS NEEDED
Status: DISCONTINUED | OUTPATIENT
Start: 2022-12-12 | End: 2022-12-12 | Stop reason: HOSPADM

## 2022-12-12 RX ORDER — LIDOCAINE HYDROCHLORIDE 20 MG/ML
INJECTION, SOLUTION EPIDURAL; INFILTRATION; INTRACAUDAL; PERINEURAL AS NEEDED
Status: DISCONTINUED | OUTPATIENT
Start: 2022-12-12 | End: 2022-12-12 | Stop reason: HOSPADM

## 2022-12-12 RX ORDER — SODIUM CHLORIDE 9 MG/ML
INJECTION, SOLUTION INTRAVENOUS
Status: DISCONTINUED | OUTPATIENT
Start: 2022-12-12 | End: 2022-12-12 | Stop reason: HOSPADM

## 2022-12-12 RX ADMIN — LIDOCAINE HYDROCHLORIDE 100 MG: 20 INJECTION, SOLUTION EPIDURAL; INFILTRATION; INTRACAUDAL; PERINEURAL at 10:00

## 2022-12-12 RX ADMIN — GLYCOPYRROLATE 0.2 MG: 0.2 INJECTION, SOLUTION INTRAMUSCULAR; INTRAVENOUS at 10:00

## 2022-12-12 RX ADMIN — SODIUM CHLORIDE 75 ML/HR: 0.9 INJECTION, SOLUTION INTRAVENOUS at 10:00

## 2022-12-12 RX ADMIN — SODIUM CHLORIDE: 0.9 INJECTION, SOLUTION INTRAVENOUS at 09:56

## 2022-12-12 RX ADMIN — PROPOFOL 50 MG: 10 INJECTION, EMULSION INTRAVENOUS at 10:00

## 2022-12-12 NOTE — ANESTHESIA PREPROCEDURE EVALUATION
Anesthetic History   No history of anesthetic complications            Review of Systems / Medical History  Patient summary reviewed, nursing notes reviewed and pertinent labs reviewed    Pulmonary  Within defined limits              Comments: Environmental and seasonal allergies   Neuro/Psych         Headaches (Migraine)     Cardiovascular    Hypertension          Hyperlipidemia    Exercise tolerance: >4 METS  Comments:  EKG:  SR     ECHO Stress NEGATIVE     ECHO:  55-60% EF, mild TR and MR   GI/Hepatic/Renal     GERD          Comments: Abnormal imaging of digestive tract, benign ruslan stomach, change in bowel habits, noncardiac chest pain, constipation, diarrhea, duodenitis, gastritis, GERD, H. Pylori pos treated x 2, hemorrhoids, melena, nausea, nonspec abdominal pain Endo/Other        Arthritis     Other Findings   Comments: Vertigo  Glaucoma  Osteopenia           Physical Exam    Airway  Mallampati: II  TM Distance: 4 - 6 cm  Neck ROM: normal range of motion   Mouth opening: Normal     Cardiovascular  Regular rate and rhythm,  S1 and S2 normal,  no murmur, click, rub, or gallop             Dental  No notable dental hx       Pulmonary  Breath sounds clear to auscultation               Abdominal  GI exam deferred       Other Findings            Anesthetic Plan    ASA: 2  Anesthesia type: MAC          Induction: Intravenous  Anesthetic plan and risks discussed with: Patient

## 2022-12-12 NOTE — H&P
Pre-endoscopy H and P    The patient was seen and examined in the room/pre-op holding area. The airway was assessed and documented. The problem list, past medical history, and medications were reviewed. Patient Active Problem List   Diagnosis Code    Esophagitis K20.90    Headache(784.0) R51    Constipation K59.00    Glaucoma H40.9    Vertigo R42    Osteopenia M85.80    Essential hypertension with goal blood pressure less than 130/85 I10    Pure hypercholesterolemia E78.00    Nonrheumatic mitral valve regurgitation I34.0     Social History     Socioeconomic History    Marital status:      Spouse name: Not on file    Number of children: Not on file    Years of education: Not on file    Highest education level: Not on file   Occupational History    Not on file   Tobacco Use    Smoking status: Never    Smokeless tobacco: Never   Vaping Use    Vaping Use: Never used   Substance and Sexual Activity    Alcohol use: No    Drug use: No    Sexual activity: Not on file   Other Topics Concern    Not on file   Social History Narrative    Not on file     Social Determinants of Health     Financial Resource Strain: Low Risk     Difficulty of Paying Living Expenses: Not very hard   Food Insecurity: No Food Insecurity    Worried About Running Out of Food in the Last Year: Never true    Ran Out of Food in the Last Year: Never true   Transportation Needs: Not on file   Physical Activity: Not on file   Stress: Not on file   Social Connections: Not on file   Intimate Partner Violence: Not on file   Housing Stability: Not on file     Past Medical History:   Diagnosis Date    Arthritis     narrowing of spine    GERD - Esophagitis - reflux     Headache(784.0)     Hemorrhoids     Hx of vertigo     Hypercholesterolemia     Hypertension     Mitral valve regurgitation     Ulcer          Prior to Admission Medications   Prescriptions Last Dose Informant Patient Reported?  Taking?   acetaminophen (TYLENOL) 650 mg TbER 12/11/2022 Yes Yes   Sig: Take 650 mg by mouth every eight (8) hours. brimonidine-timoloL (COMBIGAN) 0.2-0.5 % drop ophthalmic solution 12/11/2022  Yes Yes   Sig: INSTILL 1 DROP IN BOTH EYES TWICE DAILY   cholecalciferol (VITAMIN D3) (1000 Units /25 mcg) tablet 12/5/2022  Yes No   Sig: Take 1,000 Units by mouth daily. fluticasone propion-salmeteroL (ADVAIR/WIXELA) 250-50 mcg/dose diskus inhaler Not Taking  No No   Sig: Take 1 Puff by inhalation two (2) times a day. Patient not taking: Reported on 12/9/2022   hydroCHLOROthiazide (HYDRODIURIL) 25 mg tablet 12/11/2022  No Yes   Sig: Take 1 Tablet by mouth daily. losartan (COZAAR) 100 mg tablet 12/11/2022  No Yes   Sig: Take 1 Tablet by mouth daily. pantoprazole (PROTONIX) 40 mg tablet 12/9/2022  No Yes   Sig: TAKE 1 TABLET BY MOUTH ONCE DAILY. polyethylene glycol (MIRALAX) 17 gram packet Unknown  Yes No   Sig: Take 17 g by mouth as needed. simvastatin (ZOCOR) 40 mg tablet 12/11/2022  No Yes   Sig: Take 1 Tablet by mouth daily. Facility-Administered Medications: None           The review of systems is:  Negative  for shortness of breath or chest pain      The heart, lungs, and mental status were satisfactory for the administration of deep sedation and for the procedure. I discussed with the patient the objectives, risks, consequences and alternatives to the procedure.       Hayley Herzog MD  12/12/2022  10:01 AM

## 2022-12-12 NOTE — PROCEDURES
[place] Office: [phone]      Esophagogastroduodenoscopy Procedure Note      Manual Jaimee  1950  [id number]    Indication:  Nausea; abnormal CT abdomen/pelvis      : Tari Warren MD    Referring Provider:  Ovi Lindo MD    Sedation:  MAC anesthesia Propofol    Procedure Details:  After detailed informed consent was obtained for the procedure, with all risks and benefits of procedure explained the patient was taken to the endoscopy suite and placed in the left lateral decubitus position. Following sequential administration of sedation as per above, the endoscope was inserted into the mouth and advanced under direct vision to second portion of the duodenum. A careful inspection was made as the gastroscope was withdrawn, including a retroflexed view of the proximal stomach; findings and interventions are described below. Findings:     Esophagus: The esophageal mucosa in the proximal, mid and distal esophagus is normal.   The squamo-columnar junction is at 40 cm where the Z-line was noted. Small  hiatal hernia noted. Stomach:   Antrum has a few erosions and gastropathy: multiple biopsies taken  The fundus was found to be normal with no lesions noted on retroflexion. Duodenum:   The bulbar mucosa is normal in appearance. I could not enter D2 as she pulled her IV and case had to be expedited. Therapies:  biopsy of stomach antrum    Specimen:  Specimens were collected as described and send to the laboratory. Complications:   None were encountered during the procedure. EBL:  None. Recommendations:     -Continue acid suppression. ,  -Await biopsies. -GERD diet: avoid fried and fatty foods. peppermint, chocolate, alcohol, coffee, citrus fruits and juices, tomoato products; avoid lying down for 2 to 3 hours after eating.,  -No NSAIDS    S.Andrew Moran MD  12/12/2022  10:09 AM

## 2022-12-12 NOTE — ANESTHESIA POSTPROCEDURE EVALUATION
Procedure(s):  ESOPHAGOGASTRODUODENOSCOPY (EGD)  ESOPHAGOGASTRODUODENAL (EGD) BIOPSY.     MAC    Anesthesia Post Evaluation      Multimodal analgesia: multimodal analgesia used between 6 hours prior to anesthesia start to PACU discharge  Patient location during evaluation: PACU  Patient participation: complete - patient participated  Level of consciousness: sleepy but conscious  Pain management: adequate  Airway patency: patent  Anesthetic complications: no  Cardiovascular status: acceptable, blood pressure returned to baseline and hemodynamically stable  Respiratory status: acceptable and nasal cannula  Hydration status: acceptable  Post anesthesia nausea and vomiting:  none  Final Post Anesthesia Temperature Assessment:  Normothermia (36.0-37.5 degrees C)      INITIAL Post-op Vital signs:   Vitals Value Taken Time   /81 12/12/22 1036   Temp 36.4 °C (97.5 °F) 12/12/22 1024   Pulse 68 12/12/22 1036   Resp 19 12/12/22 1036   SpO2 97 % 12/12/22 1036

## 2022-12-12 NOTE — DISCHARGE INSTRUCTIONS
[place] Office: [phone]    Eric Brooks  [id number]  1950    EGD/COLONOSCOPY DISCHARGE INSTRUCTIONS  Discomfort:  Sore throat- throat lozenges or warm salt water gargle  redness at IV site- apply warm compress to area; if redness or soreness persist- contact your physician  Gaseous discomfort- walking, belching will help relieve any discomfort  You may not operate a vehicle for 12 hours  You may not engage in an occupation involving machinery or appliances for rest of today. You may not drink alcoholic beverages for at least 12 hours  Avoid making any critical decisions for at least 24 hour  DIET  You may resume your regular diet - however -  remember your colon is empty and a heavy meal will produce gas. Avoid these foods:  fried / greasy foods, excessive carbonated drinks or too much caffeine  MEDICATIONS   Regarding Aspirin or Nonsteroidal medications specifically, please see below. ACTIVITY  You may resume your normal daily activities. Spend the remainder of the day resting -  avoid any strenuous activity. CALL M.D. ANY SIGN OF   Increasing pain, nausea, vomiting  Abdominal distension (swelling)  New increased bleeding (oral or rectal)  Fever (chills)  Pain in chest area  Bloody discharge from nose or mouth  Shortness of breath    You may not take any Advil, Aspirin, Ibuprofen, Motrin, Aleve, or Goodys for 7 days, ONLY  Tylenol as needed for pain. Follow-up Instructions:   Call  Andrew Smith MD for any questions or concerns  Results of procedure / biopsy in 7 days   Telephone # [phone]      Patient Education on Sedation / Analgesia Administered for Procedure      For 24 hours after general anesthesia or intravenous analgesia / sedation:  Have someone responsible help you with your care  Limit your activities  Do not drive and operate hazardous machinery  Do not make important personal, legal or business decisions  Do not drink alcoholic beverages  If you have not urinated within 8 hours after discharge, please contact your physician  Resume your medications unless otherwise instructed    For 24 hours after general anesthesia or intravenous analgesia / sedation  you may experience:  Drowsiness, dizziness, sleepiness, or confusion  Difficulty remembering or delayed reaction times  Difficulty with your balance, especially while walking, move slowly and carefully, do not make sudden position changes  Difficulty focusing or blurred vision    You may not be aware of slight changes in your behavior and/or your reaction time because of the medication used during and after your procedure.     Report the following to your physician:  Excessive pain, swelling, redness or odor of or around the surgical area  Temperature over 100.5  Nausea and vomiting lasting longer than 4 hours or if unable to take medications  Any signs of decreased circulation or nerve impairment to extremity: change in color, persistent numbness, tingling, coldness or increase pain  Any questions or concerns    IF YOU REPORT TO AN EMERGENCY ROOM, DOCTOR'S OFFICE OR HOSPITAL WITHIN 24 HOURS AFTER YOUR PROCEDURE, BRING THIS SHEET AND YOUR AFTER VISIT SUMMARY WITH YOU AND GIVE IT TO THE PHYSICIAN OR NURSE ATTENDING YOU.

## 2022-12-12 NOTE — ROUTINE PROCESS
Jarocho Flores  1950  [id number]    Situation:  Verbal report received from: JOSE Amezquita RN  Procedure: Procedure(s):  ESOPHAGOGASTRODUODENOSCOPY (EGD)  ESOPHAGOGASTRODUODENAL (EGD) BIOPSY    Background:    Preoperative diagnosis: Abnormal findings on diagnostic imaging of digestive system [R93.3]  Change in bowel habits [R19.4]  Nausea [R11.0]  Postoperative diagnosis: egd - gastrits, gastric erosions     :  Dr. Tank Cunningham  Assistant(s): Endoscopy Technician-1: Carmela Melton  Endoscopy RN-1: Sarika Amezquita RN    Specimens:   ID Type Source Tests Collected by Time Destination   1 : Bx Preservative Stomach, Antrum  Harriet Gibson MD 12/12/2022 1012 Pathology     H. Pylori  no    Assessment:  Intra-procedure medications     Anesthesia gave intra-procedure sedation and medications, see anesthesia flow sheet yes    Intravenous fluids: NS@ KVO     Vital signs stable   yes    Abdominal assessment: round and soft   yes    Recommendation:  Discharge patient per MD order  yes.     Family or Ofilia Old,   Permission to share finding with family or friend yes

## 2022-12-23 ENCOUNTER — CLINICAL SUPPORT (OUTPATIENT)
Dept: INTERNAL MEDICINE CLINIC | Age: 72
End: 2022-12-23

## 2022-12-23 VITALS — HEART RATE: 70 BPM | SYSTOLIC BLOOD PRESSURE: 135 MMHG | DIASTOLIC BLOOD PRESSURE: 73 MMHG

## 2022-12-23 DIAGNOSIS — Z01.30 BP CHECK: Primary | ICD-10-CM

## 2022-12-23 NOTE — PROGRESS NOTES
Pt presents in clinic for BP check per Dr. Adolfo Sylvester. BP taken--see VS.  Visit Vitals  /73 (BP 1 Location: Left upper arm, BP Patient Position: Sitting)   Pulse 70       Pt informed Dr. Adolfo Sylvester will be notified. Pt informed if Dr. Adolfo Sylvester makes any adjustments, pt will be contacted. Pt verbalized understanding of information discussed w/ no further questions at this time.

## 2023-02-03 ENCOUNTER — TELEPHONE (OUTPATIENT)
Dept: INTERNAL MEDICINE CLINIC | Age: 73
End: 2023-02-03

## 2023-02-03 NOTE — TELEPHONE ENCOUNTER
#[phone]  pt states that she started a new b/p medication in August.     The past two weeks her pressure has been lower.   Pt would like a call to let her know if this is normal.    110/55, 105/64, 114/59

## 2023-02-03 NOTE — TELEPHONE ENCOUNTER
Called, spoke to pt  Received two pt identifiers  Informed pt the BP is good and to keep taking the same meds  Advised pt keep checking and let us know if BP drops below 100  Pt verbalizes understanding of the instructions and has no further questions at this time.

## 2023-02-20 DIAGNOSIS — I10 ESSENTIAL HYPERTENSION WITH GOAL BLOOD PRESSURE LESS THAN 130/85: Primary | ICD-10-CM

## 2023-02-20 RX ORDER — AMLODIPINE BESYLATE 2.5 MG/1
2.5 TABLET ORAL DAILY
Qty: 90 TABLET | Refills: 0 | Status: SHIPPED | OUTPATIENT
Start: 2023-02-20

## 2023-02-20 NOTE — TELEPHONE ENCOUNTER
PCP: Anthony Nino MD    Last appt: 12/23/2022  Future Appointments   Date Time Provider Chanda Machado   2/28/2023  1:20 PM [facility] ARMANDO 1 [address] RLMAMMO LABURNUM IM   2/28/2023  2:00 PM [facility] DEXA 1 [address] RLDEXA LABURNUM IM   3/7/2023  1:45 PM Anthony Nino MD [facility] BS AMB   4/17/2023 11:00 AM Anthony Nino MD [facility] BS AMB   8/28/2023  1:00 PM Roseline Mishra MD [facility] BS AMB       Requested Prescriptions     Pending Prescriptions Disp Refills    amLODIPine (NORVASC) 2.5 mg tablet 90 Tablet 0     Sig: Take 1 Tablet by mouth daily.

## 2023-02-28 ENCOUNTER — HOSPITAL ENCOUNTER (OUTPATIENT)
Dept: MAMMOGRAPHY | Age: 73
Discharge: HOME | End: 2023-02-28
Attending: INTERNAL MEDICINE
Payer: MEDICARE

## 2023-02-28 ENCOUNTER — TELEPHONE (OUTPATIENT)
Dept: INTERNAL MEDICINE CLINIC | Age: 73
End: 2023-02-28

## 2023-02-28 ENCOUNTER — HOSPITAL ENCOUNTER (OUTPATIENT)
Dept: BONE DENSITY | Age: 73
Discharge: HOME | End: 2023-02-28
Attending: INTERNAL MEDICINE
Payer: MEDICARE

## 2023-02-28 DIAGNOSIS — Z78.0 POSTMENOPAUSAL STATE: ICD-10-CM

## 2023-02-28 DIAGNOSIS — Z12.31 SCREENING MAMMOGRAM FOR HIGH-RISK PATIENT: ICD-10-CM

## 2023-02-28 PROCEDURE — 77063 BREAST TOMOSYNTHESIS BI: CPT

## 2023-02-28 PROCEDURE — 77080 DXA BONE DENSITY AXIAL: CPT

## 2023-02-28 NOTE — TELEPHONE ENCOUNTER
----- Message from Jessica Evans sent at 2/28/2023  1:07 PM EST -----  Subject: Message to Provider    QUESTIONS  Information for Provider? pt would like to know if she needs to have blood   work for her 3/7/2023 follow up appt?  ---------------------------------------------------------------------------  --------------  [id number] I[address]  [phone]; OK to leave message on voicemail  ---------------------------------------------------------------------------  --------------  SCRIPT ANSWERS  Relationship to Patient?  Self

## 2023-02-28 NOTE — TELEPHONE ENCOUNTER
Called, spoke to pt  Received two pt identifiers  pt informed labs need to be done prior to appt and already in  Pt verbalizes understanding of the instructions and has no further questions at this time.

## 2023-03-02 ENCOUNTER — APPOINTMENT (OUTPATIENT)
Dept: INTERNAL MEDICINE CLINIC | Age: 73
End: 2023-03-02

## 2023-03-02 DIAGNOSIS — R73.01 IFG (IMPAIRED FASTING GLUCOSE): ICD-10-CM

## 2023-03-02 DIAGNOSIS — I10 ESSENTIAL HYPERTENSION WITH GOAL BLOOD PRESSURE LESS THAN 130/85: ICD-10-CM

## 2023-03-03 LAB
ANION GAP SERPL CALC-SCNC: 8 MMOL/L (ref 5–15)
BUN SERPL-MCNC: 15 MG/DL (ref 6–20)
BUN/CREAT SERPL: 15 (ref 12–20)
CALCIUM SERPL-MCNC: 10 MG/DL (ref 8.5–10.1)
CALCIUM SERPL-MCNC: 10 MG/DL (ref 8.5–10.1)
CHLORIDE SERPL-SCNC: 101 MMOL/L (ref 97–108)
CO2 SERPL-SCNC: 27 MMOL/L (ref 21–32)
CREAT SERPL-MCNC: 0.99 MG/DL (ref 0.55–1.02)
EST. AVERAGE GLUCOSE BLD GHB EST-MCNC: 123 MG/DL
GLUCOSE SERPL-MCNC: 101 MG/DL (ref 65–100)
HBA1C MFR BLD: 5.9 % (ref 4–5.6)
POTASSIUM SERPL-SCNC: 3.9 MMOL/L (ref 3.5–5.1)
PTH-INTACT SERPL-MCNC: 48.8 PG/ML (ref 18.4–88)
SODIUM SERPL-SCNC: 136 MMOL/L (ref 136–145)

## 2023-03-06 NOTE — PROGRESS NOTES
HISTORY OF PRESENT ILLNESS  Tiffanie Arias is a 81 y.o. female. HPI  Last here 12/6/22. Pt is here for routine care. She is scheduled for glaucoma and cataract surgery 3/16/23 with Dr. Prabhakar Butler. She denies latex allergy. Pt denies cp, sob, palpitations, orthopnea, claudication, PND, and new swelling in legs  Pt can sleep laying flat  Pt can walk up a set of stairs  Pt can walk around the mall   Pt can vacuum and do laundry     Functional mets >>4    Last EKG 8/28/22 was nsr     Has a history of hypertension  BP today is 133/77  BP at home 126/68 123/64 129/68 134/68 117/67 113/65 107/66   Will check her BP cuff for accuracy   Pt is on losartan 100 mg, hctz 25 mg, norvasc 2.5 mg (started lov)  Recall pt had a cough on lisinopril. She was taking this medication for 20 years. Wt today is 191 lbs, down 4 lbs since lov  Discussed diet and wt/l  She has been walking 3 miles/day     Reviewed labs   Ordered labs     She is now following with Dr. Latesha Suárez (ortho) for BL knee pain   Lov 3/18/22  She had injections in both knee this helped     Pt follows with Dr. Cam Garcia (GI) for nausea and hx diarrhea  Pt is taking protonix  BID   Previously took amitiza prn but no longer on this, had hemorrhoid surgery. Lov CT abd pelv 7/1/22, abnormal  Reviewed EGD 12/12/22, showed HH:  Recommendations:   -Continue acid suppression. ,  -Await biopsies. -GERD diet: avoid fried and fatty foods. peppermint, chocolate, alcohol, coffee, citrus fruits and juices, tomoato products; avoid lying down for 2 to 3 hours after eating.,  -No NSAIDS      Pt sees Dr. Trent Khan (cardio) for mitral valve regurgitation  Last there 8/22/22  Will f/U annually     Recall echo 8/22/22:    Left Ventricle: Normal left ventricular systolic function with a visually estimated EF of 60 - 65%. Left ventricle size is normal. Normal wall thickness. Normal wall motion. Normal diastolic function. Mitral Valve: Mild regurgitation.       Recall stress echo 21:  Baseline ECG: Normal sinus rhythm. Stress test: Negative stress test.  Echo: Normal stress echocardiogram.       Pt has a hx of chronic recurrent cough x 2017  She has seen Dr. Rodo Reid (ENT) about this in 2019 and is currently following with GI (see above)  She takes zyrtec in AM, Claritin at night, and flonase which improved her symptoms in the past  She has advair which she uses PRN  We have also tried a variety of different inhalers in the past which have helped, including Incruse and Breo at 1 point   Lov I ordered CXR, but this was not completed she felt that the triggers were allergens in her house when she is traveling and not around her home she has no difficulty with cough ultimately symptoms overall improved     Continues on zocor 40mg daily for cholesterol      Has xanax to use prn - has not used this at all in a long time     She was previously given oral estrogen from gyn for hot flashes, not longer taking this       Pt lives with her       ACP not on file. SDM is her  Marisa Wells). Provided this info in the past    Reviewed mammo 23:    Impression:     BI-RADS 1: Negative. No mammographic evidence of malignancy.     Reviewed DEXA 23: osteopenia     PREVENTIVE:  Colonoscopy:  with Dr. Marga Pulliam, 10 yr f/u  EGD: 22 with Dr. Marga Pulliam, [facility]   AAA: [facility]  Pap: Dr. Rafael Lesch, 3/21  Mammogram: 23 neg  DEXA: 23 osteopenia  Tdap:  declines due to cost  Pneumovax: 22  [id number]: 18   Shingles: Can get at local pharmacy  Flu shot: declines    A1c:  5.8  6.2  6.4 3/23 5.9  Eye exam: Dr. Alejandro Hernandez, , will be having surgery 3/23  Hep C screen:  negative  Lipids:  LDL 76  EK/3/17, nsr with cardio  Covid: three doses (moderna), discussed can get bivalent booster at local pharmacy       Patient Active Problem List    Diagnosis Date Noted    Nonrheumatic mitral valve regurgitation 2021    Essential hypertension with goal blood pressure less than 130/85 08/03/2016    Pure hypercholesterolemia 08/03/2016    Osteopenia 03/23/2012    Constipation 08/05/2011    Glaucoma 08/05/2011    Vertigo 08/05/2011    Headache(784.0) 06/27/2011    Esophagitis 03/22/2011     Current Outpatient Medications   Medication Sig Dispense Refill    amLODIPine (NORVASC) 2.5 mg tablet Take 1 Tablet by mouth daily. 90 Tablet 0    pantoprazole (PROTONIX) 40 mg tablet Take 1 Tablet by mouth two (2) times a day. 180 Tablet 3    brimonidine-timoloL (COMBIGAN) 0.2-0.5 % drop ophthalmic solution INSTILL 1 DROP IN BOTH EYES TWICE DAILY      hydroCHLOROthiazide (HYDRODIURIL) 25 mg tablet Take 1 Tablet by mouth daily. 90 Tablet 1    losartan (COZAAR) 100 mg tablet Take 1 Tablet by mouth daily. 90 Tablet 1    simvastatin (ZOCOR) 40 mg tablet Take 1 Tablet by mouth daily. 90 Tablet 1    fluticasone propion-salmeteroL (ADVAIR/WIXELA) 250-50 mcg/dose diskus inhaler Take 1 Puff by inhalation two (2) times a day. (Patient not taking: Reported on 12/9/2022) 60 Each 1    cholecalciferol (VITAMIN D3) (1000 Units /25 mcg) tablet Take 1,000 Units by mouth daily. acetaminophen (TYLENOL) 650 mg TbER Take 650 mg by mouth every eight (8) hours. polyethylene glycol (MIRALAX) 17 gram packet Take 17 g by mouth as needed.        Past Surgical History:   Procedure Laterality Date    COLONOSCOPY N/A 02/28/2018    COLONOSCOPY performed by Mckenzie Alonso MD at [facility] ENDOSCOPY    HX BREAST BIOPSY Left 1980s    benign cyst    HX HEMORRHOIDECTOMY      HX HYSTERECTOMY      VA UNLISTED PROCEDURE BREAST        Lab Results   Component Value Date/Time    WBC 4.2 09/01/2022 08:00 AM    HGB 12.1 09/01/2022 08:00 AM    HCT 35.6 09/01/2022 08:00 AM    PLATELET 465 66/37/8715 08:00 AM    MCV 90.6 09/01/2022 08:00 AM     Lab Results   Component Value Date/Time    Cholesterol, total 151 06/17/2022 01:46 PM    HDL Cholesterol 60 06/17/2022 01:46 PM    LDL, calculated 76 06/17/2022 01:46 PM    LDL, calculated 74.8 06/15/2021 09:58 AM    Triglyceride 78 06/17/2022 01:46 PM    CHOL/HDL Ratio 2.3 06/15/2021 09:58 AM     Lab Results   Component Value Date/Time    GFR est non-AA 56 (L) 09/01/2022 08:00 AM    GFR est AA >60 09/01/2022 08:00 AM    Creatinine 0.99 03/02/2023 11:05 AM    BUN 15 03/02/2023 11:05 AM    Sodium 136 03/02/2023 11:05 AM    Potassium 3.9 03/02/2023 11:05 AM    Chloride 101 03/02/2023 11:05 AM    CO2 27 03/02/2023 11:05 AM    Magnesium 1.9 09/03/2014 10:55 AM    PTH, Intact 48.8 03/02/2023 11:05 AM        Review of Systems   Respiratory:  Negative for shortness of breath. Cardiovascular:  Negative for chest pain. Physical Exam  Constitutional:       General: She is not in acute distress. Appearance: She is not ill-appearing, toxic-appearing or diaphoretic. HENT:      Head: Normocephalic and atraumatic. Right Ear: External ear normal.      Left Ear: External ear normal.   Eyes:      General:         Right eye: No discharge. Left eye: No discharge. Conjunctiva/sclera: Conjunctivae normal.      Pupils: Pupils are equal, round, and reactive to light. Neck:      Vascular: No carotid bruit. Cardiovascular:      Rate and Rhythm: Normal rate and regular rhythm. Heart sounds: Murmur (slight) heard. No friction rub. No gallop. Pulmonary:      Effort: No respiratory distress. Breath sounds: Normal breath sounds. No wheezing or rales. Chest:      Chest wall: No tenderness. Musculoskeletal:         General: Normal range of motion. Cervical back: Normal.      Right lower leg: No edema. Left lower leg: No edema. Skin:     General: Skin is warm and dry. Neurological:      Mental Status: She is oriented to person, place, and time. Mental status is at baseline.       Coordination: Coordination normal.      Gait: Gait normal.   Psychiatric:         Mood and Affect: Mood normal.         Behavior: Behavior normal. ASSESSMENT and PLAN    ICD-10-CM ICD-9-CM    1. Preop cardiovascular exam  Z01.810 V72.81 CBC W/O DIFF      TSH 3RD GENERATION      HEMOGLOBIN A1C WITH EAG   Patient is low risk for a low risk surgery with good functional METS    EKG was completed in August with cardiology clinic reviewed    She has no active signs or symptoms of CAD or CHF    She is optimally medically managed she may proceed to surgery without additional cardiac work-up   METABOLIC PANEL, COMPREHENSIVE      LIPID PANEL      2. Essential hypertension with goal blood pressure less than 130/85  I10 401.9 CBC W/O DIFF      TSH 3RD GENERATION      HEMOGLOBIN A1C WITH EAG   Controlled on losartan and hydrochlorothiazide and Norvasc continue no change to dose   METABOLIC PANEL, COMPREHENSIVE      LIPID PANEL      3. Pure hypercholesterolemia  E78.00 272.0 CBC W/O DIFF      TSH 3RD GENERATION      HEMOGLOBIN A1C WITH EAG   Controlled with Zocor we will repeat lipids prior to follow-up   METABOLIC PANEL, COMPREHENSIVE      LIPID PANEL      4. Osteopenia of multiple sites  M85.89 733.90 CBC W/O DIFF      TSH 3RD GENERATION      HEMOGLOBIN A1C WITH EAG   Bone density just completed up-to-date vitamin D for treatment   METABOLIC PANEL, COMPREHENSIVE      LIPID PANEL      5. Gastroesophageal reflux disease without esophagitis  K21.9 530.81 CBC W/O DIFF      TSH 3RD GENERATION      HEMOGLOBIN A1C WITH EAG   Recent endoscopy improved with Protonix twice daily   METABOLIC PANEL, COMPREHENSIVE      LIPID PANEL      6. Chronic cough  R05.3 786.2 CBC W/O DIFF      TSH 3RD GENERATION      HEMOGLOBIN A1C WITH EAG   Allergen induced uses Advair as needed continues with Zyrtec and Flonase   METABOLIC PANEL, COMPREHENSIVE      LIPID PANEL      7. Nonrheumatic mitral valve regurgitation  I34.0 424.0 CBC W/O DIFF      TSH 3RD GENERATION      HEMOGLOBIN A1C WITH EAG   Positive cardiology annually stable   METABOLIC PANEL, COMPREHENSIVE      LIPID PANEL      8.  IFG (impaired fasting glucose)  R73.01 790.21 CBC W/O DIFF      TSH 3RD GENERATION      HEMOGLOBIN A1C WITH EAG   Monitor A1c improved on recent labs have been climbing closer to diabetes but now better working on diet no additional changes today repeat A1c prior to follow-up   METABOLIC PANEL, COMPREHENSIVE      LIPID PANEL        Depression screen reviewed and negative. Scribed by Luz Nicolas, as dictated by Dr. Eusebio Cadena. Current diagnosis and concerns discussed with pt at length. Pt understands risks and benefits or current treatment plan and medications, and accepts the treatment and medication with any possible risks. Pt asks appropriate questions, which were answered. Pt was instructed to call with any concerns or problems. I have reviewed the note documented by the scribe. The services provided are my own. The documentation is accurate.

## 2023-03-07 ENCOUNTER — OFFICE VISIT (OUTPATIENT)
Dept: INTERNAL MEDICINE CLINIC | Age: 73
End: 2023-03-07
Payer: MEDICARE

## 2023-03-07 VITALS
WEIGHT: 191 LBS | OXYGEN SATURATION: 100 % | TEMPERATURE: 97.7 F | HEIGHT: 66 IN | BODY MASS INDEX: 30.7 KG/M2 | DIASTOLIC BLOOD PRESSURE: 77 MMHG | RESPIRATION RATE: 18 BRPM | HEART RATE: 85 BPM | SYSTOLIC BLOOD PRESSURE: 133 MMHG

## 2023-03-07 DIAGNOSIS — R73.01 IFG (IMPAIRED FASTING GLUCOSE): ICD-10-CM

## 2023-03-07 DIAGNOSIS — E78.00 PURE HYPERCHOLESTEROLEMIA: ICD-10-CM

## 2023-03-07 DIAGNOSIS — R05.3 CHRONIC COUGH: ICD-10-CM

## 2023-03-07 DIAGNOSIS — Z01.810 PREOP CARDIOVASCULAR EXAM: Primary | ICD-10-CM

## 2023-03-07 DIAGNOSIS — I34.0 NONRHEUMATIC MITRAL VALVE REGURGITATION: ICD-10-CM

## 2023-03-07 DIAGNOSIS — K21.9 GASTROESOPHAGEAL REFLUX DISEASE WITHOUT ESOPHAGITIS: ICD-10-CM

## 2023-03-07 DIAGNOSIS — I10 ESSENTIAL HYPERTENSION WITH GOAL BLOOD PRESSURE LESS THAN 130/85: ICD-10-CM

## 2023-03-07 DIAGNOSIS — M85.89 OSTEOPENIA OF MULTIPLE SITES: ICD-10-CM

## 2023-03-07 PROCEDURE — G8399 PT W/DXA RESULTS DOCUMENT: HCPCS | Performed by: INTERNAL MEDICINE

## 2023-03-07 PROCEDURE — G8510 SCR DEP NEG, NO PLAN REQD: HCPCS | Performed by: INTERNAL MEDICINE

## 2023-03-07 PROCEDURE — 1101F PT FALLS ASSESS-DOCD LE1/YR: CPT | Performed by: INTERNAL MEDICINE

## 2023-03-07 PROCEDURE — G9899 SCRN MAM PERF RSLTS DOC: HCPCS | Performed by: INTERNAL MEDICINE

## 2023-03-07 PROCEDURE — G8417 CALC BMI ABV UP PARAM F/U: HCPCS | Performed by: INTERNAL MEDICINE

## 2023-03-07 PROCEDURE — G8536 NO DOC ELDER MAL SCRN: HCPCS | Performed by: INTERNAL MEDICINE

## 2023-03-07 PROCEDURE — 99214 OFFICE O/P EST MOD 30 MIN: CPT | Performed by: INTERNAL MEDICINE

## 2023-03-07 PROCEDURE — 3017F COLORECTAL CA SCREEN DOC REV: CPT | Performed by: INTERNAL MEDICINE

## 2023-03-07 PROCEDURE — 1090F PRES/ABSN URINE INCON ASSESS: CPT | Performed by: INTERNAL MEDICINE

## 2023-03-07 PROCEDURE — G0463 HOSPITAL OUTPT CLINIC VISIT: HCPCS | Performed by: INTERNAL MEDICINE

## 2023-03-07 PROCEDURE — G8427 DOCREV CUR MEDS BY ELIG CLIN: HCPCS | Performed by: INTERNAL MEDICINE

## 2023-03-07 RX ORDER — LATANOPROST 50 UG/ML
SOLUTION/ DROPS OPHTHALMIC
COMMUNITY
Start: 2023-01-06

## 2023-03-07 NOTE — PROGRESS NOTES
1. \"Have you been to the ER, urgent care clinic since your last visit? Hospitalized since your last visit? \" No    2. \"Have you seen or consulted any other health care providers outside of the [address] [place] [address] since your last visit? \" No     3. For patients aged 39-70: Has the patient had a colonoscopy / FIT/ Cologuard? Yes - Care Gap present. Most recent result on file      If the patient is female:    4. For patients aged 41-77: Has the patient had a mammogram within the past 2 years? Yes - Care Gap present. Most recent result on file      5. For patients aged 21-65: Has the patient had a pap smear? Yes - Care Gap present.  Most recent result on file

## 2023-05-19 RX ORDER — AMLODIPINE BESYLATE 2.5 MG/1
2.5 TABLET ORAL DAILY
Qty: 30 TABLET | Refills: 0 | Status: SHIPPED | OUTPATIENT
Start: 2023-05-19

## 2023-05-19 NOTE — TELEPHONE ENCOUNTER
Future Appointments:  Future Appointments   Date Time Provider Rio Browni   6/19/2023 12:15 PM Lance Bello MD [facility] BS AMB   8/28/2023  1:00 PM MD HESHAM Medel AMB        Last Appointment With Me:  3/7/2023     Requested Prescriptions     Pending Prescriptions Disp Refills    amLODIPine (NORVASC) 2.5 MG tablet 90 tablet 1     Sig: Take 1 tablet by mouth daily

## 2023-05-19 NOTE — TELEPHONE ENCOUNTER
Caller requests Refill of:  amLODIPine (NORVASC) 2.5 MG tablet      Please send to:  [id number] [facility] [id number]  Store number: [id number]  Near the intersection of: [address] [place]          Visit Appointment History:  Future Appointments:  Future Appointments   Date Time Provider Rio Polo   6/19/2023 12:15 PM Popeye Reyes MD [facility] BS AMB   8/28/2023  1:00 PM Joanna Hall MD [facility] BS AMB         Last Appointment With Me:  3/7/2023         Caller confirmed instructions and dosages as correct. Caller was advised that Meds will be refilled as soon as possible, however there can be a 48-72 business hour turn around on refill requests.

## 2023-05-22 RX ORDER — AMLODIPINE BESYLATE 2.5 MG/1
2.5 TABLET ORAL DAILY
Qty: 90 TABLET | Refills: 0 | Status: SHIPPED | OUTPATIENT
Start: 2023-05-22

## 2023-05-22 NOTE — TELEPHONE ENCOUNTER
PCP: Maida Matthew MD    Last appt: 3/7/2023   Future Appointments   Date Time Provider Rio Polo   6/19/2023 12:15 PM Roman Willoughby MD [facility] BS AMB   8/28/2023  1:00 PM MD HESHAM Castaneda BS AMB       Requested Prescriptions     Pending Prescriptions Disp Refills    amLODIPine (NORVASC) 2.5 MG tablet 90 tablet 0     Sig: Take 1 tablet by mouth daily

## 2023-06-02 DIAGNOSIS — R73.01 IMPAIRED FASTING GLUCOSE: ICD-10-CM

## 2023-06-02 DIAGNOSIS — K21.9 GASTRO-ESOPHAGEAL REFLUX DISEASE WITHOUT ESOPHAGITIS: ICD-10-CM

## 2023-06-02 DIAGNOSIS — Z01.810 ENCOUNTER FOR PREPROCEDURAL CARDIOVASCULAR EXAMINATION: Primary | ICD-10-CM

## 2023-06-02 DIAGNOSIS — R05.3 CHRONIC COUGH: ICD-10-CM

## 2023-06-02 DIAGNOSIS — M85.80 OSTEOPENIA, UNSPECIFIED LOCATION: ICD-10-CM

## 2023-06-02 DIAGNOSIS — I34.0 NONRHEUMATIC MITRAL VALVE REGURGITATION: ICD-10-CM

## 2023-06-02 DIAGNOSIS — I10 ESSENTIAL (PRIMARY) HYPERTENSION: ICD-10-CM

## 2023-06-27 ENCOUNTER — TELEPHONE (OUTPATIENT)
Age: 73
End: 2023-06-27

## 2023-06-27 RX ORDER — AMLODIPINE BESYLATE 2.5 MG/1
2.5 TABLET ORAL DAILY
Qty: 90 TABLET | Refills: 0 | Status: SHIPPED | OUTPATIENT
Start: 2023-06-27

## 2023-07-18 ENCOUNTER — NURSE ONLY (OUTPATIENT)
Age: 73
End: 2023-07-18

## 2023-07-18 DIAGNOSIS — Z01.810 ENCOUNTER FOR PREPROCEDURAL CARDIOVASCULAR EXAMINATION: ICD-10-CM

## 2023-07-18 DIAGNOSIS — K21.9 GASTRO-ESOPHAGEAL REFLUX DISEASE WITHOUT ESOPHAGITIS: ICD-10-CM

## 2023-07-18 DIAGNOSIS — R73.01 IMPAIRED FASTING GLUCOSE: ICD-10-CM

## 2023-07-18 DIAGNOSIS — I10 ESSENTIAL (PRIMARY) HYPERTENSION: ICD-10-CM

## 2023-07-18 DIAGNOSIS — M85.80 OSTEOPENIA, UNSPECIFIED LOCATION: ICD-10-CM

## 2023-07-18 DIAGNOSIS — R05.3 CHRONIC COUGH: ICD-10-CM

## 2023-07-18 DIAGNOSIS — I34.0 NONRHEUMATIC MITRAL VALVE REGURGITATION: ICD-10-CM

## 2023-07-19 LAB
ALBUMIN SERPL-MCNC: 4.5 G/DL (ref 3.5–5)
ALBUMIN/GLOB SERPL: 1.6 (ref 1.1–2.2)
ALP SERPL-CCNC: 69 U/L (ref 45–117)
ALT SERPL-CCNC: 20 U/L (ref 12–78)
ANION GAP SERPL CALC-SCNC: 6 MMOL/L (ref 5–15)
AST SERPL-CCNC: 17 U/L (ref 15–37)
BILIRUB SERPL-MCNC: 0.4 MG/DL (ref 0.2–1)
BUN SERPL-MCNC: 16 MG/DL (ref 6–20)
BUN/CREAT SERPL: 17 (ref 12–20)
CALCIUM SERPL-MCNC: 10.6 MG/DL (ref 8.5–10.1)
CHLORIDE SERPL-SCNC: 103 MMOL/L (ref 97–108)
CHOLEST SERPL-MCNC: 148 MG/DL
CO2 SERPL-SCNC: 30 MMOL/L (ref 21–32)
CREAT SERPL-MCNC: 0.93 MG/DL (ref 0.55–1.02)
ERYTHROCYTE [DISTWIDTH] IN BLOOD BY AUTOMATED COUNT: 13.2 % (ref 11.5–14.5)
EST. AVERAGE GLUCOSE BLD GHB EST-MCNC: 120 MG/DL
GLOBULIN SER CALC-MCNC: 2.9 G/DL (ref 2–4)
GLUCOSE SERPL-MCNC: 85 MG/DL (ref 65–100)
HBA1C MFR BLD: 5.8 % (ref 4–5.6)
HCT VFR BLD AUTO: 36 % (ref 35–47)
HDLC SERPL-MCNC: 69 MG/DL
HDLC SERPL: 2.1 (ref 0–5)
HGB BLD-MCNC: 11.3 G/DL (ref 11.5–16)
LDLC SERPL CALC-MCNC: 63.8 MG/DL (ref 0–100)
MCH RBC QN AUTO: 30 PG (ref 26–34)
MCHC RBC AUTO-ENTMCNC: 31.4 G/DL (ref 30–36.5)
MCV RBC AUTO: 95.5 FL (ref 80–99)
NRBC # BLD: 0 K/UL (ref 0–0.01)
NRBC BLD-RTO: 0 PER 100 WBC
PLATELET # BLD AUTO: 400 K/UL (ref 150–400)
PMV BLD AUTO: 10.7 FL (ref 8.9–12.9)
POTASSIUM SERPL-SCNC: 5 MMOL/L (ref 3.5–5.1)
PROT SERPL-MCNC: 7.4 G/DL (ref 6.4–8.2)
RBC # BLD AUTO: 3.77 M/UL (ref 3.8–5.2)
SODIUM SERPL-SCNC: 139 MMOL/L (ref 136–145)
TRIGL SERPL-MCNC: 76 MG/DL
TSH SERPL DL<=0.05 MIU/L-ACNC: 2.84 UIU/ML (ref 0.36–3.74)
VLDLC SERPL CALC-MCNC: 15.2 MG/DL
WBC # BLD AUTO: 5.8 K/UL (ref 3.6–11)

## 2023-08-08 ASSESSMENT — ENCOUNTER SYMPTOMS: SHORTNESS OF BREATH: 0

## 2023-08-08 NOTE — PROGRESS NOTES
HISTORY OF PRESENT ILLNESS  Veronica Leach is a 81 y.o. female. HPI    This is an established visit completed with telemedicine was completed with video assist. The patient acknowledges and agrees to this method of visitation. Last here 3/7/23. Pt is here for routine care. She mentions she is having issues with transportation as she doesn't drive and her  is working. She states paying for an [employer] is expensive  Will have  contact her     Has a history of hypertension  BP at home 125/60  Pt is on losartan 100 mg, hctz 25 mg, norvasc 2.5 mg   Recall pt had a cough on lisinopril. She was taking this medication for 20 years. Wt today is 187 lbs per pt, down 4 lbs since lov   Discussed diet and wt/l  States she has noticed a difference in how her clothes fit    Reviewed labs   Ordered labs  Discussed elevated calcium, mild anemia, will repeat     Last EKG 8/28/22 was nsr       Pt follows with Dr. Festus Valdez (GI) for nausea and hx diarrhea  Pt is taking protonix  once daily previously twice daily  Previously took amitiza prn but no longer on this, had hemorrhoid surgery. Lov CT abd pelv 7/1/22, abnormal      Pt sees Dr. Vijay Allison (cardio) for mitral valve regurgitation  Last there 8/22/22  Will f/U annually follow-up is pending for later this month August 2023     Recall echo 8/22/22:    Left Ventricle: Normal left ventricular systolic function with a visually estimated EF of 60 - 65%. Left ventricle size is normal. Normal wall thickness. Normal wall motion. Normal diastolic function. Mitral Valve: Mild regurgitation. Recall stress echo 8/27/21:  Baseline ECG: Normal sinus rhythm.   Stress test: Negative stress test.  Echo: Normal stress echocardiogram.     Pt has a hx of chronic recurrent cough x 9/2017  She has seen Dr. Gladys Aleman (ENT) about this in 2019 and is currently following with GI (see above)  She takes zyrtec in AM, Claritin at night, and flonase which improved her symptoms in

## 2023-08-10 SDOH — ECONOMIC STABILITY: HOUSING INSECURITY
IN THE LAST 12 MONTHS, WAS THERE A TIME WHEN YOU DID NOT HAVE A STEADY PLACE TO SLEEP OR SLEPT IN A SHELTER (INCLUDING NOW)?: NO

## 2023-08-10 SDOH — HEALTH STABILITY: PHYSICAL HEALTH: ON AVERAGE, HOW MANY MINUTES DO YOU ENGAGE IN EXERCISE AT THIS LEVEL?: 60 MIN

## 2023-08-10 SDOH — HEALTH STABILITY: PHYSICAL HEALTH: ON AVERAGE, HOW MANY DAYS PER WEEK DO YOU ENGAGE IN MODERATE TO STRENUOUS EXERCISE (LIKE A BRISK WALK)?: 5 DAYS

## 2023-08-10 SDOH — ECONOMIC STABILITY: FOOD INSECURITY: WITHIN THE PAST 12 MONTHS, YOU WORRIED THAT YOUR FOOD WOULD RUN OUT BEFORE YOU GOT MONEY TO BUY MORE.: SOMETIMES TRUE

## 2023-08-10 SDOH — ECONOMIC STABILITY: FOOD INSECURITY: WITHIN THE PAST 12 MONTHS, THE FOOD YOU BOUGHT JUST DIDN'T LAST AND YOU DIDN'T HAVE MONEY TO GET MORE.: SOMETIMES TRUE

## 2023-08-10 SDOH — ECONOMIC STABILITY: INCOME INSECURITY: HOW HARD IS IT FOR YOU TO PAY FOR THE VERY BASICS LIKE FOOD, HOUSING, MEDICAL CARE, AND HEATING?: NOT VERY HARD

## 2023-08-10 SDOH — ECONOMIC STABILITY: TRANSPORTATION INSECURITY
IN THE PAST 12 MONTHS, HAS LACK OF TRANSPORTATION KEPT YOU FROM MEETINGS, WORK, OR FROM GETTING THINGS NEEDED FOR DAILY LIVING?: YES

## 2023-08-10 ASSESSMENT — PATIENT HEALTH QUESTIONNAIRE - PHQ9
SUM OF ALL RESPONSES TO PHQ QUESTIONS 1-9: 0
2. FEELING DOWN, DEPRESSED OR HOPELESS: 0
SUM OF ALL RESPONSES TO PHQ QUESTIONS 1-9: 0
SUM OF ALL RESPONSES TO PHQ9 QUESTIONS 1 & 2: 0
1. LITTLE INTEREST OR PLEASURE IN DOING THINGS: 0

## 2023-08-10 ASSESSMENT — LIFESTYLE VARIABLES
HOW OFTEN DO YOU HAVE A DRINK CONTAINING ALCOHOL: 1
HOW MANY STANDARD DRINKS CONTAINING ALCOHOL DO YOU HAVE ON A TYPICAL DAY: 0
HOW OFTEN DO YOU HAVE SIX OR MORE DRINKS ON ONE OCCASION: 1
HOW OFTEN DO YOU HAVE A DRINK CONTAINING ALCOHOL: NEVER
HOW MANY STANDARD DRINKS CONTAINING ALCOHOL DO YOU HAVE ON A TYPICAL DAY: PATIENT DOES NOT DRINK

## 2023-08-10 NOTE — PROGRESS NOTES
Medicare Annual Wellness Visit    Hunter Abdi is here for Medicare AWV    Assessment & Plan   Pure hypercholesterolemia  Essential hypertension with goal blood pressure less than 130/85  Osteopenia of multiple sites  Medicare annual wellness visit, subsequent  Nonrheumatic mitral valve regurgitation     Recommendations for Preventive Services Due: see orders and patient instructions/AVS.  Recommended screening schedule for the next 5-10 years is provided to the patient in written form: see Patient Instructions/AVS.     Return in about 6 months (around 2/11/2024). Subjective       Patient's complete Health Risk Assessment and screening values have been reviewed and are found in Flowsheets. The following problems were reviewed today and where indicated follow up appointments were made and/or referrals ordered. Positive Risk Factor Screenings with Interventions:                 Weight and Activity:  Physical Activity: Sufficiently Active    Days of Exercise per Week: 5 days    Minutes of Exercise per Session: 60 min     On average, how many days per week do you engage in moderate to strenuous exercise (like a brisk walk)?: 5 days  Have you lost any weight without trying in the past 3 months?: No  There is no height or weight on file to calculate BMI. (!) Abnormal  Obesity Interventions:  low carbohydrate diet             Safety:  Do you have either shower bars, grab bars, non-slip mats or non-slip surfaces in your shower or bathtub?: (!) No  Interventions:  Discussed installation     Advanced Directives:  Do you have a Living Will?: (!) No  Intervention:  has NO advanced directive - information provided                   Objective      Patient-Reported Vitals  Patient-Reported Weight: 191              No Known Allergies  Prior to Visit Medications    Medication Sig Taking?  Authorizing Provider   simvastatin (ZOCOR) 40 MG tablet Take 1 tablet by mouth daily Yes Naye Garcia MD   amLODIPine (NORVASC) 2.5

## 2023-08-11 ENCOUNTER — TELEMEDICINE (OUTPATIENT)
Age: 73
End: 2023-08-11
Payer: MEDICARE

## 2023-08-11 DIAGNOSIS — Z00.00 MEDICARE ANNUAL WELLNESS VISIT, SUBSEQUENT: ICD-10-CM

## 2023-08-11 DIAGNOSIS — M85.89 OSTEOPENIA OF MULTIPLE SITES: ICD-10-CM

## 2023-08-11 DIAGNOSIS — I34.0 NONRHEUMATIC MITRAL VALVE REGURGITATION: ICD-10-CM

## 2023-08-11 DIAGNOSIS — D64.9 ANEMIA, UNSPECIFIED TYPE: ICD-10-CM

## 2023-08-11 DIAGNOSIS — I10 ESSENTIAL HYPERTENSION WITH GOAL BLOOD PRESSURE LESS THAN 130/85: ICD-10-CM

## 2023-08-11 DIAGNOSIS — E55.9 VITAMIN D DEFICIENCY: ICD-10-CM

## 2023-08-11 DIAGNOSIS — E83.52 HYPERCALCEMIA: ICD-10-CM

## 2023-08-11 DIAGNOSIS — E78.00 PURE HYPERCHOLESTEROLEMIA: Primary | ICD-10-CM

## 2023-08-11 PROCEDURE — 3017F COLORECTAL CA SCREEN DOC REV: CPT | Performed by: INTERNAL MEDICINE

## 2023-08-11 PROCEDURE — 99214 OFFICE O/P EST MOD 30 MIN: CPT | Performed by: INTERNAL MEDICINE

## 2023-08-11 PROCEDURE — 1123F ACP DISCUSS/DSCN MKR DOCD: CPT | Performed by: INTERNAL MEDICINE

## 2023-08-11 PROCEDURE — 1090F PRES/ABSN URINE INCON ASSESS: CPT | Performed by: INTERNAL MEDICINE

## 2023-08-11 PROCEDURE — G8399 PT W/DXA RESULTS DOCUMENT: HCPCS | Performed by: INTERNAL MEDICINE

## 2023-08-11 PROCEDURE — G8427 DOCREV CUR MEDS BY ELIG CLIN: HCPCS | Performed by: INTERNAL MEDICINE

## 2023-08-11 PROCEDURE — G0439 PPPS, SUBSEQ VISIT: HCPCS | Performed by: INTERNAL MEDICINE

## 2023-08-11 RX ORDER — SIMVASTATIN 40 MG
40 TABLET ORAL DAILY
Qty: 90 TABLET | Refills: 1 | Status: SHIPPED | OUTPATIENT
Start: 2023-08-11

## 2023-08-11 ASSESSMENT — LIFESTYLE VARIABLES
HOW OFTEN DO YOU HAVE A DRINK CONTAINING ALCOHOL: NEVER
HOW MANY STANDARD DRINKS CONTAINING ALCOHOL DO YOU HAVE ON A TYPICAL DAY: PATIENT DOES NOT DRINK

## 2023-08-11 NOTE — PROGRESS NOTES
1. \"Have you been to the ER, urgent care clinic since your last visit? Hospitalized since your last visit? \" No    2. \"Have you seen or consulted any other health care providers outside of the [address] [place] [address] since your last visit? \" No     3. For patients aged 43-73: Has the patient had a colonoscopy / FIT/ Cologuard? Yes - Care Gap present. Most recent result on file      If the patient is female:    4. For patients aged 43-66: Has the patient had a mammogram within the past 2 years? Yes - Care Gap present. Most recent result on file      5. For patients aged 21-65: Has the patient had a pap smear?  NA - based on age or sex

## 2023-08-16 RX ORDER — HYDROCHLOROTHIAZIDE 25 MG/1
25 TABLET ORAL DAILY
Qty: 30 TABLET | Refills: 2 | Status: SHIPPED | OUTPATIENT
Start: 2023-08-16

## 2023-08-16 RX ORDER — LOSARTAN POTASSIUM 100 MG/1
100 TABLET ORAL DAILY
Qty: 30 TABLET | Refills: 2 | Status: SHIPPED | OUTPATIENT
Start: 2023-08-16

## 2023-08-16 NOTE — TELEPHONE ENCOUNTER
PCP: Watson Roldan MD    Last appt: 8/11/2023  Future Appointments   Date Time Provider [address]   8/28/2023  1:00 PM MD HESHAM Ortiz BS AMB   2/12/2024 11:30 AM Jossie uCeto MD [facility] BS AMB       Requested Prescriptions     Pending Prescriptions Disp Refills    losartan (COZAAR) 100 MG tablet 30 tablet 2     Sig: Take 1 tablet by mouth daily    hydroCHLOROthiazide (HYDRODIURIL) 25 MG tablet 30 tablet 2     Sig: Take 1 tablet by mouth daily

## 2023-08-16 NOTE — TELEPHONE ENCOUNTER
90 day supply     Caller requests Refill of:  losartan (COZAAR) 100 MG tablet  hydroCHLOROthiazide (HYDRODIURIL) 25 MG tablet      Please send to:  [address] [address]  Store number: [id number]  Near the intersection of: [address]           Visit / Appointment History:  Future Appointments:  Future Appointments   Date Time Provider [address]   8/28/2023  1:00 PM Claudette Shook, MD [facility] BS AMB   2/12/2024 11:30 AM Efrem Logan MD [facility] BS AMB         Last Appointment With Me:  8/11/2023         Caller confirmed instructions and dosages as correct. Caller was advised that Meds will be refilled as soon as possible, however there can be a 48-72 business hour turn around on refill requests.

## 2023-08-28 ENCOUNTER — OFFICE VISIT (OUTPATIENT)
Age: 73
End: 2023-08-28
Payer: MEDICARE

## 2023-08-28 VITALS
WEIGHT: 190 LBS | HEIGHT: 66 IN | SYSTOLIC BLOOD PRESSURE: 138 MMHG | HEART RATE: 67 BPM | RESPIRATION RATE: 16 BRPM | BODY MASS INDEX: 30.53 KG/M2 | DIASTOLIC BLOOD PRESSURE: 80 MMHG | OXYGEN SATURATION: 99 %

## 2023-08-28 DIAGNOSIS — I10 ESSENTIAL (PRIMARY) HYPERTENSION: ICD-10-CM

## 2023-08-28 DIAGNOSIS — I34.0 NONRHEUMATIC MITRAL (VALVE) INSUFFICIENCY: Primary | ICD-10-CM

## 2023-08-28 DIAGNOSIS — R01.1 CARDIAC MURMUR, UNSPECIFIED: ICD-10-CM

## 2023-08-28 DIAGNOSIS — E78.2 MIXED HYPERLIPIDEMIA: ICD-10-CM

## 2023-08-28 PROCEDURE — G8399 PT W/DXA RESULTS DOCUMENT: HCPCS | Performed by: INTERNAL MEDICINE

## 2023-08-28 PROCEDURE — 1090F PRES/ABSN URINE INCON ASSESS: CPT | Performed by: INTERNAL MEDICINE

## 2023-08-28 PROCEDURE — 3079F DIAST BP 80-89 MM HG: CPT | Performed by: INTERNAL MEDICINE

## 2023-08-28 PROCEDURE — 93005 ELECTROCARDIOGRAM TRACING: CPT | Performed by: INTERNAL MEDICINE

## 2023-08-28 PROCEDURE — 93010 ELECTROCARDIOGRAM REPORT: CPT | Performed by: INTERNAL MEDICINE

## 2023-08-28 PROCEDURE — G8428 CUR MEDS NOT DOCUMENT: HCPCS | Performed by: INTERNAL MEDICINE

## 2023-08-28 PROCEDURE — G8417 CALC BMI ABV UP PARAM F/U: HCPCS | Performed by: INTERNAL MEDICINE

## 2023-08-28 PROCEDURE — 99214 OFFICE O/P EST MOD 30 MIN: CPT | Performed by: INTERNAL MEDICINE

## 2023-08-28 PROCEDURE — 3017F COLORECTAL CA SCREEN DOC REV: CPT | Performed by: INTERNAL MEDICINE

## 2023-08-28 PROCEDURE — 1036F TOBACCO NON-USER: CPT | Performed by: INTERNAL MEDICINE

## 2023-08-28 PROCEDURE — 1123F ACP DISCUSS/DSCN MKR DOCD: CPT | Performed by: INTERNAL MEDICINE

## 2023-08-28 PROCEDURE — 3075F SYST BP GE 130 - 139MM HG: CPT | Performed by: INTERNAL MEDICINE

## 2023-08-28 ASSESSMENT — PATIENT HEALTH QUESTIONNAIRE - PHQ9
SUM OF ALL RESPONSES TO PHQ QUESTIONS 1-9: 0
1. LITTLE INTEREST OR PLEASURE IN DOING THINGS: 0
2. FEELING DOWN, DEPRESSED OR HOPELESS: 0
SUM OF ALL RESPONSES TO PHQ9 QUESTIONS 1 & 2: 0
SUM OF ALL RESPONSES TO PHQ QUESTIONS 1-9: 0

## 2023-08-28 NOTE — PROGRESS NOTES
11:17 AM    CHOLHDLRATIO 2.1 07/18/2023 11:17 AM         ECHO:  08/22/22    TRANSTHORACIC ECHOCARDIOGRAM (TTE) COMPLETE (CONTRAST/BUBBLE/3D PRN) 08/23/2022  7:08 PM, 08/23/2022 12:00 AM (Final)    Narrative  This is a summary report. The complete report is available in the patient's medical record. If you cannot access the medical record, please contact the sending organization for a detailed fax or copy. Formatting of this result is different from the original.      Left Ventricle: Normal left ventricular systolic function with a visually estimated EF of 60 - 65%. Left ventricle size is normal. Normal wall thickness. Normal wall motion. Normal diastolic function. Mitral Valve: Mild regurgitation. Signed by: Kathi Osborn MD on 8/23/2022  7:08 PM, Signed by: Unknown Provider Result on 8/23/2022 12:00 AM      STRESS:  No results found for this or any previous visit. CARDIAC CATH  No results found for this or any previous visit. EKG:  NSR      Assessment:         Diagnosis Orders   1. Nonrheumatic mitral (valve) insufficiency  EKG 12 Lead      2. Essential (primary) hypertension  EKG 12 Lead      3. Cardiac murmur, unspecified  EKG 12 Lead      4. Mixed hyperlipidemia            Orders Placed This Encounter   Procedures    EKG 12 Lead     Order Specific Question:   Reason for Exam?     Answer: Other     Comments:   routine        Plan:      Nonrheumatic mitral valve regurgitation   Echo 8/22/2022 normal LVEF, only mild mitral regurgitation   Echo done 6/2021 with preserved LVEF 60-65%, with mild-mod MR   echo in 11/2017 with mild MR, EF 55-60%  We will plan for repeat echo in August 2025 with no change in symptoms or murmur   Continue afterload reduction for now      Normal Stress echo in 8/2021       Essential hypertension with goal blood pressure less than 130/85   Controlled. And she states this typically about 10 points lower at home.   She states home blood pressure is about

## 2023-09-20 RX ORDER — AMLODIPINE BESYLATE 2.5 MG/1
2.5 TABLET ORAL DAILY
Qty: 90 TABLET | Refills: 0 | Status: SHIPPED | OUTPATIENT
Start: 2023-09-20

## 2023-09-20 NOTE — TELEPHONE ENCOUNTER
PCP: Yung Polanco MD    Last appt: 8/11/2023  Future Appointments   Date Time Provider [address]   2/12/2024 11:30 AM Tom House MD [facility] BS AMB   9/9/2024  1:00 PM NAHUN Meeks - NP BS BS AMB       Requested Prescriptions     Pending Prescriptions Disp Refills    amLODIPine (NORVASC) 2.5 MG tablet 90 tablet 0     Sig: Take 1 tablet by mouth daily

## 2023-10-12 ENCOUNTER — TELEPHONE (OUTPATIENT)
Age: 73
End: 2023-10-12

## 2023-10-12 NOTE — TELEPHONE ENCOUNTER
Pt states she tested positive for COVID on 10-3-23 and is still having symptoms, not getting any better and still test positive. Please call pt to advise she ask.

## 2023-10-12 NOTE — TELEPHONE ENCOUNTER
Called, Spoke with Pt. Received two pt identifiers  Pt states Did covid test at [facility] on 10/3/23 and tested positive  Was told to only take mucinex  Pt offered and accepted virtual appt for 10/13/23 @ 915  Pt verbalizes understanding of the instructions and has no further questions at this time.

## 2023-10-13 ENCOUNTER — TELEMEDICINE (OUTPATIENT)
Age: 73
End: 2023-10-13
Payer: MEDICARE

## 2023-10-13 ENCOUNTER — HOSPITAL ENCOUNTER (OUTPATIENT)
Facility: HOSPITAL | Age: 73
End: 2023-10-13
Payer: MEDICARE

## 2023-10-13 DIAGNOSIS — U07.1 COVID: ICD-10-CM

## 2023-10-13 DIAGNOSIS — U07.1 COVID: Primary | ICD-10-CM

## 2023-10-13 DIAGNOSIS — I10 ESSENTIAL HYPERTENSION WITH GOAL BLOOD PRESSURE LESS THAN 130/85: ICD-10-CM

## 2023-10-13 DIAGNOSIS — R05.3 CHRONIC COUGH: ICD-10-CM

## 2023-10-13 PROCEDURE — 1090F PRES/ABSN URINE INCON ASSESS: CPT | Performed by: INTERNAL MEDICINE

## 2023-10-13 PROCEDURE — 1123F ACP DISCUSS/DSCN MKR DOCD: CPT | Performed by: INTERNAL MEDICINE

## 2023-10-13 PROCEDURE — 99213 OFFICE O/P EST LOW 20 MIN: CPT | Performed by: INTERNAL MEDICINE

## 2023-10-13 PROCEDURE — G8427 DOCREV CUR MEDS BY ELIG CLIN: HCPCS | Performed by: INTERNAL MEDICINE

## 2023-10-13 PROCEDURE — 3017F COLORECTAL CA SCREEN DOC REV: CPT | Performed by: INTERNAL MEDICINE

## 2023-10-13 PROCEDURE — G8399 PT W/DXA RESULTS DOCUMENT: HCPCS | Performed by: INTERNAL MEDICINE

## 2023-10-13 PROCEDURE — 71046 X-RAY EXAM CHEST 2 VIEWS: CPT

## 2023-10-13 RX ORDER — ALBUTEROL SULFATE 90 UG/1
2 AEROSOL, METERED RESPIRATORY (INHALATION) 4 TIMES DAILY PRN
Qty: 54 G | Refills: 1 | Status: SHIPPED | OUTPATIENT
Start: 2023-10-13

## 2023-10-13 RX ORDER — BENZONATATE 100 MG/1
100 CAPSULE ORAL 3 TIMES DAILY PRN
Qty: 30 CAPSULE | Refills: 0 | Status: SHIPPED | OUTPATIENT
Start: 2023-10-13 | End: 2023-10-23

## 2023-10-13 RX ORDER — FLUTICASONE PROPIONATE AND SALMETEROL 250; 50 UG/1; UG/1
1 POWDER RESPIRATORY (INHALATION) EVERY 12 HOURS
Qty: 60 EACH | Refills: 3 | Status: SHIPPED | OUTPATIENT
Start: 2023-10-13

## 2023-10-13 SDOH — ECONOMIC STABILITY: FOOD INSECURITY: WITHIN THE PAST 12 MONTHS, YOU WORRIED THAT YOUR FOOD WOULD RUN OUT BEFORE YOU GOT MONEY TO BUY MORE.: NEVER TRUE

## 2023-10-13 SDOH — ECONOMIC STABILITY: INCOME INSECURITY: HOW HARD IS IT FOR YOU TO PAY FOR THE VERY BASICS LIKE FOOD, HOUSING, MEDICAL CARE, AND HEATING?: NOT HARD AT ALL

## 2023-10-13 SDOH — ECONOMIC STABILITY: FOOD INSECURITY: WITHIN THE PAST 12 MONTHS, THE FOOD YOU BOUGHT JUST DIDN'T LAST AND YOU DIDN'T HAVE MONEY TO GET MORE.: NEVER TRUE

## 2023-10-13 ASSESSMENT — PATIENT HEALTH QUESTIONNAIRE - PHQ9
SUM OF ALL RESPONSES TO PHQ9 QUESTIONS 1 & 2: 0
1. LITTLE INTEREST OR PLEASURE IN DOING THINGS: 0
SUM OF ALL RESPONSES TO PHQ QUESTIONS 1-9: 0
2. FEELING DOWN, DEPRESSED OR HOPELESS: 0

## 2023-10-13 ASSESSMENT — ENCOUNTER SYMPTOMS: SHORTNESS OF BREATH: 0

## 2023-10-13 NOTE — PROGRESS NOTES
1. \"Have you been to the ER, urgent care clinic since your last visit? Hospitalized since your last visit? \" No    2. \"Have you seen or consulted any other health care providers outside of the [address] [place] [address] since your last visit? \" No     3. For patients aged 43-73: Has the patient had a colonoscopy / FIT/ Cologuard? Yes - Care Gap present. Most recent result on file      If the patient is female:    4. For patients aged 43-66: Has the patient had a mammogram within the past 2 years? Yes - Care Gap present. Most recent result on file      5. For patients aged 21-65: Has the patient had a pap smear?  NA - based on age or sex

## 2023-10-13 NOTE — PROGRESS NOTES
HISTORY OF PRESENT ILLNESS  Moses Bradford is a 81 y.o. female. HPI    This is an established visit completed with telemedicine was completed with video assist. The patient acknowledges and agrees to this method of visitation. Last here vv 8/11/23. She presents for acute care    COVID positive 10/3/23, still positive this morning  She reports rhinorrhea, congestion, cough, chills, HA,   States her  also tested positive  States sx have not improved but they are mild overall, states she has still been walking 3.5 miles daily  Denies fatigue, fever, chills, body aches  Not taking zyrtec  No longer taking advair d/t cost  Ordered chest XR  Advised her to contact our office in the future for treatment  Discussed it is normal to have positive COVID tests for a few weeks after infection  Rx'd wixela, tessalon for cough, albuterol prn    Has a history of hypertension  BP at home 107/62 115/64 109/69   BP at cardio 138/80  Pt is on losartan 100 mg, hctz 25 mg, norvasc 2.5 mg   Recall pt had a cough on lisinopril. She was taking this medication for 20 years.     Wt today is 185 lbs per pt, stable since lov    Patient Active Problem List   Diagnosis    Essential hypertension with goal blood pressure less than 130/85    Pure hypercholesterolemia    Esophagitis    Osteopenia    Nonrheumatic mitral valve regurgitation    Constipation    Vertigo    Glaucoma     Current Outpatient Medications   Medication Sig Dispense Refill    amLODIPine (NORVASC) 2.5 MG tablet Take 1 tablet by mouth daily 90 tablet 0    losartan (COZAAR) 100 MG tablet Take 1 tablet by mouth daily 30 tablet 2    hydroCHLOROthiazide (HYDRODIURIL) 25 MG tablet Take 1 tablet by mouth daily 30 tablet 2    simvastatin (ZOCOR) 40 MG tablet Take 1 tablet by mouth daily 90 tablet 1    acetaminophen (TYLENOL) 650 MG extended release tablet Take by mouth every 8 hours      vitamin D (CHOLECALCIFEROL) 25 MCG (1000 UT) TABS tablet Take by mouth daily

## 2023-11-20 RX ORDER — HYDROCHLOROTHIAZIDE 25 MG/1
25 TABLET ORAL DAILY
Qty: 90 TABLET | Refills: 1 | Status: SHIPPED | OUTPATIENT
Start: 2023-11-20

## 2023-11-20 RX ORDER — LOSARTAN POTASSIUM 100 MG/1
100 TABLET ORAL DAILY
Qty: 90 TABLET | Refills: 1 | Status: SHIPPED | OUTPATIENT
Start: 2023-11-20

## 2023-11-20 NOTE — TELEPHONE ENCOUNTER
losartan (COZAAR) 100 MG tablet    hydroCHLOROthiazide (HYDRODIURIL) 25 MG tablet     Pt is completely out of these medications. Advised of 48/72 hour turn around on refills.      [facility] #[phone]

## 2023-11-20 NOTE — TELEPHONE ENCOUNTER
Future Appointments:  Future Appointments   Date Time Provider [address]   2/12/2024 11:30 AM Vangie Haines MD [facility] BS AMB   9/9/2024  1:00 PM NAHUN Castillo NP BS BS AMB        Last Appointment With Me:  10/13/2023     Requested Prescriptions     Pending Prescriptions Disp Refills    losartan (COZAAR) 100 MG tablet 90 tablet 1     Sig: Take 1 tablet by mouth daily    hydroCHLOROthiazide (HYDRODIURIL) 25 MG tablet 90 tablet 1     Sig: Take 1 tablet by mouth daily

## 2023-12-29 RX ORDER — AMLODIPINE BESYLATE 2.5 MG/1
2.5 TABLET ORAL DAILY
Qty: 90 TABLET | Refills: 0 | Status: SHIPPED | OUTPATIENT
Start: 2023-12-29

## 2023-12-29 NOTE — TELEPHONE ENCOUNTER
Patient called in to check on status of med refill, she states pharmacy has sent request via fax 3 times since 12/24 with no response.   Patient was advised Jannie Cobian is out fo the office this week but we will send her a request to try to fill before she goes out of town tomorrow

## 2024-01-31 NOTE — PROGRESS NOTES
HISTORY OF PRESENT ILLNESS  Virgen Trejo is a 73 y.o. female.  HPI    Last here 8/11/23. Pt is here for routine care.      She reports back pain/spasm x 2-3 months  She is taking tylenol, did not relieve sx  States this wakes her up at night  Denies fever, chills, bladder/bowel incontinence, radiation to legs  Ordered PT   Will give tizanidine prn     Has a history of hypertension  BP today 122/68  BP at home : 121/65 129/67 114/64 121/65 115/57 125/60 104/57 100/63 110/49  Pt is on losartan 100 mg, hctz 25 mg, norvasc 2.5 mg   Recall pt had a cough on lisinopril. She was taking this medication for 20 years.     Wt today is 189 lbs , up 2lbs since lov   Discussed diet and wt/l    Reviewed labs   Ordered labs    She was covid positive with cough 8/23  Reviewed XR chest 10/13/23  IMPRESSION:     Normal PA and lateral chest views    Last EKG 8/28/22 was nsr        Pt follows with Dr. Knapp (GI) for nausea and hx diarrhea  Pt is taking protonix  once daily previously twice daily  Previously took amitiza prn but no longer on this, had hemorrhoid surgery.   Lov CT abd pelv 7/1/22, abnormal      Pt sees Dr. Brewer (cardio) for mitral valve regurgitation  Will f/U annually   Lov 8/28/23  Reviewed note:   Plan:       Nonrheumatic mitral valve regurgitation   Echo 8/22/2022 normal LVEF, only mild mitral regurgitation   Echo done 6/2021 with preserved LVEF 60-65%, with mild-mod MR   echo in 11/2017 with mild MR, EF 55-60%  We will plan for repeat echo in August 2025 with no change in symptoms or murmur   Continue afterload reduction for now      Normal Stress echo in 8/2021       Essential hypertension with goal blood pressure less than 130/85   Controlled.  And she states this typically about 10 points lower at home.  She states home blood pressure is about 120/80.   On Hctz 25 and Losartan 100     Recall echo 8/22/22:    Left Ventricle: Normal left ventricular systolic function with a visually estimated EF of 60 - 65%.

## 2024-02-07 RX ORDER — SIMVASTATIN 40 MG
40 TABLET ORAL DAILY
Qty: 90 TABLET | Refills: 1 | Status: SHIPPED | OUTPATIENT
Start: 2024-02-07

## 2024-02-08 ENCOUNTER — NURSE ONLY (OUTPATIENT)
Age: 74
End: 2024-02-08

## 2024-02-08 DIAGNOSIS — D64.9 ANEMIA, UNSPECIFIED TYPE: ICD-10-CM

## 2024-02-08 DIAGNOSIS — E55.9 VITAMIN D DEFICIENCY: ICD-10-CM

## 2024-02-08 DIAGNOSIS — E83.52 HYPERCALCEMIA: ICD-10-CM

## 2024-02-08 LAB
25(OH)D3 SERPL-MCNC: 83 NG/ML (ref 30–100)
CALCIUM SERPL-MCNC: 9.7 MG/DL (ref 8.5–10.1)
ERYTHROCYTE [DISTWIDTH] IN BLOOD BY AUTOMATED COUNT: 12.8 % (ref 11.5–14.5)
HCT VFR BLD AUTO: 34.9 % (ref 35–47)
HGB BLD-MCNC: 11.3 G/DL (ref 11.5–16)
MCH RBC QN AUTO: 30.4 PG (ref 26–34)
MCHC RBC AUTO-ENTMCNC: 32.4 G/DL (ref 30–36.5)
MCV RBC AUTO: 93.8 FL (ref 80–99)
NRBC # BLD: 0 K/UL (ref 0–0.01)
NRBC BLD-RTO: 0 PER 100 WBC
PLATELET # BLD AUTO: 331 K/UL (ref 150–400)
PMV BLD AUTO: 11.1 FL (ref 8.9–12.9)
RBC # BLD AUTO: 3.72 M/UL (ref 3.8–5.2)
WBC # BLD AUTO: 4.5 K/UL (ref 3.6–11)

## 2024-02-09 LAB
CALCIUM SERPL-MCNC: 9.9 MG/DL (ref 8.5–10.1)
PTH-INTACT SERPL-MCNC: 32.2 PG/ML (ref 18.4–88)

## 2024-02-12 ENCOUNTER — OFFICE VISIT (OUTPATIENT)
Age: 74
End: 2024-02-12
Payer: MEDICARE

## 2024-02-12 ENCOUNTER — TRANSCRIBE ORDERS (OUTPATIENT)
Facility: HOSPITAL | Age: 74
End: 2024-02-12

## 2024-02-12 VITALS
BODY MASS INDEX: 30.37 KG/M2 | RESPIRATION RATE: 18 BRPM | SYSTOLIC BLOOD PRESSURE: 122 MMHG | HEART RATE: 72 BPM | OXYGEN SATURATION: 98 % | HEIGHT: 66 IN | WEIGHT: 189 LBS | TEMPERATURE: 97.6 F | DIASTOLIC BLOOD PRESSURE: 68 MMHG

## 2024-02-12 DIAGNOSIS — E53.8 B12 DEFICIENCY: ICD-10-CM

## 2024-02-12 DIAGNOSIS — R05.9 COUGH, UNSPECIFIED TYPE: ICD-10-CM

## 2024-02-12 DIAGNOSIS — J44.9 CHRONIC OBSTRUCTIVE PULMONARY DISEASE, UNSPECIFIED COPD TYPE (HCC): ICD-10-CM

## 2024-02-12 DIAGNOSIS — M85.89 OSTEOPENIA OF MULTIPLE SITES: ICD-10-CM

## 2024-02-12 DIAGNOSIS — I10 ESSENTIAL HYPERTENSION WITH GOAL BLOOD PRESSURE LESS THAN 130/85: Primary | ICD-10-CM

## 2024-02-12 DIAGNOSIS — G89.29 CHRONIC BILATERAL LOW BACK PAIN WITHOUT SCIATICA: ICD-10-CM

## 2024-02-12 DIAGNOSIS — M54.50 CHRONIC BILATERAL LOW BACK PAIN WITHOUT SCIATICA: ICD-10-CM

## 2024-02-12 DIAGNOSIS — D64.9 ANEMIA, UNSPECIFIED TYPE: ICD-10-CM

## 2024-02-12 DIAGNOSIS — R73.01 IFG (IMPAIRED FASTING GLUCOSE): ICD-10-CM

## 2024-02-12 DIAGNOSIS — Z12.31 VISIT FOR SCREENING MAMMOGRAM: Primary | ICD-10-CM

## 2024-02-12 PROCEDURE — G8484 FLU IMMUNIZE NO ADMIN: HCPCS | Performed by: INTERNAL MEDICINE

## 2024-02-12 PROCEDURE — 1036F TOBACCO NON-USER: CPT | Performed by: INTERNAL MEDICINE

## 2024-02-12 PROCEDURE — G8417 CALC BMI ABV UP PARAM F/U: HCPCS | Performed by: INTERNAL MEDICINE

## 2024-02-12 PROCEDURE — 3078F DIAST BP <80 MM HG: CPT | Performed by: INTERNAL MEDICINE

## 2024-02-12 PROCEDURE — 99214 OFFICE O/P EST MOD 30 MIN: CPT | Performed by: INTERNAL MEDICINE

## 2024-02-12 PROCEDURE — 3017F COLORECTAL CA SCREEN DOC REV: CPT | Performed by: INTERNAL MEDICINE

## 2024-02-12 PROCEDURE — 3074F SYST BP LT 130 MM HG: CPT | Performed by: INTERNAL MEDICINE

## 2024-02-12 PROCEDURE — G8399 PT W/DXA RESULTS DOCUMENT: HCPCS | Performed by: INTERNAL MEDICINE

## 2024-02-12 PROCEDURE — 1123F ACP DISCUSS/DSCN MKR DOCD: CPT | Performed by: INTERNAL MEDICINE

## 2024-02-12 PROCEDURE — 1090F PRES/ABSN URINE INCON ASSESS: CPT | Performed by: INTERNAL MEDICINE

## 2024-02-12 PROCEDURE — G8427 DOCREV CUR MEDS BY ELIG CLIN: HCPCS | Performed by: INTERNAL MEDICINE

## 2024-02-12 PROCEDURE — 3023F SPIROM DOC REV: CPT | Performed by: INTERNAL MEDICINE

## 2024-02-12 RX ORDER — TIZANIDINE 2 MG/1
2 TABLET ORAL 3 TIMES DAILY PRN
Qty: 30 TABLET | Refills: 0 | Status: SHIPPED | OUTPATIENT
Start: 2024-02-12

## 2024-02-12 RX ORDER — AMLODIPINE BESYLATE 2.5 MG/1
2.5 TABLET ORAL DAILY
COMMUNITY

## 2024-02-12 NOTE — PROGRESS NOTES
\"Have you been to the ER, urgent care clinic since your last visit?  Hospitalized since your last visit?\"    NO    “Have you seen or consulted any other health care providers outside of [facility] since your last visit?”    NO

## 2024-03-01 ENCOUNTER — HOSPITAL ENCOUNTER (OUTPATIENT)
Facility: HOSPITAL | Age: 74
End: 2024-03-01
Attending: INTERNAL MEDICINE
Payer: MEDICARE

## 2024-03-01 VITALS — HEIGHT: 66 IN | WEIGHT: 189 LBS | BODY MASS INDEX: 30.37 KG/M2

## 2024-03-01 DIAGNOSIS — Z12.31 VISIT FOR SCREENING MAMMOGRAM: ICD-10-CM

## 2024-03-01 PROCEDURE — 77063 BREAST TOMOSYNTHESIS BI: CPT

## 2024-03-22 RX ORDER — AMLODIPINE BESYLATE 2.5 MG/1
2.5 TABLET ORAL DAILY
Qty: 90 TABLET | Refills: 0 | Status: SHIPPED | OUTPATIENT
Start: 2024-03-22

## 2024-04-16 ENCOUNTER — HOSPITAL ENCOUNTER (EMERGENCY)
Facility: HOSPITAL | Age: 74
Discharge: HOME | End: 2024-04-16
Attending: EMERGENCY MEDICINE
Payer: MEDICARE

## 2024-04-16 VITALS
OXYGEN SATURATION: 99 % | HEART RATE: 66 BPM | BODY MASS INDEX: 30.37 KG/M2 | SYSTOLIC BLOOD PRESSURE: 145 MMHG | DIASTOLIC BLOOD PRESSURE: 76 MMHG | TEMPERATURE: 97.9 F | RESPIRATION RATE: 18 BRPM | HEIGHT: 66 IN | WEIGHT: 189 LBS

## 2024-04-16 DIAGNOSIS — R55 SYNCOPE, UNSPECIFIED SYNCOPE TYPE: Primary | ICD-10-CM

## 2024-04-16 LAB
ALBUMIN SERPL-MCNC: 4 G/DL (ref 3.5–5)
ALBUMIN/GLOB SERPL: 1.2 (ref 1.1–2.2)
ALP SERPL-CCNC: 79 U/L (ref 45–117)
ALT SERPL-CCNC: 24 U/L (ref 12–78)
ANION GAP SERPL CALC-SCNC: 6 MMOL/L (ref 5–15)
AST SERPL-CCNC: 18 U/L (ref 15–37)
BASOPHILS # BLD: 0 K/UL (ref 0–0.1)
BASOPHILS NFR BLD: 1 % (ref 0–1)
BILIRUB SERPL-MCNC: 0.3 MG/DL (ref 0.2–1)
BUN SERPL-MCNC: 19 MG/DL (ref 6–20)
BUN/CREAT SERPL: 18 (ref 12–20)
CALCIUM SERPL-MCNC: 10.2 MG/DL (ref 8.5–10.1)
CHLORIDE SERPL-SCNC: 106 MMOL/L (ref 97–108)
CO2 SERPL-SCNC: 27 MMOL/L (ref 21–32)
CREAT SERPL-MCNC: 1.04 MG/DL (ref 0.55–1.02)
DIFFERENTIAL METHOD BLD: ABNORMAL
EKG ATRIAL RATE: 60 BPM
EKG DIAGNOSIS: NORMAL
EKG P AXIS: 65 DEGREES
EKG P-R INTERVAL: 236 MS
EKG Q-T INTERVAL: 426 MS
EKG QRS DURATION: 94 MS
EKG QTC CALCULATION (BAZETT): 426 MS
EKG R AXIS: 1 DEGREES
EKG T AXIS: 47 DEGREES
EKG VENTRICULAR RATE: 60 BPM
EOSINOPHIL # BLD: 0.1 K/UL (ref 0–0.4)
EOSINOPHIL NFR BLD: 2 % (ref 0–7)
ERYTHROCYTE [DISTWIDTH] IN BLOOD BY AUTOMATED COUNT: 12.9 % (ref 11.5–14.5)
GLOBULIN SER CALC-MCNC: 3.3 G/DL (ref 2–4)
GLUCOSE SERPL-MCNC: 102 MG/DL (ref 65–100)
HCT VFR BLD AUTO: 34.9 % (ref 35–47)
HGB BLD-MCNC: 11.4 G/DL (ref 11.5–16)
IMM GRANULOCYTES # BLD AUTO: 0 K/UL (ref 0–0.04)
IMM GRANULOCYTES NFR BLD AUTO: 0 % (ref 0–0.5)
LYMPHOCYTES # BLD: 2.5 K/UL (ref 0.8–3.5)
LYMPHOCYTES NFR BLD: 43 % (ref 12–49)
MAGNESIUM SERPL-MCNC: 1.7 MG/DL (ref 1.6–2.4)
MCH RBC QN AUTO: 31 PG (ref 26–34)
MCHC RBC AUTO-ENTMCNC: 32.7 G/DL (ref 30–36.5)
MCV RBC AUTO: 94.8 FL (ref 80–99)
MONOCYTES # BLD: 0.4 K/UL (ref 0–1)
MONOCYTES NFR BLD: 6 % (ref 5–13)
NEUTS SEG # BLD: 2.8 K/UL (ref 1.8–8)
NEUTS SEG NFR BLD: 48 % (ref 32–75)
NRBC # BLD: 0 K/UL (ref 0–0.01)
NRBC BLD-RTO: 0 PER 100 WBC
PLATELET # BLD AUTO: 171 K/UL (ref 150–400)
PMV BLD AUTO: 11.1 FL (ref 8.9–12.9)
POTASSIUM SERPL-SCNC: 3.4 MMOL/L (ref 3.5–5.1)
PROT SERPL-MCNC: 7.3 G/DL (ref 6.4–8.2)
RBC # BLD AUTO: 3.68 M/UL (ref 3.8–5.2)
SODIUM SERPL-SCNC: 139 MMOL/L (ref 136–145)
TROPONIN I SERPL HS-MCNC: <4 NG/L (ref 0–51)
WBC # BLD AUTO: 5.9 K/UL (ref 3.6–11)

## 2024-04-16 PROCEDURE — 2580000003 HC RX 258: Performed by: EMERGENCY MEDICINE

## 2024-04-16 PROCEDURE — 84484 ASSAY OF TROPONIN QUANT: CPT

## 2024-04-16 PROCEDURE — 96360 HYDRATION IV INFUSION INIT: CPT

## 2024-04-16 PROCEDURE — 80053 COMPREHEN METABOLIC PANEL: CPT

## 2024-04-16 PROCEDURE — 85025 COMPLETE CBC W/AUTO DIFF WBC: CPT

## 2024-04-16 PROCEDURE — 99284 EMERGENCY DEPT VISIT MOD MDM: CPT

## 2024-04-16 PROCEDURE — 36415 COLL VENOUS BLD VENIPUNCTURE: CPT

## 2024-04-16 PROCEDURE — 83735 ASSAY OF MAGNESIUM: CPT

## 2024-04-16 PROCEDURE — 93005 ELECTROCARDIOGRAM TRACING: CPT | Performed by: EMERGENCY MEDICINE

## 2024-04-16 RX ORDER — 0.9 % SODIUM CHLORIDE 0.9 %
500 INTRAVENOUS SOLUTION INTRAVENOUS ONCE
Status: COMPLETED | OUTPATIENT
Start: 2024-04-16 | End: 2024-04-16

## 2024-04-16 RX ADMIN — SODIUM CHLORIDE 500 ML: 9 INJECTION, SOLUTION INTRAVENOUS at 04:27

## 2024-04-16 ASSESSMENT — LIFESTYLE VARIABLES
HOW MANY STANDARD DRINKS CONTAINING ALCOHOL DO YOU HAVE ON A TYPICAL DAY: PATIENT DOES NOT DRINK
HOW OFTEN DO YOU HAVE A DRINK CONTAINING ALCOHOL: NEVER

## 2024-04-16 ASSESSMENT — PAIN SCALES - GENERAL: PAINLEVEL_OUTOF10: 5

## 2024-04-16 ASSESSMENT — PAIN DESCRIPTION - PAIN TYPE: TYPE: ACUTE PAIN

## 2024-04-16 ASSESSMENT — PAIN - FUNCTIONAL ASSESSMENT: PAIN_FUNCTIONAL_ASSESSMENT: 0-10

## 2024-04-16 ASSESSMENT — PAIN DESCRIPTION - LOCATION: LOCATION: JAW

## 2024-04-16 ASSESSMENT — PAIN DESCRIPTION - ORIENTATION: ORIENTATION: LEFT

## 2024-04-16 NOTE — ED PROVIDER NOTES
STATUS UNKNOWN)         Family History:  Family History   Problem Relation Age of Onset    High Cholesterol Mother     Hypertension Mother     COPD Mother     Anemia Mother     Hypertension Sister     High Cholesterol Sister     High Blood Pressure Sister     Hypertension Father     Anemia Father     Arthritis Father     High Blood Pressure Sister        Social History:  Social History     Tobacco Use    Smoking status: Never     Passive exposure: Never    Smokeless tobacco: Never   Substance Use Topics    Alcohol use: No    Drug use: No       Allergies:  No Known Allergies    CURRENT MEDICATIONS      Discharge Medication List as of 4/16/2024  5:44 AM        CONTINUE these medications which have NOT CHANGED    Details   amLODIPine (NORVASC) 2.5 MG tablet TAKE 1 TABLET BY MOUTH DAILY, Disp-90 tablet, R-0Normal      tiZANidine (ZANAFLEX) 2 MG tablet Take 1 tablet by mouth 3 times daily as needed (pain), Disp-30 tablet, R-0Normal      simvastatin (ZOCOR) 40 MG tablet TAKE 1 TABLET BY MOUTH DAILY, Disp-90 tablet, R-1Normal      losartan (COZAAR) 100 MG tablet Take 1 tablet by mouth daily, Disp-90 tablet, R-1Normal      hydroCHLOROthiazide (HYDRODIURIL) 25 MG tablet Take 1 tablet by mouth daily, Disp-90 tablet, R-1Normal      albuterol sulfate HFA (VENTOLIN HFA) 108 (90 Base) MCG/ACT inhaler Inhale 2 puffs into the lungs 4 times daily as needed for Wheezing, Disp-54 g, R-1Normal      acetaminophen (TYLENOL) 650 MG extended release tablet Take by mouth every 8 hoursHistorical Med      vitamin D (CHOLECALCIFEROL) 25 MCG (1000 UT) TABS tablet Take by mouth dailyHistorical Med      latanoprost (XALATAN) 0.005 % ophthalmic solution INSTILL 1 DROP IN EACH EYE AT BEDTIMEHistorical Med      pantoprazole (PROTONIX) 40 MG tablet Take by mouth 2 times dailyHistorical Med      polyethylene glycol (GLYCOLAX) 17 GM/SCOOP powder Take by mouth as neededHistorical Med             SCREENINGS               No data recorded         PHYSICAL

## 2024-04-17 ASSESSMENT — ENCOUNTER SYMPTOMS: SHORTNESS OF BREATH: 0

## 2024-04-17 NOTE — PROGRESS NOTES
HISTORY OF PRESENT ILLNESS  Virgen Trejo is a 73 y.o. female.  HPI    This is an established visit completed with telemedicine was completed with video assist. The patient acknowledges and agrees to this method of visitation.      Last here 2/12/24. Pt is here for routine care.  After going to the emergency department earlier this week      Has a history of hypertension  BP at home 106/63 113/64 111/56  Pt is on losartan 100 mg, hctz 25 mg, norvasc 2.5 mg   Advised to decrease HCTZ to half as blood pressures have been on the lower side and she has been feeling a bit lightheaded upon standing sporadically  Recall pt had a cough on lisinopril. She was taking this medication for 20 years.     Wt today is lbs , lbs since lov 189  Discussed diet and wt/l    Reviewed labs   Ordered labs    She was in ER on 4/16/24 for syncope  Reviewed note:  Patient evaluated found to be in no acute cardiopulmonary distress. Here after syncopal episode, most likely vasovagal given report of abdominal cramping, could be orthostatic, will check orthostatic vital signs, EKG does not show any evidence of arrhythmia genic process, QRS and QTc are normal, no evidence of Brugada, no evidence of Oneyda-Parkinson-White. Doubt arrhythmia, will leave on cardiac monitor here, check labs, including CBC, CMP. Will give some IV fluids. Disposition pending laboratory workup and imaging     She reports she gone to the bathroom today she went to the emergency department she was sitting down had a lot of abdominal cramping felt lightheaded for last nauseated then she had a syncopal event she quickly woke up her  got her he was unable to drive so they called 911 she was brought to the emergency department reviewed their notes this appears to be a vasovagal event she had clear prodromal symptoms ER evaluation was unremarkable had minimally low potassium ultimately she is feeling much better since then  Does have a bruise on her left anterior wall

## 2024-04-19 ENCOUNTER — TELEMEDICINE (OUTPATIENT)
Age: 74
End: 2024-04-19
Payer: MEDICARE

## 2024-04-19 DIAGNOSIS — R42 DIZZINESS: ICD-10-CM

## 2024-04-19 DIAGNOSIS — E87.6 HYPOKALEMIA: ICD-10-CM

## 2024-04-19 DIAGNOSIS — I10 ESSENTIAL HYPERTENSION WITH GOAL BLOOD PRESSURE LESS THAN 130/85: Primary | ICD-10-CM

## 2024-04-19 DIAGNOSIS — D50.9 IRON DEFICIENCY ANEMIA, UNSPECIFIED IRON DEFICIENCY ANEMIA TYPE: ICD-10-CM

## 2024-04-19 DIAGNOSIS — T14.8XXA BRUISE: ICD-10-CM

## 2024-04-19 DIAGNOSIS — R26.89 BALANCE PROBLEM: ICD-10-CM

## 2024-04-19 PROCEDURE — 1090F PRES/ABSN URINE INCON ASSESS: CPT | Performed by: INTERNAL MEDICINE

## 2024-04-19 PROCEDURE — 3017F COLORECTAL CA SCREEN DOC REV: CPT | Performed by: INTERNAL MEDICINE

## 2024-04-19 PROCEDURE — 99214 OFFICE O/P EST MOD 30 MIN: CPT | Performed by: INTERNAL MEDICINE

## 2024-04-19 PROCEDURE — G8399 PT W/DXA RESULTS DOCUMENT: HCPCS | Performed by: INTERNAL MEDICINE

## 2024-04-19 PROCEDURE — G8427 DOCREV CUR MEDS BY ELIG CLIN: HCPCS | Performed by: INTERNAL MEDICINE

## 2024-04-19 PROCEDURE — 1123F ACP DISCUSS/DSCN MKR DOCD: CPT | Performed by: INTERNAL MEDICINE

## 2024-04-19 NOTE — PROGRESS NOTES
\"Have you been to the ER, urgent care clinic since your last visit?  Hospitalized since your last visit?\"    NO    “Have you seen or consulted any other health care providers outside of [facility] since your last visit?”    NO            Click Here for Release of Records Request

## 2024-04-26 ENCOUNTER — NURSE ONLY (OUTPATIENT)
Age: 74
End: 2024-04-26

## 2024-04-26 DIAGNOSIS — R73.01 IFG (IMPAIRED FASTING GLUCOSE): ICD-10-CM

## 2024-04-26 DIAGNOSIS — E53.8 B12 DEFICIENCY: ICD-10-CM

## 2024-04-26 DIAGNOSIS — I10 ESSENTIAL HYPERTENSION WITH GOAL BLOOD PRESSURE LESS THAN 130/85: ICD-10-CM

## 2024-04-26 DIAGNOSIS — D64.9 ANEMIA, UNSPECIFIED TYPE: ICD-10-CM

## 2024-04-26 LAB
ALBUMIN SERPL-MCNC: 4.3 G/DL (ref 3.5–5)
ALBUMIN/GLOB SERPL: 1.3 (ref 1.1–2.2)
ALP SERPL-CCNC: 79 U/L (ref 45–117)
ALT SERPL-CCNC: 19 U/L (ref 12–78)
ANION GAP SERPL CALC-SCNC: 9 MMOL/L (ref 5–15)
AST SERPL-CCNC: 17 U/L (ref 15–37)
BILIRUB SERPL-MCNC: 0.4 MG/DL (ref 0.2–1)
BUN SERPL-MCNC: 17 MG/DL (ref 6–20)
BUN/CREAT SERPL: 16 (ref 12–20)
CALCIUM SERPL-MCNC: 10 MG/DL (ref 8.5–10.1)
CHLORIDE SERPL-SCNC: 106 MMOL/L (ref 97–108)
CO2 SERPL-SCNC: 26 MMOL/L (ref 21–32)
CREAT SERPL-MCNC: 1.04 MG/DL (ref 0.55–1.02)
ERYTHROCYTE [DISTWIDTH] IN BLOOD BY AUTOMATED COUNT: 13.2 % (ref 11.5–14.5)
EST. AVERAGE GLUCOSE BLD GHB EST-MCNC: 123 MG/DL
FERRITIN SERPL-MCNC: 42 NG/ML (ref 8–252)
GLOBULIN SER CALC-MCNC: 3.2 G/DL (ref 2–4)
GLUCOSE SERPL-MCNC: 108 MG/DL (ref 65–100)
HBA1C MFR BLD: 5.9 % (ref 4–5.6)
HCT VFR BLD AUTO: 34.9 % (ref 35–47)
HGB BLD-MCNC: 11.3 G/DL (ref 11.5–16)
IRON SERPL-MCNC: 81 UG/DL (ref 35–150)
MCH RBC QN AUTO: 30.1 PG (ref 26–34)
MCHC RBC AUTO-ENTMCNC: 32.4 G/DL (ref 30–36.5)
MCV RBC AUTO: 93.1 FL (ref 80–99)
NRBC # BLD: 0 K/UL (ref 0–0.01)
NRBC BLD-RTO: 0 PER 100 WBC
PLATELET # BLD AUTO: 308 K/UL (ref 150–400)
PMV BLD AUTO: 11.1 FL (ref 8.9–12.9)
POTASSIUM SERPL-SCNC: 4.3 MMOL/L (ref 3.5–5.1)
PROT SERPL-MCNC: 7.5 G/DL (ref 6.4–8.2)
RBC # BLD AUTO: 3.75 M/UL (ref 3.8–5.2)
SODIUM SERPL-SCNC: 141 MMOL/L (ref 136–145)
VIT B12 SERPL-MCNC: 655 PG/ML (ref 193–986)
WBC # BLD AUTO: 5.5 K/UL (ref 3.6–11)

## 2024-04-29 DIAGNOSIS — R73.03 PREDIABETES: Primary | ICD-10-CM

## 2024-04-29 NOTE — RESULT ENCOUNTER NOTE
Called pt, verified two pt identifiers.   Informed pt minimal anemia, normal iron/b12. Will monitor   Stable prediabetes, diet for this.   Pt verifies an understanding, pt would like a referral to nutritionist. Informed pt of Tamiko vazquez.

## 2024-05-07 RX ORDER — HYDROCHLOROTHIAZIDE 25 MG/1
25 TABLET ORAL DAILY
Qty: 90 TABLET | Refills: 1 | Status: SHIPPED | OUTPATIENT
Start: 2024-05-07

## 2024-05-07 RX ORDER — LOSARTAN POTASSIUM 100 MG/1
100 TABLET ORAL DAILY
Qty: 90 TABLET | Refills: 1 | Status: SHIPPED | OUTPATIENT
Start: 2024-05-07

## 2024-06-09 RX ORDER — AMLODIPINE BESYLATE 2.5 MG/1
2.5 TABLET ORAL DAILY
Qty: 90 TABLET | Refills: 0 | Status: SHIPPED | OUTPATIENT
Start: 2024-06-09

## 2024-08-05 RX ORDER — SIMVASTATIN 40 MG
40 TABLET ORAL DAILY
Qty: 90 TABLET | Refills: 1 | Status: SHIPPED | OUTPATIENT
Start: 2024-08-05

## 2024-08-06 ENCOUNTER — OFFICE VISIT (OUTPATIENT)
Age: 74
End: 2024-08-06
Payer: MEDICARE

## 2024-08-06 VITALS
OXYGEN SATURATION: 100 % | BODY MASS INDEX: 30.53 KG/M2 | DIASTOLIC BLOOD PRESSURE: 74 MMHG | HEIGHT: 66 IN | SYSTOLIC BLOOD PRESSURE: 126 MMHG | HEART RATE: 63 BPM | WEIGHT: 190 LBS

## 2024-08-06 DIAGNOSIS — K21.00 GASTROESOPHAGEAL REFLUX DISEASE WITH ESOPHAGITIS WITHOUT HEMORRHAGE: ICD-10-CM

## 2024-08-06 DIAGNOSIS — I34.0 NONRHEUMATIC MITRAL VALVE REGURGITATION: Primary | ICD-10-CM

## 2024-08-06 DIAGNOSIS — E78.2 MIXED HYPERLIPIDEMIA: ICD-10-CM

## 2024-08-06 DIAGNOSIS — I10 ESSENTIAL (PRIMARY) HYPERTENSION: ICD-10-CM

## 2024-08-06 PROCEDURE — 99214 OFFICE O/P EST MOD 30 MIN: CPT | Performed by: INTERNAL MEDICINE

## 2024-08-06 PROCEDURE — G8427 DOCREV CUR MEDS BY ELIG CLIN: HCPCS | Performed by: INTERNAL MEDICINE

## 2024-08-06 PROCEDURE — 3078F DIAST BP <80 MM HG: CPT | Performed by: INTERNAL MEDICINE

## 2024-08-06 PROCEDURE — 1090F PRES/ABSN URINE INCON ASSESS: CPT | Performed by: INTERNAL MEDICINE

## 2024-08-06 PROCEDURE — G8417 CALC BMI ABV UP PARAM F/U: HCPCS | Performed by: INTERNAL MEDICINE

## 2024-08-06 PROCEDURE — 1123F ACP DISCUSS/DSCN MKR DOCD: CPT | Performed by: INTERNAL MEDICINE

## 2024-08-06 PROCEDURE — G8399 PT W/DXA RESULTS DOCUMENT: HCPCS | Performed by: INTERNAL MEDICINE

## 2024-08-06 PROCEDURE — 1036F TOBACCO NON-USER: CPT | Performed by: INTERNAL MEDICINE

## 2024-08-06 PROCEDURE — 93005 ELECTROCARDIOGRAM TRACING: CPT | Performed by: INTERNAL MEDICINE

## 2024-08-06 PROCEDURE — 3074F SYST BP LT 130 MM HG: CPT | Performed by: INTERNAL MEDICINE

## 2024-08-06 PROCEDURE — 3017F COLORECTAL CA SCREEN DOC REV: CPT | Performed by: INTERNAL MEDICINE

## 2024-08-06 PROCEDURE — 93010 ELECTROCARDIOGRAM REPORT: CPT | Performed by: INTERNAL MEDICINE

## 2024-08-06 ASSESSMENT — PATIENT HEALTH QUESTIONNAIRE - PHQ9
1. LITTLE INTEREST OR PLEASURE IN DOING THINGS: NOT AT ALL
SUM OF ALL RESPONSES TO PHQ QUESTIONS 1-9: 0
SUM OF ALL RESPONSES TO PHQ9 QUESTIONS 1 & 2: 0
SUM OF ALL RESPONSES TO PHQ QUESTIONS 1-9: 0
2. FEELING DOWN, DEPRESSED OR HOPELESS: NOT AT ALL

## 2024-08-06 NOTE — PROGRESS NOTES
[address] [place] [address] [phone] [address] [place] [address]     Subjective:        Virgen Trejo is a 73 y.o. female is here for routine f/u.  The patient denies chest pain/ shortness of breath, orthopnea, PND, LE edema, palpitations, syncope, presyncope or fatigue.  She a while back had some indigestion when she had come off of her acid blocking pill but after getting back on it that has resolved.  It is always food related.  She has not have any exertional chest discomfort.    Patient Active Problem List    Diagnosis Date Noted    Chronic obstructive pulmonary disease, unspecified 02/12/2024    Nonrheumatic mitral valve regurgitation 07/01/2021    Essential hypertension with goal blood pressure less than 130/85 08/03/2016    Pure hypercholesterolemia 08/03/2016    Osteopenia 03/23/2012    Constipation 08/05/2011    Vertigo 08/05/2011    Glaucoma 08/05/2011    Esophagitis 03/22/2011      Kaitlyn Mendoza MD  Past Medical History:   Diagnosis Date    Arthritis     narrowing of spine    Esophagitis, unspecified     GERD (gastroesophageal reflux disease)     Headache(784.0)     Hemorrhoids     Hx of vertigo     Mitral valve regurgitation     Obesity     Osteoarthritis     Other and unspecified hyperlipidemia     Ulcer     Unspecified essential hypertension       Past Surgical History:   Procedure Laterality Date    BREAST BIOPSY Left 1980s    benign cyst    BREAST SURGERY      COLONOSCOPY N/A 02/28/2018    COLONOSCOPY performed by Cheo Knapp MD at hospitals ENDOSCOPY    EYE SURGERY      HEMORRHOID SURGERY      HYSTERECTOMY (CERVIX STATUS UNKNOWN)       No Known Allergies   Family History   Problem Relation Age of Onset    High Cholesterol Mother     Hypertension Mother     COPD Mother     Anemia Mother     Hypertension Sister     High Cholesterol Sister     High Blood Pressure Sister     Hypertension Father     Anemia Father     Arthritis Father     High

## 2024-08-09 SDOH — HEALTH STABILITY: PHYSICAL HEALTH: ON AVERAGE, HOW MANY MINUTES DO YOU ENGAGE IN EXERCISE AT THIS LEVEL?: 110 MIN

## 2024-08-09 SDOH — HEALTH STABILITY: PHYSICAL HEALTH: ON AVERAGE, HOW MANY DAYS PER WEEK DO YOU ENGAGE IN MODERATE TO STRENUOUS EXERCISE (LIKE A BRISK WALK)?: 2 DAYS

## 2024-08-09 ASSESSMENT — PATIENT HEALTH QUESTIONNAIRE - PHQ9
1. LITTLE INTEREST OR PLEASURE IN DOING THINGS: NOT AT ALL
SUM OF ALL RESPONSES TO PHQ9 QUESTIONS 1 & 2: 0
2. FEELING DOWN, DEPRESSED OR HOPELESS: NOT AT ALL
SUM OF ALL RESPONSES TO PHQ QUESTIONS 1-9: 0

## 2024-08-09 ASSESSMENT — LIFESTYLE VARIABLES
HOW MANY STANDARD DRINKS CONTAINING ALCOHOL DO YOU HAVE ON A TYPICAL DAY: 0
HOW OFTEN DO YOU HAVE A DRINK CONTAINING ALCOHOL: NEVER
HOW OFTEN DO YOU HAVE SIX OR MORE DRINKS ON ONE OCCASION: 1
HOW OFTEN DO YOU HAVE A DRINK CONTAINING ALCOHOL: 1

## 2024-08-10 NOTE — PATIENT INSTRUCTIONS
Learning About Being Active as an Older Adult  Why is being active important as you get older?     Being active is one of the best things you can do for your health. And it's never too late to start. Being active--or getting active, if you aren't already--has definite benefits. It can:  Give you more energy,  Keep your mind sharp.  Improve balance to reduce your risk of falls.  Help you manage chronic illness with fewer medicines.  No matter how old you are, how fit you are, or what health problems you have, there is a form of activity that will work for you. And the more physical activity you can do, the better your overall health will be.  What kinds of activity can help you stay healthy?  Being more active will make your daily activities easier. Physical activity includes planned exercise and things you do in daily life. There are four types of activity:  Aerobic.  Doing aerobic activity makes your heart and lungs strong.  Includes walking, dancing, and gardening.  Aim for at least 2½ hours spread throughout the week.  It improves your energy and can help you sleep better.  Muscle-strengthening.  This type of activity can help maintain muscle and strengthen bones.  Includes climbing stairs, using resistance bands, and lifting or carrying heavy loads.  Aim for at least twice a week.  It can help protect the knees and other joints.  Stretching.  Stretching gives you better range of motion in joints and muscles.  Includes upper arm stretches, calf stretches, and gentle yoga.  Aim for at least twice a week, preferably after your muscles are warmed up from other activities.  It can help you function better in daily life.  Balancing.  This helps you stay coordinated and have good posture.  Includes heel-to-toe walking, burton chi, and certain types of yoga.  Aim for at least 3 days a week.  It can reduce your risk of falling.  Even if you have a hard time meeting the recommendations, it's better to be more active

## 2024-08-12 ENCOUNTER — OFFICE VISIT (OUTPATIENT)
Age: 74
End: 2024-08-12
Payer: MEDICARE

## 2024-08-12 VITALS
HEART RATE: 83 BPM | OXYGEN SATURATION: 99 % | BODY MASS INDEX: 30.7 KG/M2 | TEMPERATURE: 98.4 F | WEIGHT: 191 LBS | HEIGHT: 66 IN | RESPIRATION RATE: 18 BRPM | DIASTOLIC BLOOD PRESSURE: 72 MMHG | SYSTOLIC BLOOD PRESSURE: 131 MMHG

## 2024-08-12 DIAGNOSIS — B37.9 CANDIDA INFECTION: ICD-10-CM

## 2024-08-12 DIAGNOSIS — Z00.00 MEDICARE ANNUAL WELLNESS VISIT, SUBSEQUENT: ICD-10-CM

## 2024-08-12 DIAGNOSIS — R73.01 IFG (IMPAIRED FASTING GLUCOSE): ICD-10-CM

## 2024-08-12 DIAGNOSIS — D50.9 IRON DEFICIENCY ANEMIA, UNSPECIFIED IRON DEFICIENCY ANEMIA TYPE: ICD-10-CM

## 2024-08-12 DIAGNOSIS — M85.89 OSTEOPENIA OF MULTIPLE SITES: ICD-10-CM

## 2024-08-12 DIAGNOSIS — E78.00 PURE HYPERCHOLESTEROLEMIA: ICD-10-CM

## 2024-08-12 DIAGNOSIS — J44.9 CHRONIC OBSTRUCTIVE PULMONARY DISEASE, UNSPECIFIED COPD TYPE (HCC): ICD-10-CM

## 2024-08-12 DIAGNOSIS — I10 ESSENTIAL HYPERTENSION WITH GOAL BLOOD PRESSURE LESS THAN 130/85: Primary | ICD-10-CM

## 2024-08-12 PROCEDURE — 3075F SYST BP GE 130 - 139MM HG: CPT | Performed by: INTERNAL MEDICINE

## 2024-08-12 PROCEDURE — G8417 CALC BMI ABV UP PARAM F/U: HCPCS | Performed by: INTERNAL MEDICINE

## 2024-08-12 PROCEDURE — 99214 OFFICE O/P EST MOD 30 MIN: CPT | Performed by: INTERNAL MEDICINE

## 2024-08-12 PROCEDURE — 3023F SPIROM DOC REV: CPT | Performed by: INTERNAL MEDICINE

## 2024-08-12 PROCEDURE — G0439 PPPS, SUBSEQ VISIT: HCPCS | Performed by: INTERNAL MEDICINE

## 2024-08-12 PROCEDURE — G8427 DOCREV CUR MEDS BY ELIG CLIN: HCPCS | Performed by: INTERNAL MEDICINE

## 2024-08-12 PROCEDURE — G8399 PT W/DXA RESULTS DOCUMENT: HCPCS | Performed by: INTERNAL MEDICINE

## 2024-08-12 PROCEDURE — 3017F COLORECTAL CA SCREEN DOC REV: CPT | Performed by: INTERNAL MEDICINE

## 2024-08-12 PROCEDURE — 1090F PRES/ABSN URINE INCON ASSESS: CPT | Performed by: INTERNAL MEDICINE

## 2024-08-12 PROCEDURE — 3078F DIAST BP <80 MM HG: CPT | Performed by: INTERNAL MEDICINE

## 2024-08-12 PROCEDURE — 1036F TOBACCO NON-USER: CPT | Performed by: INTERNAL MEDICINE

## 2024-08-12 PROCEDURE — 1123F ACP DISCUSS/DSCN MKR DOCD: CPT | Performed by: INTERNAL MEDICINE

## 2024-08-12 RX ORDER — KETOCONAZOLE 20 MG/G
CREAM TOPICAL
Qty: 60 G | Refills: 1 | Status: SHIPPED | OUTPATIENT
Start: 2024-08-12

## 2024-08-12 RX ORDER — NYSTATIN 100000 [USP'U]/G
POWDER TOPICAL
Qty: 60 G | Refills: 1 | Status: SHIPPED | OUTPATIENT
Start: 2024-08-12

## 2024-08-12 ASSESSMENT — ENCOUNTER SYMPTOMS: SHORTNESS OF BREATH: 0

## 2024-08-12 NOTE — PROGRESS NOTES
Medicare Annual Wellness Visit    Virgen Trejo is here for Hypertension    Assessment & Plan   Essential hypertension with goal blood pressure less than 130/85  -     Lipid Panel; Future  -     Basic Metabolic Panel; Future  -     CBC; Future  -     TSH; Future  Osteopenia of multiple sites  -     Lipid Panel; Future  -     Basic Metabolic Panel; Future  -     CBC; Future  -     TSH; Future  Pure hypercholesterolemia  -     Lipid Panel; Future  -     Basic Metabolic Panel; Future  -     CBC; Future  -     TSH; Future  IFG (impaired fasting glucose)  -     Lipid Panel; Future  -     Basic Metabolic Panel; Future  -     CBC; Future  -     TSH; Future  Medicare annual wellness visit, subsequent  Chronic obstructive pulmonary disease, unspecified COPD type (HCC)  Iron deficiency anemia, unspecified iron deficiency anemia type  Candida infection     Recommendations for Preventive Services Due: see orders and patient instructions/AVS.  Recommended screening schedule for the next 5-10 years is provided to the patient in written form: see Patient Instructions/AVS.     Return in about 6 months (around 2/12/2025).     Subjective       Patient's complete Health Risk Assessment and screening values have been reviewed and are found in Flowsheets. The following problems were reviewed today and where indicated follow up appointments were made and/or referrals ordered.    Positive Risk Factor Screenings with Interventions:                Inactivity:  On average, how many days per week do you engage in moderate to strenuous exercise (like a brisk walk)?: 2 days (!) Abnormal  On average, how many minutes do you engage in exercise at this level?: 110 min  Interventions:  Increase walking     Poor Eating Habits/Diet:  Do you eat balanced/healthy meals regularly?: (!) No  Interventions:  Discussed healthy changes    Abnormal BMI (obese):  Body mass index is 30.83 kg/m². (!) Abnormal  Interventions:  low carbohydrate diet           
\"Have you been to the ER, urgent care clinic since your last visit?  Hospitalized since your last visit?\"    NO    “Have you seen or consulted any other health care providers outside of [facility] since your last visit?”    NO            Click Here for Release of Records Request  
documented by the scribe. The services provided are my own. The documentation is accurate.

## 2024-08-23 ENCOUNTER — LAB (OUTPATIENT)
Age: 74
End: 2024-08-23

## 2024-08-23 DIAGNOSIS — E78.00 PURE HYPERCHOLESTEROLEMIA: ICD-10-CM

## 2024-08-23 DIAGNOSIS — R73.01 IFG (IMPAIRED FASTING GLUCOSE): ICD-10-CM

## 2024-08-23 DIAGNOSIS — M85.89 OSTEOPENIA OF MULTIPLE SITES: ICD-10-CM

## 2024-08-23 DIAGNOSIS — I10 ESSENTIAL HYPERTENSION WITH GOAL BLOOD PRESSURE LESS THAN 130/85: ICD-10-CM

## 2024-08-23 LAB
ANION GAP SERPL CALC-SCNC: 3 MMOL/L (ref 5–15)
BUN SERPL-MCNC: 14 MG/DL (ref 6–20)
BUN/CREAT SERPL: 15 (ref 12–20)
CALCIUM SERPL-MCNC: 10 MG/DL (ref 8.5–10.1)
CHLORIDE SERPL-SCNC: 105 MMOL/L (ref 97–108)
CHOLEST SERPL-MCNC: 150 MG/DL
CO2 SERPL-SCNC: 32 MMOL/L (ref 21–32)
CREAT SERPL-MCNC: 0.95 MG/DL (ref 0.55–1.02)
ERYTHROCYTE [DISTWIDTH] IN BLOOD BY AUTOMATED COUNT: 13.2 % (ref 11.5–14.5)
GLUCOSE SERPL-MCNC: 94 MG/DL (ref 65–100)
HCT VFR BLD AUTO: 34.6 % (ref 35–47)
HDLC SERPL-MCNC: 69 MG/DL
HDLC SERPL: 2.2 (ref 0–5)
HGB BLD-MCNC: 11.3 G/DL (ref 11.5–16)
LDLC SERPL CALC-MCNC: 67.8 MG/DL (ref 0–100)
MCH RBC QN AUTO: 30.4 PG (ref 26–34)
MCHC RBC AUTO-ENTMCNC: 32.7 G/DL (ref 30–36.5)
MCV RBC AUTO: 93 FL (ref 80–99)
NRBC # BLD: 0 K/UL (ref 0–0.01)
NRBC BLD-RTO: 0 PER 100 WBC
PLATELET # BLD AUTO: 312 K/UL (ref 150–400)
PMV BLD AUTO: 10.9 FL (ref 8.9–12.9)
POTASSIUM SERPL-SCNC: 4 MMOL/L (ref 3.5–5.1)
RBC # BLD AUTO: 3.72 M/UL (ref 3.8–5.2)
SODIUM SERPL-SCNC: 140 MMOL/L (ref 136–145)
TRIGL SERPL-MCNC: 66 MG/DL
TSH SERPL DL<=0.05 MIU/L-ACNC: 2.15 UIU/ML (ref 0.36–3.74)
VLDLC SERPL CALC-MCNC: 13.2 MG/DL
WBC # BLD AUTO: 4.4 K/UL (ref 3.6–11)

## 2024-08-26 DIAGNOSIS — D50.8 OTHER IRON DEFICIENCY ANEMIA: Primary | ICD-10-CM

## 2024-09-24 ENCOUNTER — TELEPHONE (OUTPATIENT)
Age: 74
End: 2024-09-24

## 2024-09-24 NOTE — TELEPHONE ENCOUNTER
Sent O/N, lab results, demo, and referral req to fax # provided. Scanning completed fax to chart.

## 2024-09-24 NOTE — TELEPHONE ENCOUNTER
Pt has referral to:  [facility]  [address]  [facility] [address]   Phone:   [phone]     Fax:   [phone]          States specialist needs most recent:  Demographics  Referral   Office note  Recent lab work      Cannot schedule appt until received.    Fax please

## 2024-09-27 RX ORDER — AMLODIPINE BESYLATE 2.5 MG/1
2.5 TABLET ORAL DAILY
Qty: 90 TABLET | Refills: 0 | Status: SHIPPED | OUTPATIENT
Start: 2024-09-27

## 2024-10-24 ENCOUNTER — TELEPHONE (OUTPATIENT)
Age: 74
End: 2024-10-24

## 2024-10-24 NOTE — TELEPHONE ENCOUNTER
----- Message from Aditya CONROY sent at 10/24/2024  3:27 PM EDT -----  Regarding: [facility] Appointment Request  [facility] Appointment Request    Patient needs appointment for [facility] Appointment Type: Existing Condition Follow Up.    Patient Requested Dates(s): any day  Patient Requested Time: any time  Provider Name: Kaitlyn Mendoza MD    Reason for Appointment Request: Established Patient - No appointments available during search. Patient was diagnosed with bronchitis and respiratory infection and ants to have a follow up after 10 days but there are no available appointments.  --------------------------------------------------------------------------------------------------------------------------    Relationship to Patient: Self     Call Back Information: OK to leave message on voicemail  Preferred Call Back Number:  [phone]

## 2024-10-29 NOTE — PROGRESS NOTES
HISTORY OF PRESENT ILLNESS  Virgen Trejo is a 73 y.o. female.  HPI  Last here 8/12/24. Pt is here for routine care.     She was seen at  10/21/24 for cough, was given amoxicillin and tessalon perles. She notes an improvement in her symptoms Friday. She had symptoms [place] 10/20/24 and went Monday. She had a home Covid test that day that was negative per the  report. She has not been using any inhalers. She notes her symptoms have mostly improved, she used robitussin and this helped her cough. She was using flonase but recently ran out      Has a history of hypertension  BP today is 113/70  Pt is on losartan 100 mg, hctz 25 mg--now taking half tablet since last office visit due to lightheadedness, norvasc 2.5 mg   Recall pt had a cough on lisinopril. She was taking this medication for 20 years.     Wt today is 189 lbs, stable since lov   Discussed diet and wt/l     Reviewed labs   Ordered labs     She was in ER on 4/16/24 for syncope   Ordered MRI brain ultimately patient felt better and did not pursue this     Pt follows with Dr. Knapp (GI) for nausea and hx diarrhea  Pt is taking protonix  once daily previously twice daily  Previously took amitiza prn but no longer on this, had hemorrhoid surgery.   Lov CT abd pelv 7/1/22, abnormal      Pt sees Dr. Brewer (cardio) for mitral valve regurgitation  Will f/U annually   Lov 8/6/24  plan for repeat echo in August 2025, same day as follow-up     Recall echo 8/22/22:    Left Ventricle: Normal left ventricular systolic function with a visually estimated EF of 60 - 65%. Left ventricle size is normal. Normal wall thickness. Normal wall motion. Normal diastolic function.    Mitral Valve: Mild regurgitation.        Pt has a hx of chronic recurrent cough x 9/2017  She has seen Dr. Myers (ENT) about this in 2019 and is currently following with GI (see above)  She takes zyrtec in AM, Claritin at night, and flonase which improved her symptoms in the past  She has advair

## 2024-11-05 ENCOUNTER — OFFICE VISIT (OUTPATIENT)
Age: 74
End: 2024-11-05
Payer: MEDICARE

## 2024-11-05 VITALS
WEIGHT: 189 LBS | HEART RATE: 73 BPM | BODY MASS INDEX: 30.37 KG/M2 | TEMPERATURE: 98.3 F | DIASTOLIC BLOOD PRESSURE: 70 MMHG | SYSTOLIC BLOOD PRESSURE: 113 MMHG | RESPIRATION RATE: 18 BRPM | HEIGHT: 66 IN | OXYGEN SATURATION: 100 %

## 2024-11-05 DIAGNOSIS — I10 ESSENTIAL HYPERTENSION WITH GOAL BLOOD PRESSURE LESS THAN 130/85: ICD-10-CM

## 2024-11-05 DIAGNOSIS — J44.9 CHRONIC OBSTRUCTIVE PULMONARY DISEASE, UNSPECIFIED COPD TYPE (HCC): ICD-10-CM

## 2024-11-05 DIAGNOSIS — J40 BRONCHITIS: Primary | ICD-10-CM

## 2024-11-05 PROCEDURE — 99213 OFFICE O/P EST LOW 20 MIN: CPT | Performed by: INTERNAL MEDICINE

## 2024-11-05 RX ORDER — BENZONATATE 100 MG/1
100 CAPSULE ORAL 3 TIMES DAILY PRN
COMMUNITY
Start: 2024-10-21

## 2024-11-05 RX ORDER — FLUTICASONE PROPIONATE 50 MCG
1 SPRAY, SUSPENSION (ML) NASAL DAILY
Qty: 32 G | Refills: 1 | Status: SHIPPED | OUTPATIENT
Start: 2024-11-05

## 2024-11-05 RX ORDER — AMOXICILLIN 500 MG/1
500 CAPSULE ORAL 3 TIMES DAILY
COMMUNITY
Start: 2024-10-21

## 2024-11-05 RX ORDER — LOSARTAN POTASSIUM 100 MG/1
100 TABLET ORAL DAILY
Qty: 90 TABLET | Refills: 1 | Status: SHIPPED | OUTPATIENT
Start: 2024-11-05 | End: 2024-11-06

## 2024-11-05 ASSESSMENT — PATIENT HEALTH QUESTIONNAIRE - PHQ9
2. FEELING DOWN, DEPRESSED OR HOPELESS: NOT AT ALL
SUM OF ALL RESPONSES TO PHQ9 QUESTIONS 1 & 2: 0
SUM OF ALL RESPONSES TO PHQ QUESTIONS 1-9: 0
1. LITTLE INTEREST OR PLEASURE IN DOING THINGS: NOT AT ALL

## 2024-11-05 NOTE — PROGRESS NOTES
\"Have you been to the ER, urgent care clinic since your last visit?  Hospitalized since your last visit?\"    YES - When: approximately 2  weeks ago.  Where and Why: [facility] for URI/ Bronch[facility].    “Have you seen or consulted any other health care providers outside our system since your last visit?”    YES - When: approximately 2  weeks ago.  Where and Why: [facility]

## 2024-11-06 RX ORDER — LOSARTAN POTASSIUM 100 MG/1
100 TABLET ORAL DAILY
Qty: 90 TABLET | Refills: 1 | Status: SHIPPED | OUTPATIENT
Start: 2024-11-06

## 2024-11-06 RX ORDER — HYDROCHLOROTHIAZIDE 25 MG/1
12.5 TABLET ORAL DAILY
Qty: 45 TABLET | Refills: 1 | Status: SHIPPED | OUTPATIENT
Start: 2024-11-06

## 2024-11-13 RX ORDER — FLUTICASONE PROPIONATE AND SALMETEROL 250; 50 UG/1; UG/1
1 POWDER RESPIRATORY (INHALATION) EVERY 12 HOURS
Qty: 60 EACH | Refills: 3 | Status: SHIPPED | OUTPATIENT
Start: 2024-11-13

## 2024-11-13 NOTE — TELEPHONE ENCOUNTER
Future Appointments:  Future Appointments   Date Time Provider Department Center   2/17/2025 11:15 AM Kaitlyn Mendoza MD [facility] DEP   8/11/2025 10:00 AM [facility] E[facility] 2 [facility] AMB   8/11/2025 10:40 AM Randal Brewer MD [facility] BS AMB        Last Appointment With Me:  11/5/2024     Requested Prescriptions     Pending Prescriptions Disp Refills    fluticasone-salmeterol (ADVAIR DISKUS) 250-50 MCG/ACT AEPB diskus inhaler 60 each 3     Sig: Inhale 1 puff into the lungs in the morning and 1 puff in the evening.

## 2024-11-13 NOTE — TELEPHONE ENCOUNTER
Advair Inhaler 250/50      [id number] - [place], [place] - [address] - P [phone] - F [phone]      10-Dec-2019 16:07

## 2024-12-18 ENCOUNTER — TELEPHONE (OUTPATIENT)
Age: 74
End: 2024-12-18

## 2024-12-18 NOTE — TELEPHONE ENCOUNTER
Pt states that she went to specialist for her iron.        Pt can not read the results from My Chart/she doesn't understand and needs to.      Please call to go over these results with patient.

## 2024-12-27 NOTE — TELEPHONE ENCOUNTER
Called, Spoke with Pt  Received two pt identifiers  Pt states went to see Dr. Adams office but saw a different provider there  Pt states they took multiple tubes of blood but was not told about her results or what else to do  Advised pt we do not have those notes and cannot see the labs   Advised pt will request records to be faxed to pod fax and will notify Dr. Mendoza to review to see if anything else is needed  Awaiting fax  Pt verbalizes understanding of the instructions and has no further questions at this time.

## 2025-01-02 NOTE — TELEPHONE ENCOUNTER
Called, Spoke with Pt  Received two pt identifiers  Pt informed per Dr. Mendoza follow up with Hematology as discussed  Pt verbalizes understanding of the instructions and has no further questions at this time.

## 2025-01-06 RX ORDER — AMLODIPINE BESYLATE 2.5 MG/1
2.5 TABLET ORAL DAILY
Qty: 90 TABLET | Refills: 0 | Status: SHIPPED | OUTPATIENT
Start: 2025-01-06

## 2025-01-08 NOTE — PATIENT INSTRUCTIONS
Advance Directives: Care Instructions  Overview  An advance directive is a legal way to state your wishes at the end of your life. It tells your family and your doctor what to do if you can't say what you want. There are two main types of advance directives. You can change them any time your wishes change. Living will. This form tells your family and your doctor your wishes about life support and other treatment. The form is also called a declaration. Medical power of . This form lets you name a person to make treatment decisions for you when you can't speak for yourself. This person is called a health care agent (health care proxy, health care surrogate). The form is also called a durable power of  for health care. If you do not have an advance directive, decisions about your medical care may be made by a family member, or by a doctor or a  who doesn't know you. It may help to think of an advance directive as a gift to the people who care for you. If you have one, they won't have to make tough decisions by themselves. For more information, including forms for your state, see the [address] [place] [address] website (www.caringinfo.org/planning/advance-directives/). Follow-up care is a key part of your treatment and safety. Be sure to make and go to all appointments, and call your doctor if you are having problems. It's also a good idea to know your test results and keep a list of the medicines you take. What should you include in an advance directive? Many states have a unique advance directive form. (It may ask you to address specific issues.) Or you might use a universal form that's approved by many states. If your form doesn't tell you what to address, it may be hard to know what to include in your advance directive. Use the questions below to help you get started. Who do you want to make decisions about your medical care if you are not able to?   What life-support measures do you want if you Detail Level: Generalized Instructions: This plan will send the code FBSD to the PM system.  DO NOT or CHANGE the price. Price (Do Not Change): 0.00

## 2025-02-02 RX ORDER — SIMVASTATIN 40 MG
40 TABLET ORAL DAILY
Qty: 90 TABLET | Refills: 1 | Status: SHIPPED | OUTPATIENT
Start: 2025-02-02

## 2025-02-04 ENCOUNTER — TRANSCRIBE ORDERS (OUTPATIENT)
Facility: HOSPITAL | Age: 75
End: 2025-02-04

## 2025-02-04 DIAGNOSIS — Z12.31 VISIT FOR SCREENING MAMMOGRAM: Primary | ICD-10-CM

## 2025-02-05 ENCOUNTER — TELEPHONE (OUTPATIENT)
Age: 75
End: 2025-02-05

## 2025-02-05 NOTE — TELEPHONE ENCOUNTER
Patient call received. Patient has been experiencing chest discomfort/pain in the chest under the rib chest on left side, stated. \"In the beginning I though it was indigestion but I take Protonix daily\".    Ms Trejo denied SOB, fatigue, weakness, sweating with BP normal around 120/60 and HR between 59-60 stated. \"I think that I panic, since a friend of mine  from a heart attack lately\".  I explained to ms Trejo that  her pain can be due to different reasons, muscle skeletal, GERD and it will be hard to say if its related to the heart. Also explained that symptoms of GERD can be easily mistaken with heart symptoms.   Recommendations given to call GI physician for further recommendations, go to the ED if chest pain continue or/ and develop new symptoms. Message sent to dr. Brewer for further recommendations.

## 2025-02-07 NOTE — TELEPHONE ENCOUNTER
As always, if any chest pain or shortness of breath not relieved by rest within 10 minutes, she needs to go to the emergency room by calling 911.  Schedule follow-up in the near future for further evaluation.    Future Appointments   Date Time Provider Department Center   2/17/2025 11:15 AM Kaitlyn Mendoza MD [facility]   4/28/2025  1:00 PM [facility] 1 [facility] [place] Dx   8/11/2025 10:00 AM [facility] ECHO 2 [facility] AMB   8/11/2025 10:40 AM Randal Brewer MD [facility] BS AMB

## 2025-02-10 NOTE — PROGRESS NOTES
HISTORY OF PRESENT ILLNESS  Virgen Trejo is a 74 y.o. female.  HPI  Last here 24. Pt is here for routine care.      She states that she has been having what feels like spasming pain in her L chest wall/breast area, she has mammogram scheduled . Denies sob, cp with exertion, nausea, vomiting. She states it happens daily, happens for 1-2 seconds at a time with positional changes. She has been taking tylenol for the pain. She is not tender to palpation, has pain with turning, lifting. Will get L rib series and rx tixanidine     Reviewed call to cardio 25  Patient call received. Patient has been experiencing chest discomfort/pain in the chest under the rib chest on left side, stated. \"In the beginning I though it was indigestion but I take Protonix daily\".     Ms Trejo denied SOB, fatigue, weakness, sweating with BP normal around 120/60 and HR between 59-60 stated. \"I think that I panic, since a friend of mine  from a heart attack lately\".  I explained to ms Trejo that  her pain can be due to different reasons, muscle skeletal, GERD and it will be hard to say if its related to the heart. Also explained that symptoms of GERD can be easily mistaken with heart symptoms.   Recommendations given to call GI physician for further recommendations, go to the ED if chest pain continue or/ and develop new symptoms. Message sent to dr. Brewer for further recommendations.    She will be in [place] for the month of march helping her daughter after c section with her 5th child     Has a history of hypertension  BP today is 123/70  BP at home: 120s/60s  Pt is on losartan 100 mg, hctz 12.5 mg (half a 25mg tablet)  No longer on norvasc 2.5mg daily   Recall pt had a cough on lisinopril. She was taking this medication for 20 years     Wt today is 192 lbs, lov 189 lbs, stable since lov   Discussed diet and wt/l     Reviewed labs   Ordered labs     She was in ER on 24 for syncope   Ordered MRI brain ultimately

## 2025-02-14 SDOH — ECONOMIC STABILITY: FOOD INSECURITY: WITHIN THE PAST 12 MONTHS, YOU WORRIED THAT YOUR FOOD WOULD RUN OUT BEFORE YOU GOT MONEY TO BUY MORE.: NEVER TRUE

## 2025-02-14 SDOH — ECONOMIC STABILITY: INCOME INSECURITY: IN THE LAST 12 MONTHS, WAS THERE A TIME WHEN YOU WERE NOT ABLE TO PAY THE MORTGAGE OR RENT ON TIME?: NO

## 2025-02-14 SDOH — ECONOMIC STABILITY: FOOD INSECURITY: WITHIN THE PAST 12 MONTHS, THE FOOD YOU BOUGHT JUST DIDN'T LAST AND YOU DIDN'T HAVE MONEY TO GET MORE.: NEVER TRUE

## 2025-02-14 SDOH — ECONOMIC STABILITY: TRANSPORTATION INSECURITY
IN THE PAST 12 MONTHS, HAS LACK OF TRANSPORTATION KEPT YOU FROM MEETINGS, WORK, OR FROM GETTING THINGS NEEDED FOR DAILY LIVING?: NO

## 2025-02-14 SDOH — ECONOMIC STABILITY: TRANSPORTATION INSECURITY
IN THE PAST 12 MONTHS, HAS THE LACK OF TRANSPORTATION KEPT YOU FROM MEDICAL APPOINTMENTS OR FROM GETTING MEDICATIONS?: NO

## 2025-02-15 PROBLEM — R05.3 CHRONIC COUGH: Status: ACTIVE | Noted: 2025-02-15

## 2025-02-15 PROBLEM — R73.01 IFG (IMPAIRED FASTING GLUCOSE): Status: ACTIVE | Noted: 2025-02-15

## 2025-02-17 ENCOUNTER — OFFICE VISIT (OUTPATIENT)
Age: 75
End: 2025-02-17
Payer: MEDICARE

## 2025-02-17 VITALS
TEMPERATURE: 97.9 F | SYSTOLIC BLOOD PRESSURE: 123 MMHG | BODY MASS INDEX: 30.9 KG/M2 | HEART RATE: 71 BPM | HEIGHT: 66 IN | RESPIRATION RATE: 16 BRPM | OXYGEN SATURATION: 100 % | DIASTOLIC BLOOD PRESSURE: 70 MMHG | WEIGHT: 192.25 LBS

## 2025-02-17 DIAGNOSIS — E78.00 PURE HYPERCHOLESTEROLEMIA: ICD-10-CM

## 2025-02-17 DIAGNOSIS — R05.3 CHRONIC COUGH: ICD-10-CM

## 2025-02-17 DIAGNOSIS — R73.01 IFG (IMPAIRED FASTING GLUCOSE): ICD-10-CM

## 2025-02-17 DIAGNOSIS — I34.0 NONRHEUMATIC MITRAL VALVE REGURGITATION: ICD-10-CM

## 2025-02-17 DIAGNOSIS — I10 ESSENTIAL HYPERTENSION WITH GOAL BLOOD PRESSURE LESS THAN 130/85: Primary | ICD-10-CM

## 2025-02-17 DIAGNOSIS — K21.9 GASTROESOPHAGEAL REFLUX DISEASE WITHOUT ESOPHAGITIS: ICD-10-CM

## 2025-02-17 DIAGNOSIS — K59.00 CONSTIPATION, UNSPECIFIED CONSTIPATION TYPE: ICD-10-CM

## 2025-02-17 DIAGNOSIS — J44.9 CHRONIC OBSTRUCTIVE PULMONARY DISEASE, UNSPECIFIED COPD TYPE (HCC): ICD-10-CM

## 2025-02-17 DIAGNOSIS — R07.89 LEFT-SIDED CHEST WALL PAIN: ICD-10-CM

## 2025-02-17 DIAGNOSIS — M85.89 OSTEOPENIA OF MULTIPLE SITES: ICD-10-CM

## 2025-02-17 PROCEDURE — 1160F RVW MEDS BY RX/DR IN RCRD: CPT | Performed by: INTERNAL MEDICINE

## 2025-02-17 PROCEDURE — 99214 OFFICE O/P EST MOD 30 MIN: CPT | Performed by: INTERNAL MEDICINE

## 2025-02-17 PROCEDURE — 1090F PRES/ABSN URINE INCON ASSESS: CPT | Performed by: INTERNAL MEDICINE

## 2025-02-17 PROCEDURE — G8399 PT W/DXA RESULTS DOCUMENT: HCPCS | Performed by: INTERNAL MEDICINE

## 2025-02-17 PROCEDURE — 1123F ACP DISCUSS/DSCN MKR DOCD: CPT | Performed by: INTERNAL MEDICINE

## 2025-02-17 PROCEDURE — 3074F SYST BP LT 130 MM HG: CPT | Performed by: INTERNAL MEDICINE

## 2025-02-17 PROCEDURE — 3078F DIAST BP <80 MM HG: CPT | Performed by: INTERNAL MEDICINE

## 2025-02-17 PROCEDURE — G8417 CALC BMI ABV UP PARAM F/U: HCPCS | Performed by: INTERNAL MEDICINE

## 2025-02-17 PROCEDURE — G8427 DOCREV CUR MEDS BY ELIG CLIN: HCPCS | Performed by: INTERNAL MEDICINE

## 2025-02-17 PROCEDURE — 3017F COLORECTAL CA SCREEN DOC REV: CPT | Performed by: INTERNAL MEDICINE

## 2025-02-17 PROCEDURE — 1159F MED LIST DOCD IN RCRD: CPT | Performed by: INTERNAL MEDICINE

## 2025-02-17 PROCEDURE — 3023F SPIROM DOC REV: CPT | Performed by: INTERNAL MEDICINE

## 2025-02-17 PROCEDURE — 1036F TOBACCO NON-USER: CPT | Performed by: INTERNAL MEDICINE

## 2025-02-17 RX ORDER — PANTOPRAZOLE SODIUM 40 MG/1
40 TABLET, DELAYED RELEASE ORAL 2 TIMES DAILY
Qty: 180 TABLET | Refills: 0 | Status: SHIPPED | OUTPATIENT
Start: 2025-02-17

## 2025-02-17 RX ORDER — TIZANIDINE 2 MG/1
2 TABLET ORAL 3 TIMES DAILY PRN
Qty: 30 TABLET | Refills: 0 | Status: SHIPPED | OUTPATIENT
Start: 2025-02-17

## 2025-02-17 ASSESSMENT — PATIENT HEALTH QUESTIONNAIRE - PHQ9
SUM OF ALL RESPONSES TO PHQ QUESTIONS 1-9: 0
SUM OF ALL RESPONSES TO PHQ QUESTIONS 1-9: 0
2. FEELING DOWN, DEPRESSED OR HOPELESS: NOT AT ALL
SUM OF ALL RESPONSES TO PHQ QUESTIONS 1-9: 0
1. LITTLE INTEREST OR PLEASURE IN DOING THINGS: NOT AT ALL
SUM OF ALL RESPONSES TO PHQ9 QUESTIONS 1 & 2: 0
SUM OF ALL RESPONSES TO PHQ QUESTIONS 1-9: 0

## 2025-02-18 ENCOUNTER — TELEPHONE (OUTPATIENT)
Age: 75
End: 2025-02-18

## 2025-02-18 DIAGNOSIS — R73.01 IFG (IMPAIRED FASTING GLUCOSE): ICD-10-CM

## 2025-02-18 DIAGNOSIS — I10 ESSENTIAL HYPERTENSION WITH GOAL BLOOD PRESSURE LESS THAN 130/85: Primary | ICD-10-CM

## 2025-02-18 NOTE — TELEPHONE ENCOUNTER
----- Message from Ma. R sent at 2/18/2025  7:39 AM EST -----  Regarding: ECC Referral Request  ECC Referral Request    Reason for referral request: Lab/Test Order    Specialist/Lab/Test patient is requesting (if known): Blood work    Specialist Phone Number (if applicable): NA    Additional Information: Patient wanted to have her blood drawn in other laboratory since she wanted a different lab. Please reach out to patient.   --------------------------------------------------------------------------------------------------------------------------    Relationship to Patient: Self     Call Back Information: OK to leave message on voicemail  Preferred Call Back Number: Phone [phone] (home)

## 2025-02-24 ENCOUNTER — HOSPITAL ENCOUNTER (OUTPATIENT)
Facility: HOSPITAL | Age: 75
Discharge: HOME | End: 2025-02-27
Payer: MEDICARE

## 2025-02-24 DIAGNOSIS — R07.89 LEFT-SIDED CHEST WALL PAIN: ICD-10-CM

## 2025-02-24 PROCEDURE — 71101 X-RAY EXAM UNILAT RIBS/CHEST: CPT

## 2025-02-28 DIAGNOSIS — R73.01 IFG (IMPAIRED FASTING GLUCOSE): ICD-10-CM

## 2025-02-28 DIAGNOSIS — I10 ESSENTIAL HYPERTENSION WITH GOAL BLOOD PRESSURE LESS THAN 130/85: ICD-10-CM

## 2025-03-01 LAB
ALBUMIN SERPL-MCNC: 4.8 G/DL (ref 3.8–4.8)
ALP SERPL-CCNC: 91 IU/L (ref 44–121)
ALT SERPL-CCNC: 12 IU/L (ref 0–32)
AST SERPL-CCNC: 18 IU/L (ref 0–40)
BILIRUB SERPL-MCNC: 0.3 MG/DL (ref 0–1.2)
BUN SERPL-MCNC: 14 MG/DL (ref 8–27)
BUN/CREAT SERPL: 15 (ref 12–28)
CALCIUM SERPL-MCNC: 9.9 MG/DL (ref 8.7–10.3)
CHLORIDE SERPL-SCNC: 102 MMOL/L (ref 96–106)
CO2 SERPL-SCNC: 20 MMOL/L (ref 20–29)
CREAT SERPL-MCNC: 0.96 MG/DL (ref 0.57–1)
EGFRCR SERPLBLD CKD-EPI 2021: 63 ML/MIN/1.73
GLOBULIN SER CALC-MCNC: 2 G/DL (ref 1.5–4.5)
GLUCOSE SERPL-MCNC: 105 MG/DL (ref 70–99)
HBA1C MFR BLD: 6.2 % (ref 4.8–5.6)
POTASSIUM SERPL-SCNC: 4.6 MMOL/L (ref 3.5–5.2)
PROT SERPL-MCNC: 6.8 G/DL (ref 6–8.5)
SODIUM SERPL-SCNC: 141 MMOL/L (ref 134–144)
TSH SERPL DL<=0.005 MIU/L-ACNC: 3.28 UIU/ML (ref 0.45–4.5)

## 2025-03-11 ENCOUNTER — PATIENT MESSAGE (OUTPATIENT)
Age: 75
End: 2025-03-11

## 2025-03-11 DIAGNOSIS — Z12.11 SCREENING FOR MALIGNANT NEOPLASM OF COLON: Primary | ICD-10-CM

## 2025-04-13 RX ORDER — AMLODIPINE BESYLATE 2.5 MG/1
2.5 TABLET ORAL DAILY
Qty: 90 TABLET | Refills: 0 | OUTPATIENT
Start: 2025-04-13

## 2025-04-13 RX ORDER — ALBUTEROL SULFATE 90 UG/1
AEROSOL, METERED RESPIRATORY (INHALATION)
Qty: 54 G | Refills: 1 | Status: SHIPPED | OUTPATIENT
Start: 2025-04-13

## 2025-04-14 ENCOUNTER — TELEPHONE (OUTPATIENT)
Age: 75
End: 2025-04-14

## 2025-04-14 NOTE — TELEPHONE ENCOUNTER
Amlodipine 2.5 MG tab      Pt has not had in one week.  Pharm was to have requested.      Pt will need this today.      Refill to [facility] #[phone]

## 2025-04-14 NOTE — TELEPHONE ENCOUNTER
Called, Spoke with Pt  Received two pt identifiers  Advised pt per LOV, supposed to be just taking losartan 100mg and HCTZ 12.5(half tab of 25mg)  Pt verbalizes understanding of the instructions and has no further questions at this time.

## 2025-04-17 RX ORDER — AMLODIPINE BESYLATE 2.5 MG/1
2.5 TABLET ORAL DAILY
Qty: 90 TABLET | OUTPATIENT
Start: 2025-04-17

## 2025-04-17 RX ORDER — AMLODIPINE BESYLATE 2.5 MG/1
2.5 TABLET ORAL DAILY
Qty: 30 TABLET | Refills: 0 | Status: SHIPPED | OUTPATIENT
Start: 2025-04-17

## 2025-04-17 NOTE — TELEPHONE ENCOUNTER
Pt called again.    Caller States:  Symptoms/concern:   Blood pressure running high more often  Today is 179/82  Dizziness     What has been tried:   losartan 100mg and HCTZ 12.5(half tab of 25mg)   States has been taking as prescribed.      Appointments:  Date of last appointment:    2/17/2025     Request:  Med adjustment requested  Wants to take amlodipine again, states current treatment not working  Call to advise care plan

## 2025-04-17 NOTE — TELEPHONE ENCOUNTER
Called, Spoke with Pt  Received two pt identifiers  Pt informed per Dr. Mendoza, We can add back Norvasc 2.5 mg daily and schedule her a follow-up with me in 2-3 weeks    Would need to seek urgent care treatment in [place] if dizziness not improving.    Pended medication for Dr. Mendoza, pt aware it will be sent to [facility] pharmacy in [place], [place]. Also assisted patient in scheduling 3 week follow up for 05/06/25 @3 pm. Pt verbalizes understanding of the instructions and has no further questions at this time.

## 2025-04-29 NOTE — PROGRESS NOTES
HISTORY OF PRESENT ILLNESS  Virgen Trejo is a 74 y.o. female.  HPI  Last here 2/17/25. Pt is here for routine care.      She is having more L knee pain x 2-3 days. She has been elevating it, using ice. She has mild swelling in her L knee, will give medrol dose pack      Has a history of hypertension  BP today is 138/71  BP at home: 129/69 130/70   Pt is on losartan 100 mg, hctz 12.5 mg (half a 25mg tablet)  No longer on norvasc 2.5mg daily   Recall pt had a cough on lisinopril. She was taking this medication for 20 years     Wt today is 192 lbs, stable since lov   Discussed diet and wt/l     Reviewed labs   Ordered labs       Previously she states that she has been having what feels like spasming pain in her L chest wall/breast area, this is resolved    Reviewed XR L ribs 2/24/25  IMPRESSION:  1. No apparent left rib fracture.  2. No acute cardiopulmonary disease.      Pt follows with Dr. Knapp (GI) for nausea and hx diarrhea  Pt is taking protonix once daily previously twice daily  Previously took amitiza prn but no longer on this, had hemorrhoid surgery.   Lov CT abd pelv 7/1/22, abnormal      Pt sees Dr. Brewer (cardio) for mitral valve regurgitation  Will f/U annually   Lov 8/6/24  plan for repeat echo in August 2025, same day as follow-up     Recall echo 8/22/22:    Left Ventricle: Normal left ventricular systolic function with a visually estimated EF of 60 - 65%. Left ventricle size is normal. Normal wall thickness. Normal wall motion. Normal diastolic function.    Mitral Valve: Mild regurgitation.        Pt has a hx of chronic recurrent cough x 9/2017  She has seen Dr. Myers (ENT) about this in 2019 and is currently following with GI (see above)  She takes zyrtec in AM, Claritin at night, and flonase which improved her symptoms in the past  Advair was too expensive, she is just using albuterol as needed, cough has been stable improved   We have also tried a variety of different inhalers in the past

## 2025-05-06 ENCOUNTER — OFFICE VISIT (OUTPATIENT)
Age: 75
End: 2025-05-06
Payer: MEDICARE

## 2025-05-06 VITALS
HEIGHT: 66 IN | DIASTOLIC BLOOD PRESSURE: 71 MMHG | HEART RATE: 71 BPM | SYSTOLIC BLOOD PRESSURE: 138 MMHG | OXYGEN SATURATION: 100 % | RESPIRATION RATE: 15 BRPM | BODY MASS INDEX: 30.89 KG/M2 | WEIGHT: 192.2 LBS | TEMPERATURE: 98.1 F

## 2025-05-06 DIAGNOSIS — I34.0 NONRHEUMATIC MITRAL VALVE REGURGITATION: ICD-10-CM

## 2025-05-06 DIAGNOSIS — M85.89 OSTEOPENIA OF MULTIPLE SITES: ICD-10-CM

## 2025-05-06 DIAGNOSIS — I10 ESSENTIAL HYPERTENSION WITH GOAL BLOOD PRESSURE LESS THAN 130/85: Primary | ICD-10-CM

## 2025-05-06 DIAGNOSIS — M25.562 ACUTE PAIN OF LEFT KNEE: ICD-10-CM

## 2025-05-06 DIAGNOSIS — J44.9 CHRONIC OBSTRUCTIVE PULMONARY DISEASE, UNSPECIFIED COPD TYPE (HCC): ICD-10-CM

## 2025-05-06 DIAGNOSIS — E78.00 PURE HYPERCHOLESTEROLEMIA: ICD-10-CM

## 2025-05-06 DIAGNOSIS — Z12.11 COLON CANCER SCREENING: ICD-10-CM

## 2025-05-06 DIAGNOSIS — R73.01 IFG (IMPAIRED FASTING GLUCOSE): ICD-10-CM

## 2025-05-06 DIAGNOSIS — K20.90 ESOPHAGITIS: ICD-10-CM

## 2025-05-06 PROCEDURE — 1125F AMNT PAIN NOTED PAIN PRSNT: CPT | Performed by: INTERNAL MEDICINE

## 2025-05-06 PROCEDURE — 1123F ACP DISCUSS/DSCN MKR DOCD: CPT | Performed by: INTERNAL MEDICINE

## 2025-05-06 PROCEDURE — 1090F PRES/ABSN URINE INCON ASSESS: CPT | Performed by: INTERNAL MEDICINE

## 2025-05-06 PROCEDURE — 3017F COLORECTAL CA SCREEN DOC REV: CPT | Performed by: INTERNAL MEDICINE

## 2025-05-06 PROCEDURE — 3023F SPIROM DOC REV: CPT | Performed by: INTERNAL MEDICINE

## 2025-05-06 PROCEDURE — 1036F TOBACCO NON-USER: CPT | Performed by: INTERNAL MEDICINE

## 2025-05-06 PROCEDURE — G8399 PT W/DXA RESULTS DOCUMENT: HCPCS | Performed by: INTERNAL MEDICINE

## 2025-05-06 PROCEDURE — 1159F MED LIST DOCD IN RCRD: CPT | Performed by: INTERNAL MEDICINE

## 2025-05-06 PROCEDURE — 3078F DIAST BP <80 MM HG: CPT | Performed by: INTERNAL MEDICINE

## 2025-05-06 PROCEDURE — 1160F RVW MEDS BY RX/DR IN RCRD: CPT | Performed by: INTERNAL MEDICINE

## 2025-05-06 PROCEDURE — G8427 DOCREV CUR MEDS BY ELIG CLIN: HCPCS | Performed by: INTERNAL MEDICINE

## 2025-05-06 PROCEDURE — 99214 OFFICE O/P EST MOD 30 MIN: CPT | Performed by: INTERNAL MEDICINE

## 2025-05-06 PROCEDURE — G8417 CALC BMI ABV UP PARAM F/U: HCPCS | Performed by: INTERNAL MEDICINE

## 2025-05-06 PROCEDURE — 3075F SYST BP GE 130 - 139MM HG: CPT | Performed by: INTERNAL MEDICINE

## 2025-05-06 RX ORDER — HYDROCHLOROTHIAZIDE 25 MG/1
25 TABLET ORAL DAILY
Qty: 90 TABLET | Refills: 1 | Status: SHIPPED | OUTPATIENT
Start: 2025-05-06

## 2025-05-06 RX ORDER — LOSARTAN POTASSIUM 100 MG/1
100 TABLET ORAL DAILY
Qty: 90 TABLET | Refills: 1 | Status: SHIPPED | OUTPATIENT
Start: 2025-05-06

## 2025-05-06 RX ORDER — METHYLPREDNISOLONE 4 MG/1
TABLET ORAL
Qty: 1 KIT | Refills: 0 | Status: SHIPPED | OUTPATIENT
Start: 2025-05-06 | End: 2025-05-12

## 2025-05-06 ASSESSMENT — PATIENT HEALTH QUESTIONNAIRE - PHQ9
2. FEELING DOWN, DEPRESSED OR HOPELESS: NOT AT ALL
1. LITTLE INTEREST OR PLEASURE IN DOING THINGS: NOT AT ALL
SUM OF ALL RESPONSES TO PHQ QUESTIONS 1-9: 0

## 2025-05-06 NOTE — PROGRESS NOTES
Have you been to the ER, urgent care clinic since your last visit?  Hospitalized since your last visit?   NO    Have you seen or consulted any other health care providers outside our system since your last visit?   NO      “Have you had a colorectal cancer screening such as a colonoscopy/FIT/Cologuard?    NO    Date of last Colonoscopy: 2/28/2018  No cologuard on file  No FIT/FOBT on file   No flexible sigmoidoscopy on file

## 2025-05-12 RX ORDER — AMLODIPINE BESYLATE 2.5 MG/1
2.5 TABLET ORAL DAILY
Qty: 90 TABLET | Refills: 0 | Status: SHIPPED | OUTPATIENT
Start: 2025-05-12

## 2025-05-15 NOTE — TELEPHONE ENCOUNTER
----- Message from Urbano Leblanc NP sent at 8/24/2022  9:07 AM EDT -----  Just FLOWER---already discussed during her OV with Adryan Yepez. Normal heart pumping strength,  mild leakage or stiffness across mitral valve but stable.
MEDICATIONS  (STANDING):  atorvastatin 40 milliGRAM(s) Oral at bedtime  metoprolol succinate ER 25 milliGRAM(s) Oral daily  polyethylene glycol 3350 17 Gram(s) Oral daily    MEDICATIONS  (PRN):  acetaminophen     Tablet .. 650 milliGRAM(s) Oral every 6 hours PRN Temp greater or equal to 38C (100.4F), Mild Pain (1 - 3)  benzocaine/menthol Lozenge 1 Lozenge Oral every 6 hours PRN cough  guaiFENesin Oral Liquid (Sugar-Free) 200 milliGRAM(s) Oral every 6 hours PRN Cough

## 2025-05-22 RX ORDER — PANTOPRAZOLE SODIUM 40 MG/1
40 TABLET, DELAYED RELEASE ORAL 2 TIMES DAILY
Qty: 180 TABLET | Refills: 0 | Status: SHIPPED | OUTPATIENT
Start: 2025-05-22

## 2025-05-27 DIAGNOSIS — M85.89 OSTEOPENIA OF MULTIPLE SITES: ICD-10-CM

## 2025-05-27 DIAGNOSIS — I10 ESSENTIAL HYPERTENSION WITH GOAL BLOOD PRESSURE LESS THAN 130/85: ICD-10-CM

## 2025-05-27 DIAGNOSIS — E78.00 PURE HYPERCHOLESTEROLEMIA: ICD-10-CM

## 2025-05-27 DIAGNOSIS — R73.01 IFG (IMPAIRED FASTING GLUCOSE): ICD-10-CM

## 2025-05-28 ENCOUNTER — RESULTS FOLLOW-UP (OUTPATIENT)
Age: 75
End: 2025-05-28

## 2025-05-28 LAB
ANION GAP SERPL CALC-SCNC: 10 MMOL/L (ref 2–12)
BUN SERPL-MCNC: 18 MG/DL (ref 6–20)
BUN/CREAT SERPL: 19 (ref 12–20)
CALCIUM SERPL-MCNC: 9.8 MG/DL (ref 8.5–10.1)
CHLORIDE SERPL-SCNC: 105 MMOL/L (ref 97–108)
CHOLEST SERPL-MCNC: 140 MG/DL
CO2 SERPL-SCNC: 21 MMOL/L (ref 21–32)
CREAT SERPL-MCNC: 0.97 MG/DL (ref 0.55–1.02)
EST. AVERAGE GLUCOSE BLD GHB EST-MCNC: 128 MG/DL
GLUCOSE SERPL-MCNC: 110 MG/DL (ref 65–100)
HBA1C MFR BLD: 6.1 % (ref 4–5.6)
HDLC SERPL-MCNC: 63 MG/DL
HDLC SERPL: 2.2 (ref 0–5)
LDLC SERPL CALC-MCNC: 60.8 MG/DL (ref 0–100)
POTASSIUM SERPL-SCNC: 5 MMOL/L (ref 3.5–5.1)
SODIUM SERPL-SCNC: 136 MMOL/L (ref 136–145)
TRIGL SERPL-MCNC: 81 MG/DL
VLDLC SERPL CALC-MCNC: 16.2 MG/DL

## 2025-06-04 ENCOUNTER — TELEPHONE (OUTPATIENT)
Age: 75
End: 2025-06-04

## 2025-06-04 DIAGNOSIS — M25.562 ACUTE PAIN OF LEFT KNEE: Primary | ICD-10-CM

## 2025-06-04 NOTE — TELEPHONE ENCOUNTER
Pt states would like a call from clinical staff, states pcp prescribed methylPREDNISolone (MEDROL DOSEPACK) 4 MG tablet, pt states no relief, knees are still swollen, and having trouble walking.     Pt also states would like to discuss recent lab results, states hasn't heard anything regarding results.     Please call to discuss.

## 2025-06-04 NOTE — TELEPHONE ENCOUNTER
Called, Spoke with Pt  Received two pt identifiers  Advised pt since still going on will have her see ortho per LOV--referral placed and info sent via [facility] per pts request  Pt informed per Dr. Reji fuller, work on diet and weight loss  Pt verbalizes understanding of the instructions and has no further questions at this time.

## 2025-06-20 RX ORDER — KETOCONAZOLE 20 MG/G
CREAM TOPICAL
Qty: 30 G | Refills: 1 | Status: SHIPPED | OUTPATIENT
Start: 2025-06-20

## 2025-06-20 NOTE — TELEPHONE ENCOUNTER
Future Appointments:  Future Appointments   Date Time Provider Department Center   7/11/2025  2:00 PM [facility] 1 [facility] [place] Dx   7/11/2025  2:40 PM L[facility] 1 [facility] RLMAMMO [place] Dx   8/11/2025 10:00 AM [facility] ECHO 2 [facility]  AMB   8/11/2025 10:40 AM Randal Brewer MD [facility]  AMB   8/18/2025 12:00 PM Kaitlyn Mendoza MD [facility]3 [facility] ECC DEP   11/10/2025  1:30 PM Kaitlyn Mendoza MD [facility]3 [facility] DEP        Last Appointment With Me:  5/6/2025     Requested Prescriptions     Pending Prescriptions Disp Refills    ketoconazole (NIZORAL) 2 % cream  1     Sig: Apply small amount to affected area daily as needed

## 2025-06-20 NOTE — TELEPHONE ENCOUNTER
Pt needs refill on cream      Ketoconazole  2% cream   Will need exact directions.      Refill to [facility] #[phone]

## 2025-07-02 ENCOUNTER — TRANSCRIBE ORDERS (OUTPATIENT)
Facility: HOSPITAL | Age: 75
End: 2025-07-02

## 2025-07-02 DIAGNOSIS — M25.462 EFFUSION OF LEFT KNEE: Primary | ICD-10-CM

## 2025-07-11 ENCOUNTER — HOSPITAL ENCOUNTER (OUTPATIENT)
Facility: HOSPITAL | Age: 75
Discharge: HOME | End: 2025-07-14
Attending: INTERNAL MEDICINE
Payer: MEDICARE

## 2025-07-11 DIAGNOSIS — Z12.31 VISIT FOR SCREENING MAMMOGRAM: ICD-10-CM

## 2025-07-11 DIAGNOSIS — M85.89 OSTEOPENIA OF MULTIPLE SITES: ICD-10-CM

## 2025-07-11 PROCEDURE — 77063 BREAST TOMOSYNTHESIS BI: CPT

## 2025-07-11 PROCEDURE — 77080 DXA BONE DENSITY AXIAL: CPT

## 2025-07-12 ENCOUNTER — HOSPITAL ENCOUNTER (OUTPATIENT)
Facility: HOSPITAL | Age: 75
Discharge: HOME | End: 2025-07-15
Attending: ORTHOPAEDIC SURGERY
Payer: MEDICARE

## 2025-07-12 DIAGNOSIS — M25.462 EFFUSION OF LEFT KNEE: ICD-10-CM

## 2025-07-12 PROCEDURE — 73721 MRI JNT OF LWR EXTRE W/O DYE: CPT

## 2025-08-11 SDOH — HEALTH STABILITY: PHYSICAL HEALTH: ON AVERAGE, HOW MANY DAYS PER WEEK DO YOU ENGAGE IN MODERATE TO STRENUOUS EXERCISE (LIKE A BRISK WALK)?: 0 DAYS

## 2025-08-11 SDOH — HEALTH STABILITY: PHYSICAL HEALTH: ON AVERAGE, HOW MANY MINUTES DO YOU ENGAGE IN EXERCISE AT THIS LEVEL?: 0 MIN

## 2025-08-11 ASSESSMENT — PATIENT HEALTH QUESTIONNAIRE - PHQ9
SUM OF ALL RESPONSES TO PHQ QUESTIONS 1-9: 0
2. FEELING DOWN, DEPRESSED OR HOPELESS: NOT AT ALL
SUM OF ALL RESPONSES TO PHQ QUESTIONS 1-9: 0
1. LITTLE INTEREST OR PLEASURE IN DOING THINGS: NOT AT ALL

## 2025-08-12 ENCOUNTER — OFFICE VISIT (OUTPATIENT)
Age: 75
End: 2025-08-12
Payer: MEDICARE

## 2025-08-12 ENCOUNTER — ANCILLARY PROCEDURE (OUTPATIENT)
Age: 75
End: 2025-08-12
Payer: MEDICARE

## 2025-08-12 VITALS
SYSTOLIC BLOOD PRESSURE: 136 MMHG | WEIGHT: 194 LBS | BODY MASS INDEX: 31.18 KG/M2 | DIASTOLIC BLOOD PRESSURE: 74 MMHG | OXYGEN SATURATION: 100 % | HEART RATE: 62 BPM | HEIGHT: 66 IN

## 2025-08-12 VITALS
WEIGHT: 194 LBS | HEIGHT: 66 IN | HEART RATE: 71 BPM | BODY MASS INDEX: 31.18 KG/M2 | DIASTOLIC BLOOD PRESSURE: 74 MMHG | SYSTOLIC BLOOD PRESSURE: 140 MMHG

## 2025-08-12 DIAGNOSIS — Z01.810 PREOPERATIVE CARDIOVASCULAR EXAMINATION: ICD-10-CM

## 2025-08-12 DIAGNOSIS — M17.12 OSTEOARTHRITIS OF LEFT KNEE, UNSPECIFIED OSTEOARTHRITIS TYPE: ICD-10-CM

## 2025-08-12 DIAGNOSIS — I10 ESSENTIAL (PRIMARY) HYPERTENSION: ICD-10-CM

## 2025-08-12 DIAGNOSIS — E78.2 MIXED HYPERLIPIDEMIA: ICD-10-CM

## 2025-08-12 DIAGNOSIS — K21.00 GASTROESOPHAGEAL REFLUX DISEASE WITH ESOPHAGITIS WITHOUT HEMORRHAGE: ICD-10-CM

## 2025-08-12 DIAGNOSIS — I34.0 NONRHEUMATIC MITRAL VALVE REGURGITATION: ICD-10-CM

## 2025-08-12 DIAGNOSIS — K62.5 BRBPR (BRIGHT RED BLOOD PER RECTUM): ICD-10-CM

## 2025-08-12 DIAGNOSIS — I34.0 NONRHEUMATIC MITRAL VALVE REGURGITATION: Primary | ICD-10-CM

## 2025-08-12 PROCEDURE — 1036F TOBACCO NON-USER: CPT | Performed by: INTERNAL MEDICINE

## 2025-08-12 PROCEDURE — G8417 CALC BMI ABV UP PARAM F/U: HCPCS | Performed by: INTERNAL MEDICINE

## 2025-08-12 PROCEDURE — G8399 PT W/DXA RESULTS DOCUMENT: HCPCS | Performed by: INTERNAL MEDICINE

## 2025-08-12 PROCEDURE — 3078F DIAST BP <80 MM HG: CPT | Performed by: INTERNAL MEDICINE

## 2025-08-12 PROCEDURE — 1123F ACP DISCUSS/DSCN MKR DOCD: CPT | Performed by: INTERNAL MEDICINE

## 2025-08-12 PROCEDURE — 93005 ELECTROCARDIOGRAM TRACING: CPT | Performed by: INTERNAL MEDICINE

## 2025-08-12 PROCEDURE — 3075F SYST BP GE 130 - 139MM HG: CPT | Performed by: INTERNAL MEDICINE

## 2025-08-12 PROCEDURE — 93306 TTE W/DOPPLER COMPLETE: CPT | Performed by: INTERNAL MEDICINE

## 2025-08-12 PROCEDURE — 1126F AMNT PAIN NOTED NONE PRSNT: CPT | Performed by: INTERNAL MEDICINE

## 2025-08-12 PROCEDURE — 93010 ELECTROCARDIOGRAM REPORT: CPT | Performed by: INTERNAL MEDICINE

## 2025-08-12 PROCEDURE — 3017F COLORECTAL CA SCREEN DOC REV: CPT | Performed by: INTERNAL MEDICINE

## 2025-08-12 PROCEDURE — 99214 OFFICE O/P EST MOD 30 MIN: CPT | Performed by: INTERNAL MEDICINE

## 2025-08-12 PROCEDURE — 1090F PRES/ABSN URINE INCON ASSESS: CPT | Performed by: INTERNAL MEDICINE

## 2025-08-12 PROCEDURE — G2211 COMPLEX E/M VISIT ADD ON: HCPCS | Performed by: INTERNAL MEDICINE

## 2025-08-12 PROCEDURE — G8427 DOCREV CUR MEDS BY ELIG CLIN: HCPCS | Performed by: INTERNAL MEDICINE

## 2025-08-12 PROCEDURE — 1159F MED LIST DOCD IN RCRD: CPT | Performed by: INTERNAL MEDICINE

## 2025-08-12 ASSESSMENT — PATIENT HEALTH QUESTIONNAIRE - PHQ9
1. LITTLE INTEREST OR PLEASURE IN DOING THINGS: NOT AT ALL
2. FEELING DOWN, DEPRESSED OR HOPELESS: NOT AT ALL
SUM OF ALL RESPONSES TO PHQ QUESTIONS 1-9: 0

## 2025-08-13 LAB
ECHO AO ASC DIAM: 3 CM
ECHO AO ASCENDING AORTA INDEX: 1.52 CM/M2
ECHO AO ROOT DIAM: 3.2 CM
ECHO AO ROOT INDEX: 1.62 CM/M2
ECHO AV AREA PEAK VELOCITY: 2.8 CM2
ECHO AV AREA VTI: 2.9 CM2
ECHO AV AREA/BSA PEAK VELOCITY: 1.4 CM2/M2
ECHO AV AREA/BSA VTI: 1.5 CM2/M2
ECHO AV MEAN GRADIENT: 6 MMHG
ECHO AV MEAN VELOCITY: 1.1 M/S
ECHO AV PEAK GRADIENT: 10 MMHG
ECHO AV PEAK VELOCITY: 1.6 M/S
ECHO AV VELOCITY RATIO: 0.94
ECHO AV VTI: 38.2 CM
ECHO BSA: 2.01 M2
ECHO EST RA PRESSURE: 3 MMHG
ECHO LA DIAMETER INDEX: 1.93 CM/M2
ECHO LA DIAMETER: 3.8 CM
ECHO LA TO AORTIC ROOT RATIO: 1.19
ECHO LA VOL A-L A2C: 44 ML (ref 22–52)
ECHO LA VOL A-L A4C: 54 ML (ref 22–52)
ECHO LA VOL BP: 48 ML (ref 22–52)
ECHO LA VOL MOD A2C: 42 ML (ref 22–52)
ECHO LA VOL MOD A4C: 50 ML (ref 22–52)
ECHO LA VOL/BSA BIPLANE: 24 ML/M2 (ref 16–34)
ECHO LA VOLUME AREA LENGTH: 51 ML
ECHO LA VOLUME INDEX A-L A2C: 22 ML/M2 (ref 16–34)
ECHO LA VOLUME INDEX A-L A4C: 27 ML/M2 (ref 16–34)
ECHO LA VOLUME INDEX AREA LENGTH: 26 ML/M2 (ref 16–34)
ECHO LA VOLUME INDEX MOD A2C: 21 ML/M2 (ref 16–34)
ECHO LA VOLUME INDEX MOD A4C: 25 ML/M2 (ref 16–34)
ECHO LV E' LATERAL VELOCITY: 11.16 CM/S
ECHO LV E' SEPTAL VELOCITY: 7.58 CM/S
ECHO LV EDV A2C: 81 ML
ECHO LV EDV A4C: 77 ML
ECHO LV EDV BP: 80 ML (ref 56–104)
ECHO LV EDV INDEX A4C: 39 ML/M2
ECHO LV EDV INDEX BP: 41 ML/M2
ECHO LV EDV NDEX A2C: 41 ML/M2
ECHO LV EF PHYSICIAN: 70 %
ECHO LV EJECTION FRACTION A2C: 75 %
ECHO LV EJECTION FRACTION A4C: 69 %
ECHO LV EJECTION FRACTION BIPLANE: 71 % (ref 55–100)
ECHO LV ESV A2C: 20 ML
ECHO LV ESV A4C: 24 ML
ECHO LV ESV BP: 23 ML (ref 19–49)
ECHO LV ESV INDEX A2C: 10 ML/M2
ECHO LV ESV INDEX A4C: 12 ML/M2
ECHO LV ESV INDEX BP: 12 ML/M2
ECHO LV FRACTIONAL SHORTENING: 30 % (ref 28–44)
ECHO LV INTERNAL DIMENSION DIASTOLE INDEX: 2.23 CM/M2
ECHO LV INTERNAL DIMENSION DIASTOLIC: 4.4 CM (ref 3.9–5.3)
ECHO LV INTERNAL DIMENSION SYSTOLIC INDEX: 1.57 CM/M2
ECHO LV INTERNAL DIMENSION SYSTOLIC: 3.1 CM
ECHO LV IVSD: 1.1 CM (ref 0.6–0.9)
ECHO LV MASS 2D: 168.9 G (ref 67–162)
ECHO LV MASS INDEX 2D: 85.7 G/M2 (ref 43–95)
ECHO LV POSTERIOR WALL DIASTOLIC: 1.1 CM (ref 0.6–0.9)
ECHO LV RELATIVE WALL THICKNESS RATIO: 0.5
ECHO LVOT AREA: 3.1 CM2
ECHO LVOT AV VTI INDEX: 0.96
ECHO LVOT DIAM: 2 CM
ECHO LVOT MEAN GRADIENT: 5 MMHG
ECHO LVOT PEAK GRADIENT: 8 MMHG
ECHO LVOT PEAK VELOCITY: 1.5 M/S
ECHO LVOT STROKE VOLUME INDEX: 58.3 ML/M2
ECHO LVOT SV: 114.9 ML
ECHO LVOT VTI: 36.6 CM
ECHO MV A VELOCITY: 0.97 M/S
ECHO MV E DECELERATION TIME (DT): 282 MS
ECHO MV E VELOCITY: 0.88 M/S
ECHO MV E/A RATIO: 0.91
ECHO MV E/E' LATERAL: 7.89
ECHO MV E/E' RATIO (AVERAGED): 9.75
ECHO MV E/E' SEPTAL: 11.61
ECHO MV REGURGITANT PEAK GRADIENT: 104 MMHG
ECHO MV REGURGITANT PEAK VELOCITY: 5.1 M/S
ECHO PV MAX VELOCITY: 1 M/S
ECHO PV PEAK GRADIENT: 4 MMHG
ECHO RIGHT VENTRICULAR SYSTOLIC PRESSURE (RVSP): 28 MMHG
ECHO RV BASAL DIMENSION: 3.5 CM
ECHO RV TAPSE: 2.2 CM (ref 1.7–?)
ECHO RVOT PEAK GRADIENT: 2 MMHG
ECHO RVOT PEAK VELOCITY: 0.6 M/S
ECHO TV REGURGITANT MAX VELOCITY: 2.5 M/S
ECHO TV REGURGITANT PEAK GRADIENT: 25 MMHG

## 2025-08-13 PROCEDURE — 93306 TTE W/DOPPLER COMPLETE: CPT | Performed by: INTERNAL MEDICINE

## 2025-08-18 SDOH — HEALTH STABILITY: PHYSICAL HEALTH: ON AVERAGE, HOW MANY MINUTES DO YOU ENGAGE IN EXERCISE AT THIS LEVEL?: 0 MIN

## 2025-08-18 SDOH — HEALTH STABILITY: PHYSICAL HEALTH: ON AVERAGE, HOW MANY DAYS PER WEEK DO YOU ENGAGE IN MODERATE TO STRENUOUS EXERCISE (LIKE A BRISK WALK)?: 0 DAYS

## 2025-08-18 ASSESSMENT — PATIENT HEALTH QUESTIONNAIRE - PHQ9
SUM OF ALL RESPONSES TO PHQ QUESTIONS 1-9: 0
2. FEELING DOWN, DEPRESSED OR HOPELESS: NOT AT ALL
1. LITTLE INTEREST OR PLEASURE IN DOING THINGS: NOT AT ALL
SUM OF ALL RESPONSES TO PHQ QUESTIONS 1-9: 0

## 2025-08-18 ASSESSMENT — LIFESTYLE VARIABLES
HOW OFTEN DO YOU HAVE A DRINK CONTAINING ALCOHOL: NEVER
HOW OFTEN DO YOU HAVE A DRINK CONTAINING ALCOHOL: 1
HOW OFTEN DO YOU HAVE SIX OR MORE DRINKS ON ONE OCCASION: 1
HOW MANY STANDARD DRINKS CONTAINING ALCOHOL DO YOU HAVE ON A TYPICAL DAY: 0
HOW MANY STANDARD DRINKS CONTAINING ALCOHOL DO YOU HAVE ON A TYPICAL DAY: PATIENT DOES NOT DRINK

## 2025-08-18 ASSESSMENT — ENCOUNTER SYMPTOMS: SHORTNESS OF BREATH: 0

## 2025-08-19 ENCOUNTER — OFFICE VISIT (OUTPATIENT)
Age: 75
End: 2025-08-19
Payer: MEDICARE

## 2025-08-19 VITALS
HEIGHT: 66 IN | SYSTOLIC BLOOD PRESSURE: 128 MMHG | RESPIRATION RATE: 12 BRPM | WEIGHT: 196.8 LBS | DIASTOLIC BLOOD PRESSURE: 64 MMHG | HEART RATE: 66 BPM | OXYGEN SATURATION: 97 % | BODY MASS INDEX: 31.63 KG/M2

## 2025-08-19 DIAGNOSIS — I10 ESSENTIAL HYPERTENSION WITH GOAL BLOOD PRESSURE LESS THAN 130/85: Primary | ICD-10-CM

## 2025-08-19 DIAGNOSIS — M85.89 OSTEOPENIA OF MULTIPLE SITES: ICD-10-CM

## 2025-08-19 DIAGNOSIS — Z00.00 MEDICARE ANNUAL WELLNESS VISIT, SUBSEQUENT: ICD-10-CM

## 2025-08-19 DIAGNOSIS — K21.9 GASTROESOPHAGEAL REFLUX DISEASE WITHOUT ESOPHAGITIS: ICD-10-CM

## 2025-08-19 DIAGNOSIS — E78.00 PURE HYPERCHOLESTEROLEMIA: ICD-10-CM

## 2025-08-19 DIAGNOSIS — J44.9 CHRONIC OBSTRUCTIVE PULMONARY DISEASE, UNSPECIFIED COPD TYPE (HCC): ICD-10-CM

## 2025-08-19 DIAGNOSIS — R05.3 CHRONIC COUGH: ICD-10-CM

## 2025-08-19 DIAGNOSIS — R73.01 IFG (IMPAIRED FASTING GLUCOSE): ICD-10-CM

## 2025-08-19 PROCEDURE — 1159F MED LIST DOCD IN RCRD: CPT | Performed by: INTERNAL MEDICINE

## 2025-08-19 PROCEDURE — G8417 CALC BMI ABV UP PARAM F/U: HCPCS | Performed by: INTERNAL MEDICINE

## 2025-08-19 PROCEDURE — 3017F COLORECTAL CA SCREEN DOC REV: CPT | Performed by: INTERNAL MEDICINE

## 2025-08-19 PROCEDURE — 3074F SYST BP LT 130 MM HG: CPT | Performed by: INTERNAL MEDICINE

## 2025-08-19 PROCEDURE — 1123F ACP DISCUSS/DSCN MKR DOCD: CPT | Performed by: INTERNAL MEDICINE

## 2025-08-19 PROCEDURE — 1160F RVW MEDS BY RX/DR IN RCRD: CPT | Performed by: INTERNAL MEDICINE

## 2025-08-19 PROCEDURE — 99214 OFFICE O/P EST MOD 30 MIN: CPT | Performed by: INTERNAL MEDICINE

## 2025-08-19 PROCEDURE — 1090F PRES/ABSN URINE INCON ASSESS: CPT | Performed by: INTERNAL MEDICINE

## 2025-08-19 PROCEDURE — 3078F DIAST BP <80 MM HG: CPT | Performed by: INTERNAL MEDICINE

## 2025-08-19 PROCEDURE — G8427 DOCREV CUR MEDS BY ELIG CLIN: HCPCS | Performed by: INTERNAL MEDICINE

## 2025-08-19 PROCEDURE — G0439 PPPS, SUBSEQ VISIT: HCPCS | Performed by: INTERNAL MEDICINE

## 2025-08-19 PROCEDURE — G8399 PT W/DXA RESULTS DOCUMENT: HCPCS | Performed by: INTERNAL MEDICINE

## 2025-08-19 PROCEDURE — 1036F TOBACCO NON-USER: CPT | Performed by: INTERNAL MEDICINE

## 2025-08-19 PROCEDURE — 3023F SPIROM DOC REV: CPT | Performed by: INTERNAL MEDICINE

## 2025-08-19 ASSESSMENT — ENCOUNTER SYMPTOMS: SHORTNESS OF BREATH: 0

## 2025-08-20 LAB
ALBUMIN SERPL-MCNC: 4.6 G/DL (ref 3.5–5.2)
ALBUMIN/GLOB SERPL: 1.8 (ref 1.1–2.2)
ALP SERPL-CCNC: 72 U/L (ref 35–104)
ALT SERPL-CCNC: 15 U/L (ref 10–35)
ANION GAP SERPL CALC-SCNC: 14 MMOL/L (ref 2–14)
AST SERPL-CCNC: 19 U/L (ref 10–35)
BILIRUB SERPL-MCNC: 0.4 MG/DL (ref 0–1.2)
BUN SERPL-MCNC: 14 MG/DL (ref 8–23)
BUN/CREAT SERPL: 15 (ref 12–20)
CALCIUM SERPL-MCNC: 10.2 MG/DL (ref 8.8–10.2)
CHLORIDE SERPL-SCNC: 101 MMOL/L (ref 98–107)
CO2 SERPL-SCNC: 25 MMOL/L (ref 20–29)
CREAT SERPL-MCNC: 0.96 MG/DL (ref 0.6–1)
ERYTHROCYTE [DISTWIDTH] IN BLOOD BY AUTOMATED COUNT: 12.7 % (ref 11.5–14.5)
EST. AVERAGE GLUCOSE BLD GHB EST-MCNC: 130 MG/DL
GLOBULIN SER CALC-MCNC: 2.6 G/DL (ref 2–4)
GLUCOSE SERPL-MCNC: 90 MG/DL (ref 65–100)
HBA1C MFR BLD: 6.1 % (ref 4–5.6)
HCT VFR BLD AUTO: 35.8 % (ref 35–47)
HGB BLD-MCNC: 11.8 G/DL (ref 11.5–16)
MCH RBC QN AUTO: 30.6 PG (ref 26–34)
MCHC RBC AUTO-ENTMCNC: 33 G/DL (ref 30–36.5)
MCV RBC AUTO: 93 FL (ref 80–99)
NRBC # BLD: 0 K/UL (ref 0–0.01)
NRBC BLD-RTO: 0 PER 100 WBC
PLATELET # BLD AUTO: 311 K/UL (ref 150–400)
PMV BLD AUTO: 11.4 FL (ref 8.9–12.9)
POTASSIUM SERPL-SCNC: 4.4 MMOL/L (ref 3.5–5.1)
PROT SERPL-MCNC: 7.1 G/DL (ref 6.4–8.3)
RBC # BLD AUTO: 3.85 M/UL (ref 3.8–5.2)
SODIUM SERPL-SCNC: 140 MMOL/L (ref 136–145)
WBC # BLD AUTO: 7.3 K/UL (ref 3.6–11)

## 2025-08-22 ENCOUNTER — RESULTS FOLLOW-UP (OUTPATIENT)
Age: 75
End: 2025-08-22

## 2025-08-29 RX ORDER — PANTOPRAZOLE SODIUM 40 MG/1
40 TABLET, DELAYED RELEASE ORAL 2 TIMES DAILY
Qty: 180 TABLET | Refills: 0 | Status: SHIPPED | OUTPATIENT
Start: 2025-08-29

## (undated) DEVICE — 1200 GUARD II KIT W/5MM TUBE W/O VAC TUBE: Brand: GUARDIAN

## (undated) DEVICE — FORCEPS BX L240CM JAW DIA2.8MM L CAP W/ NDL MIC MESH TOOTH

## (undated) DEVICE — CATH IV AUTOGRD BC BLU 22GA 25 -- INSYTE

## (undated) DEVICE — Device

## (undated) DEVICE — KENDALL RADIOLUCENT FOAM MONITORING ELECTRODE RECTANGULAR SHAPE: Brand: KENDALL

## (undated) DEVICE — TOWEL 4 PLY TISS 19X30 SUE WHT

## (undated) DEVICE — BAG SPEC BIOHZRD 10 X 10 IN --

## (undated) DEVICE — SYR 3ML LL TIP 1/10ML GRAD --

## (undated) DEVICE — SET ADMIN 16ML TBNG L100IN 2 Y INJ SITE IV PIGGY BK DISP

## (undated) DEVICE — BLOCK BITE ENDOSCP AD 21 MM W/ DIL BLU LF DISP

## (undated) DEVICE — BASIN EMSIS 16OZ GRAPHITE PLAS KID SHP MOLD GRAD FOR ORAL

## (undated) DEVICE — SOLIDIFIER FLD 2OZ 1500CC N DISINF IN BTL DISP SAFESORB

## (undated) DEVICE — SET ADMIN 16ML TBNG L100IN 2 Y INJ SITE IV PIGGY BK DISP (ORDER IN MULIPLES OF 48)

## (undated) DEVICE — MEDI-VAC YANK SUCT HNDL W/TPRD BULBOUS TIP: Brand: CARDINAL HEALTH

## (undated) DEVICE — NEONATAL-ADULT SPO2 SENSOR: Brand: NELLCOR

## (undated) DEVICE — CONTAINER SPEC 20 ML LID NEUT BUFF FORMALIN 10 % POLYPR STS

## (undated) DEVICE — SYR 10ML LUER LOK 1/5ML GRAD --

## (undated) DEVICE — SOLIDIFIER MEDC 1200ML -- CONVERT TO 356117

## (undated) DEVICE — NEEDLE HYPO 18GA L1.5IN PNK S STL HUB POLYPR SHLD REG BVL

## (undated) DEVICE — ELECTRODE,RADIOTRANSLUCENT,FOAM,5PK: Brand: MEDLINE

## (undated) DEVICE — HYPODERMIC SAFETY NEEDLE: Brand: MAGELLAN

## (undated) DEVICE — YANKAUER,TAPERED BULBOUS TIP,W/O VENT: Brand: MEDLINE

## (undated) DEVICE — CATH IV AUTOGRD BC PNK 20GA 25 -- INSYTE

## (undated) DEVICE — Z DISCONTINUED PER MEDLINE LINE GAS SAMPLING O2/CO2 LNG AD 13 FT NSL W/ TBNG FILTERLINE

## (undated) DEVICE — FORCEPS BX L160CM DIA8MM GRSP DISECT CUP TIP NONLOCKING ROT